# Patient Record
Sex: MALE | Race: BLACK OR AFRICAN AMERICAN | URBAN - METROPOLITAN AREA
[De-identification: names, ages, dates, MRNs, and addresses within clinical notes are randomized per-mention and may not be internally consistent; named-entity substitution may affect disease eponyms.]

---

## 2022-05-12 ENCOUNTER — INPATIENT (INPATIENT)
Facility: HOSPITAL | Age: 42
LOS: 6 days | Discharge: HOME | End: 2022-05-19
Attending: PSYCHIATRY & NEUROLOGY | Admitting: PSYCHIATRY & NEUROLOGY
Payer: MEDICAID

## 2022-05-12 VITALS
SYSTOLIC BLOOD PRESSURE: 136 MMHG | OXYGEN SATURATION: 98 % | WEIGHT: 169.98 LBS | RESPIRATION RATE: 20 BRPM | TEMPERATURE: 98 F | DIASTOLIC BLOOD PRESSURE: 83 MMHG | HEART RATE: 83 BPM

## 2022-05-12 DIAGNOSIS — Z90.49 ACQUIRED ABSENCE OF OTHER SPECIFIED PARTS OF DIGESTIVE TRACT: Chronic | ICD-10-CM

## 2022-05-12 DIAGNOSIS — F20.9 SCHIZOPHRENIA, UNSPECIFIED: ICD-10-CM

## 2022-05-12 LAB
A1C WITH ESTIMATED AVERAGE GLUCOSE RESULT: 5.3 % — SIGNIFICANT CHANGE UP (ref 4–5.6)
ALBUMIN SERPL ELPH-MCNC: 4.5 G/DL — SIGNIFICANT CHANGE UP (ref 3.5–5.2)
ALP SERPL-CCNC: 69 U/L — SIGNIFICANT CHANGE UP (ref 30–115)
ALT FLD-CCNC: 19 U/L — SIGNIFICANT CHANGE UP (ref 0–41)
ANION GAP SERPL CALC-SCNC: 13 MMOL/L — SIGNIFICANT CHANGE UP (ref 7–14)
APAP SERPL-MCNC: <5 UG/ML — LOW (ref 10–30)
AST SERPL-CCNC: 14 U/L — SIGNIFICANT CHANGE UP (ref 0–41)
BASOPHILS # BLD AUTO: 0.04 K/UL — SIGNIFICANT CHANGE UP (ref 0–0.2)
BASOPHILS NFR BLD AUTO: 0.7 % — SIGNIFICANT CHANGE UP (ref 0–1)
BILIRUB SERPL-MCNC: 0.9 MG/DL — SIGNIFICANT CHANGE UP (ref 0.2–1.2)
BUN SERPL-MCNC: 10 MG/DL — SIGNIFICANT CHANGE UP (ref 10–20)
CALCIUM SERPL-MCNC: 9.9 MG/DL — SIGNIFICANT CHANGE UP (ref 8.5–10.1)
CHLORIDE SERPL-SCNC: 103 MMOL/L — SIGNIFICANT CHANGE UP (ref 98–110)
CO2 SERPL-SCNC: 24 MMOL/L — SIGNIFICANT CHANGE UP (ref 17–32)
CREAT SERPL-MCNC: 1 MG/DL — SIGNIFICANT CHANGE UP (ref 0.7–1.5)
EGFR: 98 ML/MIN/1.73M2 — SIGNIFICANT CHANGE UP
EOSINOPHIL # BLD AUTO: 0.46 K/UL — SIGNIFICANT CHANGE UP (ref 0–0.7)
EOSINOPHIL NFR BLD AUTO: 7.5 % — SIGNIFICANT CHANGE UP (ref 0–8)
ESTIMATED AVERAGE GLUCOSE: 105 MG/DL — SIGNIFICANT CHANGE UP (ref 68–114)
ETHANOL SERPL-MCNC: <10 MG/DL — SIGNIFICANT CHANGE UP
GLUCOSE SERPL-MCNC: 103 MG/DL — HIGH (ref 70–99)
HCT VFR BLD CALC: 47.8 % — SIGNIFICANT CHANGE UP (ref 42–52)
HGB BLD-MCNC: 15.7 G/DL — SIGNIFICANT CHANGE UP (ref 14–18)
IMM GRANULOCYTES NFR BLD AUTO: 1.1 % — HIGH (ref 0.1–0.3)
LYMPHOCYTES # BLD AUTO: 0.7 K/UL — LOW (ref 1.2–3.4)
LYMPHOCYTES # BLD AUTO: 11.4 % — LOW (ref 20.5–51.1)
MCHC RBC-ENTMCNC: 26.5 PG — LOW (ref 27–31)
MCHC RBC-ENTMCNC: 32.8 G/DL — SIGNIFICANT CHANGE UP (ref 32–37)
MCV RBC AUTO: 80.6 FL — SIGNIFICANT CHANGE UP (ref 80–94)
MONOCYTES # BLD AUTO: 0.61 K/UL — HIGH (ref 0.1–0.6)
MONOCYTES NFR BLD AUTO: 10 % — HIGH (ref 1.7–9.3)
NEUTROPHILS # BLD AUTO: 4.25 K/UL — SIGNIFICANT CHANGE UP (ref 1.4–6.5)
NEUTROPHILS NFR BLD AUTO: 69.3 % — SIGNIFICANT CHANGE UP (ref 42.2–75.2)
NRBC # BLD: 0 /100 WBCS — SIGNIFICANT CHANGE UP (ref 0–0)
PLATELET # BLD AUTO: 169 K/UL — SIGNIFICANT CHANGE UP (ref 130–400)
POTASSIUM SERPL-MCNC: 3.9 MMOL/L — SIGNIFICANT CHANGE UP (ref 3.5–5)
POTASSIUM SERPL-SCNC: 3.9 MMOL/L — SIGNIFICANT CHANGE UP (ref 3.5–5)
PROT SERPL-MCNC: 6.3 G/DL — SIGNIFICANT CHANGE UP (ref 6–8)
RBC # BLD: 5.93 M/UL — SIGNIFICANT CHANGE UP (ref 4.7–6.1)
RBC # FLD: 13.4 % — SIGNIFICANT CHANGE UP (ref 11.5–14.5)
SALICYLATES SERPL-MCNC: <0.3 MG/DL — LOW (ref 4–30)
SARS-COV-2 RNA SPEC QL NAA+PROBE: SIGNIFICANT CHANGE UP
SODIUM SERPL-SCNC: 140 MMOL/L — SIGNIFICANT CHANGE UP (ref 135–146)
TSH SERPL-MCNC: 2.46 UIU/ML — SIGNIFICANT CHANGE UP (ref 0.27–4.2)
WBC # BLD: 6.13 K/UL — SIGNIFICANT CHANGE UP (ref 4.8–10.8)
WBC # FLD AUTO: 6.13 K/UL — SIGNIFICANT CHANGE UP (ref 4.8–10.8)

## 2022-05-12 PROCEDURE — 99285 EMERGENCY DEPT VISIT HI MDM: CPT

## 2022-05-12 PROCEDURE — 90792 PSYCH DIAG EVAL W/MED SRVCS: CPT | Mod: 95

## 2022-05-12 PROCEDURE — 93010 ELECTROCARDIOGRAM REPORT: CPT

## 2022-05-12 RX ORDER — NICOTINE POLACRILEX 2 MG
2 GUM BUCCAL
Refills: 0 | Status: DISCONTINUED | OUTPATIENT
Start: 2022-05-12 | End: 2022-05-19

## 2022-05-12 RX ORDER — PERMETHRIN CREAM 5% W/W 50 MG/G
1 CREAM TOPICAL ONCE
Refills: 0 | Status: COMPLETED | OUTPATIENT
Start: 2022-05-12 | End: 2022-05-12

## 2022-05-12 RX ORDER — LORATADINE 10 MG/1
10 TABLET ORAL DAILY
Refills: 0 | Status: DISCONTINUED | OUTPATIENT
Start: 2022-05-12 | End: 2022-05-19

## 2022-05-12 RX ORDER — HYDROXYZINE HCL 10 MG
50 TABLET ORAL EVERY 6 HOURS
Refills: 0 | Status: DISCONTINUED | OUTPATIENT
Start: 2022-05-12 | End: 2022-05-13

## 2022-05-12 RX ORDER — HALOPERIDOL DECANOATE 100 MG/ML
5 INJECTION INTRAMUSCULAR EVERY 6 HOURS
Refills: 0 | Status: DISCONTINUED | OUTPATIENT
Start: 2022-05-12 | End: 2022-05-19

## 2022-05-12 RX ORDER — BENZTROPINE MESYLATE 1 MG
1 TABLET ORAL EVERY 6 HOURS
Refills: 0 | Status: DISCONTINUED | OUTPATIENT
Start: 2022-05-12 | End: 2022-05-13

## 2022-05-12 RX ADMIN — PERMETHRIN CREAM 5% W/W 1 APPLICATION(S): 50 CREAM TOPICAL at 17:12

## 2022-05-12 NOTE — H&P ADULT - NSHPPHYSICALEXAM_GEN_ALL_CORE
Vital Signs Last 24 Hrs  T(C): 36.4 (12 May 2022 15:50), Max: 36.8 (12 May 2022 07:39)  T(F): 97.5 (12 May 2022 15:50), Max: 98.2 (12 May 2022 07:39)  HR: 64 (12 May 2022 15:50) (64 - 83)  BP: 122/72 (12 May 2022 15:50) (108/68 - 136/83)  BP(mean): --  RR: 18 (12 May 2022 15:50) (18 - 20)  SpO2: 97% (12 May 2022 07:39) (97% - 98%)    PHYSICAL EXAM:      Constitutional: A&Ox4  Respiratory: cta b/l  Cardiovascular: s1 s2 rrr  Gastrointestinal: soft nt  nd + bs no rebound or guarding  Genitourinary: no cva tenderness  Extremities: normal rom, no edema, calf tenderness  Neurological:no focal deficits  Skin: +excoriations

## 2022-05-12 NOTE — ED BEHAVIORAL HEALTH ASSESSMENT NOTE - DETAILS
to kill self d/w RN, will give handoff to MD pt unable to provide friend's phone number on [place], pt unable to recall name of hospital, not showing on PSYCKES yet pt reports hx of arrest for fighting s/p interrupted SA by hanging, reports hx of SA by hanging ~6 months ago deferred unknown

## 2022-05-12 NOTE — ED PROVIDER NOTE - PROGRESS NOTE DETAILS
spoke withtelepsych, pending callback spoke with psych, patient to be admitted to [facility] pending paperwork. AH - Received sign out from MD Núñez.  Pt reassessed, stable, no acute concerns.  Psych recommending [facility]

## 2022-05-12 NOTE — H&P ADULT - NSHPLABSRESULTS_GEN_ALL_CORE
15.7   6.13  )-----------( 169      ( 12 May 2022 01:55 )             47.8       05-12    140  |  103  |  10  ----------------------------<  103<H>  3.9   |  24  |  1.0    Ca    9.9      12 May 2022 01:55    TPro  6.3  /  Alb  4.5  /  TBili  0.9  /  DBili  x   /  AST  14  /  ALT  19  /  AlkPhos  69  05-12

## 2022-05-12 NOTE — H&P ADULT - ASSESSMENT
Patient is a 40yo m with schizophrenia admitted to [facility] for suicidal ideations.   -plan as per psych team  -f/u labs, tsh, uds, lipid panel    #suspected skin infestation due to findings of lice and c/o pruitis  -permethrin cream rinse  -isolation precautions  -claritin for pruritis can also add pruritis as indication for the vistaril that is already ordered    #tobacco use  -pt requests nicotine gum will order

## 2022-05-12 NOTE — ED BEHAVIORAL HEALTH ASSESSMENT NOTE - RISK ASSESSMENT
risk factors: male, single, limited social supports, s/p SA, hx SA, ongoing CAH and SI, hx substance use, psych dx, prior admissions    protective factors: help seeking, denies access to guns High Acute Suicide Risk

## 2022-05-12 NOTE — ED PROVIDER NOTE - ATTENDING CONTRIBUTION TO CARE
41-year-old male with schizophrenia on Haldol and Cogentin states he is compliant he is complaining that he is hearing voices telling to kill himself and that he wants to die he says that he tried to hang himself with his belt today but someone caught him.  Attempt was with a rope around his neck.      Patient in no acute distress blunted affect but cooperative  , PERRL EOMI no focal deficits   CTA RRR abdomen soft nontender

## 2022-05-12 NOTE — ED BEHAVIORAL HEALTH ASSESSMENT NOTE - SUMMARY
The patient is a 41-year-old male; domiciled alone; unemployed, on public assistance; PPHx of schizophrenia, prior admissions and CPEP visits, hx SA; denies PMHx; hx marijuana use; BIBS for CAH.  Pt seeking and appropriate for voluntary admission given his risk factors outlined below.

## 2022-05-12 NOTE — PATIENT PROFILE BEHAVIORAL HEALTH - NSDYSPHAGSECTONE_PSY_ALL_CORE
Subjective:       Patient ID: Tania Mcgrath is a 41 y.o. female.    Chief Complaint: Follow-up (after sono )    Ms Mcgrath presents today for a repeat US to determine viability  She denies any VB or cramping  HCG 6/11 2,318, HCG 6/30 82, 660  US on 30th with 6 week gest sac and no noted FHT's with fetal pole  US today with 6 week GS and no FHT's noted with fetal pole again  Desires D&C with Dr Montemayor tomorrow  Message sent to Irina Neumann    Review of Systems   Psychiatric/Behavioral: Positive for sleep disturbance.        Sad that this is another missed AB         Objective:      Physical Exam  Vitals signs reviewed.   Constitutional:       Appearance: Normal appearance.   Pulmonary:      Effort: Pulmonary effort is normal.   Genitourinary:     Comments: Pelvic deferred  Skin:     General: Skin is warm and dry.   Neurological:      Mental Status: She is alert.   Psychiatric:      Comments: Sad, emotional support given         Assessment:       1. Missed ab        Plan:       Will get scheduled for D&C tomorrow  Advised NPO after MN and to arrive at hospital on [address] @ 0700   N/A 06-Feb-2022 04:53

## 2022-05-12 NOTE — ED ADULT NURSE NOTE - ORIENTED TO PERSON
Anesthesia Evaluation     Patient summary reviewed and Nursing notes reviewed   NPO Solid Status: > 8 hours  NPO Liquid Status: > 2 hours           Airway   Mallampati: II  Dental      Pulmonary - normal exam   (+) COPD, asthma,  Cardiovascular - normal exam    (+) hypertension,       Neuro/Psych  (+) numbness,     GI/Hepatic/Renal/Endo    (+)  GERD,      Musculoskeletal     Abdominal    Substance History      OB/GYN          Other   arthritis,                      Anesthesia Plan    ASA 3     MAC     intravenous induction     Anesthetic plan, all risks, benefits, and alternatives have been provided, discussed and informed consent has been obtained with: patient.      
Yes

## 2022-05-12 NOTE — PROVIDER CONTACT NOTE (OTHER) - SITUATION
notified yasmin adan regarding new admission. x [phone]
notified yasmin adan regarding new admission. [phone]

## 2022-05-12 NOTE — ED PROVIDER NOTE - OBJECTIVE STATEMENT
41M hx of schizophrenia on haldol and congentin, compliant with medication, presents with suicidal thoughts and plans. patient notes he was planning on hanging himself with his belt but was caught by a friend, has prior attempts with rope, endorses auditory and visual hallucinations, denies chest pain/abd pain/back pain/shortness of breath/neck pain.

## 2022-05-12 NOTE — ED ADULT TRIAGE NOTE - CHIEF COMPLAINT QUOTE
Pt states he hearing voices to kill himself Pt states he hearing voices to kill himself/ 1:1 initiated in triage

## 2022-05-12 NOTE — H&P ADULT - HISTORY OF PRESENT ILLNESS
Patient is a 41M with a hx of schizophrenia on haldol and congentin, compliant with medication, whom presented to ED with c/o suicidal thoughts and plans. Patient reported he was planning on hanging himself with his belt but was caught by a friend. Patient reports prior suicide attempts with a rope. Patient c/o auditory and visual hallucinations. Patient denies any HI or etoh or drug use. Patient was foun to have bug in his clothing appearing as lice. patient is homeless. Patient c/o pruritis of skin.

## 2022-05-12 NOTE — PATIENT PROFILE BEHAVIORAL HEALTH - FALL HARM RISK - UNIVERSAL INTERVENTIONS
Bed in lowest position, wheels locked, appropriate side rails in place/Call bell, personal items and telephone in reach/Instruct patient to call for assistance before getting out of bed or chair/Non-slip footwear when patient is out of bed/[place] to call system/Physically safe environment - no spills, clutter or unnecessary equipment/Purposeful Proactive Rounding/Room/bathroom lighting operational, light cord in reach

## 2022-05-12 NOTE — ED ADULT NURSE NOTE - OBJECTIVE STATEMENT
patient complaints he is hearing voices and is suicidal.  Patient reports to have a plan to hang himself.  Patient placed on one to one observation.

## 2022-05-12 NOTE — ED BEHAVIORAL HEALTH ASSESSMENT NOTE - HPI (INCLUDE ILLNESS QUALITY, SEVERITY, DURATION, TIMING, CONTEXT, MODIFYING FACTORS, ASSOCIATED SIGNS AND SYMPTOMS)
The patient is a 41-year-old male; domiciled alone; unemployed, on public assistance; PPHx of schizophrenia, prior admissions and CPEP visits, hx SA; denies PMHx; hx marijuana use; BIBS for CAH.  Pt states he has CAH to kill himself and tried to hang himself from a pole in his basement using a belt today but was stopped by a friend.  Pt reports depressed mood, worry, poor sleep, decreased appetite, +SI, +paranoia ("the voices make me paranoid").  Pt states he goes to a day program usually but hasn't been able to attend for the past 2 days due to symptoms.  He is unable to provide any collateral contact numbers at this time.    Covid Screen - Patient  testing? negative in ED today, denies prior tests in past 3 months  vaccine? received 1 dose of J&J  exposures? denies

## 2022-05-12 NOTE — ED BEHAVIORAL HEALTH ASSESSMENT NOTE - OTHER PAST PSYCHIATRIC HISTORY (INCLUDE DETAILS REGARDING ONSET, COURSE OF ILLNESS, INPATIENT/OUTPATIENT TREATMENT)
per PSYCKES:  Schizophrenia | Schizoaffective Disorder | Unspecified/Other Depressive Disorder | Unspecified/Other Anxiety Disorder | Major Depressive Disorder | Alcohol related disorders | Adjustment Disorder | Unspecified/Other Psychotic Disorders | Brief Psychotic Disorder (ICD10 Only) | Cannabis related disorders | Other psychoactive substance related disorders | Paranoid Personality Disorder | Selective Mutism | Cocaine related disorders | Delusional Disorder | Generalized Anxiety Disorder | Other Mental Disorders | Substance-Induced Psychotic Disorder | Unspecified/Other Personality Disorder    past admissions at [facility], [facility], [facility], [facility], [facility]

## 2022-05-12 NOTE — H&P ADULT - NSHPREVIEWOFSYSTEMS_GEN_ALL_CORE
General:	no fever, weight loss,  chills  Skin: + rash,   Ophthalmologic: no visual changes  ENMT:	no sore throat  Respiratory and Thorax: no cough, wheeze,  sob  Cardiovascular:	no chest pain, palpitations, dizziness  Gastrointestinal:	no nausea, vomiting, diarrhea, abd pain  Genitourinary:	no dysuria, hematuria  Musculoskeletal:	no joint pains  Neurological:	 no speech disturbance, focal weakness, numbness  Psychiatric:	as above  Hematology/Lymphatics:	no anemia  Endocrine:	no polyuria, polydipsia

## 2022-05-12 NOTE — ED PROVIDER NOTE - PHYSICAL EXAMINATION
Vital Signs: I have reviewed the initial vital signs.  Constitutional: appears stated age, no acute distress.  HEENT: Airway patent, moist MM, no erythema/swelling/deformity of oral structures. EOMI, PERRLA. no Stridor.   CV: regular rate, regular rhythm, well-perfused extremities, 2+ b/l DP and radial pulses equal.  Lungs: BCTA, no increased WOB.  ABD: soft, NTND, no guarding or rebound, no pulsatile mass, no hernias, no flank pain.   MSK: Neck supple, nontender, nl ROM, no stepoff. Chest nontender. Back nontender in TLS spine or to b/l bony structures. Ext nontender, nl rom, no deformity.   INTEG: Skin warm, dry, no rash.  NEURO: A&Ox3, moving all extremities, normal speech  PSYCH: Calm, cooperative, normal affect and interaction.

## 2022-05-13 DIAGNOSIS — F12.10 CANNABIS ABUSE, UNCOMPLICATED: ICD-10-CM

## 2022-05-13 LAB
AMPHET UR-MCNC: NEGATIVE — SIGNIFICANT CHANGE UP
APPEARANCE UR: CLEAR — SIGNIFICANT CHANGE UP
BARBITURATES UR SCN-MCNC: NEGATIVE — SIGNIFICANT CHANGE UP
BENZODIAZ UR-MCNC: NEGATIVE — SIGNIFICANT CHANGE UP
BILIRUB UR-MCNC: NEGATIVE — SIGNIFICANT CHANGE UP
COCAINE METAB.OTHER UR-MCNC: NEGATIVE — SIGNIFICANT CHANGE UP
COLOR SPEC: YELLOW — SIGNIFICANT CHANGE UP
DIFF PNL FLD: NEGATIVE — SIGNIFICANT CHANGE UP
DRUG SCREEN 1, URINE RESULT: SIGNIFICANT CHANGE UP
FENTANYL UR QL: NEGATIVE — SIGNIFICANT CHANGE UP
GLUCOSE UR QL: NEGATIVE MG/DL — SIGNIFICANT CHANGE UP
KETONES UR-MCNC: NEGATIVE — SIGNIFICANT CHANGE UP
LEUKOCYTE ESTERASE UR-ACNC: NEGATIVE — SIGNIFICANT CHANGE UP
METHADONE UR-MCNC: NEGATIVE — SIGNIFICANT CHANGE UP
NITRITE UR-MCNC: NEGATIVE — SIGNIFICANT CHANGE UP
OPIATES UR-MCNC: NEGATIVE — SIGNIFICANT CHANGE UP
OXYCODONE UR-MCNC: NEGATIVE — SIGNIFICANT CHANGE UP
PCP UR-MCNC: NEGATIVE — SIGNIFICANT CHANGE UP
PH UR: 7 — SIGNIFICANT CHANGE UP (ref 5–8)
PROT UR-MCNC: NEGATIVE MG/DL — SIGNIFICANT CHANGE UP
SP GR SPEC: 1.02 — SIGNIFICANT CHANGE UP (ref 1.01–1.03)
THC UR QL: NEGATIVE — SIGNIFICANT CHANGE UP
UROBILINOGEN FLD QL: 0.2 MG/DL — SIGNIFICANT CHANGE UP

## 2022-05-13 PROCEDURE — 99232 SBSQ HOSP IP/OBS MODERATE 35: CPT

## 2022-05-13 RX ORDER — HYDROXYZINE HCL 10 MG
50 TABLET ORAL EVERY 6 HOURS
Refills: 0 | Status: DISCONTINUED | OUTPATIENT
Start: 2022-05-13 | End: 2022-05-19

## 2022-05-13 RX ORDER — BENZTROPINE MESYLATE 1 MG
0.5 TABLET ORAL
Refills: 0 | Status: DISCONTINUED | OUTPATIENT
Start: 2022-05-13 | End: 2022-05-19

## 2022-05-13 RX ORDER — HALOPERIDOL DECANOATE 100 MG/ML
5 INJECTION INTRAMUSCULAR
Refills: 0 | Status: DISCONTINUED | OUTPATIENT
Start: 2022-05-13 | End: 2022-05-16

## 2022-05-13 RX ADMIN — Medication 0.5 MILLIGRAM(S): at 20:14

## 2022-05-13 RX ADMIN — HALOPERIDOL DECANOATE 5 MILLIGRAM(S): 100 INJECTION INTRAMUSCULAR at 20:14

## 2022-05-13 RX ADMIN — LORATADINE 10 MILLIGRAM(S): 10 TABLET ORAL at 09:28

## 2022-05-13 NOTE — PROGRESS NOTE BEHAVIORAL HEALTH - NSBHFUPINTERVALHXFT_PSY_A_CORE
Pt seen and evaluated, chart reviewed.     Pt refuses to provide personal collateral information at this time.     Spoke with pt's OP pharmacy, [facility] - haldol 10 mg bid, cogentin 0.5 mg bid (last p/u 4/18/22, Dr. Anderson, [phone]); reports h/o abilify 15 daily, folic acid 1 mg day Pt seen and evaluated, chart reviewed. As per nursing report, no acute events overnight. On evaluation, pt presents oddly-related with bizarre affect, calm and cooperative with fair eye contact. He endorses CAH telling him to "kill myself" "to hang myself", reports CAH began last week. He states he was 29 y/o when he first had AH, reports multiple prior hospitalizations and medication trials. He reports he was last hospitalized 2 weeks ago, unable to recall where, and restarted on haldol and cogentin. He states medications were helpful and requests to restart. He denies intent to act on CAH. He appears future-oriented and goal-directed, requesting SW for information regarding housing assistance and SSD. He denies VH. He endorses paranoia towards AH, states he worries the voice is after him. He reports d/t AH, he has been with low mood a/w feelings of hopelessness and SI. He denies changes to his appetite, sleep, energy and motivation. He  denies sx suggestive of elijah. Denies HI/I/P.    Pt refuses to provide personal collateral information at this time.     Spoke with pt's OP pharmacy, [facility] - haldol 10 mg bid, cogentin 0.5 mg bid (last p/u 4/18/22, Dr. Anderson, [phone]); reports h/o abilify 15 daily, folic acid 1 mg day Pt seen and evaluated, chart reviewed. As per nursing report, no acute events overnight. On evaluation, pt presents oddly-related, calm and cooperative with fair eye contact. He endorses CAH telling him to "kill myself" "to hang myself", reports CAH began last week. He states he was 27 y/o when he first had AH, reports multiple prior hospitalizations and medication trials. He reports he was last hospitalized 2 weeks ago, unable to recall where, and restarted on haldol and cogentin. He states medications were helpful and requests to restart. He initially reports he was adherent to medications, then states he may have missed doses. He denies intent to act on CAH. He appears future-oriented and goal-directed, requesting SW for information regarding housing assistance and SSD. He denies VH. He endorses paranoia towards AH, states he worries the voice is after him. He reports d/t AH, he has been with low mood a/w feelings of hopelessness and SI. He denies changes to his appetite, sleep, energy and motivation. He  denies sx suggestive of elijah. Denies HI/I/P.    Pt refuses to provide personal collateral information at this time.     Spoke with pt's OP pharmacy, [facility] - haldol 10 mg bid, cogentin 0.5 mg bid (last p/u 4/18/22, Dr. Anderson, [phone]); reports h/o abilify 15 daily, folic acid 1 mg day Pt seen and evaluated, chart reviewed. As per nursing report, no acute events overnight. On evaluation, pt presents oddly-related, calm and cooperative with fair eye contact. He endorses CAH telling him to "kill myself" "to hang myself", reports CAH began last week. He states he was 29 y/o when he first had AH, reports multiple prior hospitalizations and medication trials. He reports he was last hospitalized 2 weeks ago for similar sx, he is unable to recall which hospital, and restarted on haldol and cogentin. He states medications were helpful and requests to restart. He initially reports he was adherent to medications, then states he may have missed doses. He denies intent to act on CAH. He appears future-oriented and goal-directed, requesting SW for information regarding housing assistance and SSD. He denies VH. He endorses paranoia towards AH, states he worries the voice is after him. He reports d/t AH, he has been with low mood a/w feelings of hopelessness and SI. He denies changes to his appetite, sleep, energy and motivation. He  denies sx suggestive of elijah. Denies HI/I/P.    Pt refuses to provide personal collateral information at this time.     Spoke with pt's OP pharmacy, [facility] - haldol 10 mg bid, cogentin 0.5 mg bid (last p/u 4/18/22, Dr. Anderson, [phone]); reports h/o abilify 15 daily, folic acid 1 mg day

## 2022-05-13 NOTE — PROGRESS NOTE BEHAVIORAL HEALTH - NSBHADDHXPSYCHFT_PSY_A_CORE
As per chart review, per PSYCKES: Schizophrenia | Schizoaffective Disorder | Unspecified/Other Depressive Disorder | Unspecified/Other Anxiety Disorder | Major Depressive Disorder | Alcohol related disorders | Adjustment Disorder | Unspecified/Other Psychotic Disorders | Brief Psychotic Disorder (ICD10 Only) | Cannabis related disorders | Other psychoactive substance related disorders | Paranoid Personality Disorder | Selective Mutism | Cocaine related disorders | Delusional Disorder | Generalized Anxiety Disorder | Other Mental Disorders | Substance-Induced Psychotic Disorder | Unspecified/Other Personality Disorder  Past admissions at [facility], [facility], [facility], [facility], [facility]

## 2022-05-13 NOTE — PROGRESS NOTE BEHAVIORAL HEALTH - NSBHFUPADDHPIFT_PSY_A_CORE
The patient is a 41-year-old male; domiciled alone; unemployed, on public assistance; PPHx of schizophrenia, prior admissions and CPEP visits, hx SA; denies PMHx; hx marijuana use; BIBS for CAH.  Pt states he has CAH to kill himself and tried to hang himself from a pole in his basement using a belt today but was stopped by a friend.  Pt reports depressed mood, worry, poor sleep, decreased appetite, +SI, +paranoia ("the voices make me paranoid").  Pt states he goes to a day program usually but hasn't been able to attend for the past 2 days due to symptoms.  He is unable to provide any collateral contact numbers at this time.

## 2022-05-13 NOTE — PROGRESS NOTE BEHAVIORAL HEALTH - NSBHADDHXSUBSTFT_PSY_A_CORE
Pt reports occasional cannabis use, denies daily use, unable to quantify. Reports cigarettes use, 3-4 cigarettes per day. Reports h/o alcohol use, denies recent use. Denies use of other illicit substances.

## 2022-05-13 NOTE — PROGRESS NOTE BEHAVIORAL HEALTH - NSBHADDHXPSYSOCFT_PSY_A_CORE
Pt reports one child, age 17 y/o living with her mother. Reports highest level of education 11th grade. Reports legal hx including arrest for selling drugs.   As per chart review, legal hx of fighting

## 2022-05-13 NOTE — PROGRESS NOTE BEHAVIORAL HEALTH - SUMMARY
42 y/o male; domiciled alone; unemployed, on public assistance; PPHx of schizophrenia, prior admissions and CPEP visits, hx SA; denies PMHx; hx marijuana use; BIBS for CAH. Pt states he has CAH to kill himself and tried to hang himself from a pole in his basement using a belt today but was stopped by a friend. On evaluation, pt presents bizarre and oddly-related, calm and cooperative, states he has been depressed and suicidal d/t CAH. He reports h/o good response to haldol and cogentin, requesting to restart. Pt also presents future-oriented and goal-directed, asking SW on housing and financial assistance programs. His presentation is unclear at this time, however decompensation of schizophrenia in setting of medication nonadherence is high on differential. R/o SIMD/psychosis, pending UDS.     #Schizophrenia  -start haldol 5 mg bid  -start cogentin 5 mg bid    #Cannabis abuse  -no treatment protocol indicated at this time     #Agitation  -for agitation not amenable to verbal redirection, 40 y/o male; domiciled alone; unemployed, on public assistance; PPHx of schizophrenia, prior admissions and CPEP visits, hx SA; denies PMHx; hx marijuana use; BIBS for CAH. Pt states he has CAH to kill himself and tried to hang himself from a pole in his basement using a belt today but was stopped by a friend. On evaluation, pt presents oddly-related, calm and cooperative, states he has been depressed and suicidal d/t CAH. He reports h/o good response to haldol and cogentin, requesting to restart. Pt also presents future-oriented and goal-directed, asking SW on housing and financial assistance programs. His presentation is unclear at this time, however decompensation of schizophrenia in setting of medication nonadherence is high on differential. R/o SIMD/psychosis, pending UDS.     #Schizophrenia  -start haldol 5 mg bid  -start cogentin 5 mg bid    #Cannabis abuse  -no treatment protocol indicated at this time     #Suspected skin infestation due to findings of lice and c/o pruitis  -medicine consult  -s/p permethrin cream rinse  -isolation precautions --> d/c    #Agitation  -for agitation not amenable to verbal redirection, may give haldol 5 mg q6h prn and/or ativan 2 mg q6h prn with escalation to IM if pt is a danger to self or/and others with repeat EKG to ensure QTc <500 ms

## 2022-05-14 LAB
CHOLEST SERPL-MCNC: 153 MG/DL — SIGNIFICANT CHANGE UP
FOLATE SERPL-MCNC: 16.7 NG/ML — SIGNIFICANT CHANGE UP
HDLC SERPL-MCNC: 46 MG/DL — SIGNIFICANT CHANGE UP
LIPID PNL WITH DIRECT LDL SERPL: 75 MG/DL — SIGNIFICANT CHANGE UP
NON HDL CHOLESTEROL: 107 MG/DL — SIGNIFICANT CHANGE UP
TRIGL SERPL-MCNC: 162 MG/DL — HIGH
VIT B12 SERPL-MCNC: 377 PG/ML — SIGNIFICANT CHANGE UP (ref 232–1245)

## 2022-05-14 PROCEDURE — 99233 SBSQ HOSP IP/OBS HIGH 50: CPT

## 2022-05-14 RX ADMIN — HALOPERIDOL DECANOATE 5 MILLIGRAM(S): 100 INJECTION INTRAMUSCULAR at 20:39

## 2022-05-14 RX ADMIN — HALOPERIDOL DECANOATE 5 MILLIGRAM(S): 100 INJECTION INTRAMUSCULAR at 08:22

## 2022-05-14 RX ADMIN — Medication 0.5 MILLIGRAM(S): at 08:22

## 2022-05-14 RX ADMIN — Medication 0.5 MILLIGRAM(S): at 20:36

## 2022-05-14 RX ADMIN — LORATADINE 10 MILLIGRAM(S): 10 TABLET ORAL at 08:22

## 2022-05-14 NOTE — PROGRESS NOTE BEHAVIORAL HEALTH - SUMMARY
Patient is a 42 yo male; domiciled alone; unemployed, on public assistance; PPHx of schizophrenia, prior admissions, hx SA; hx marijuana use; BIBS for CAH.     #Schizophrenia  -Haldol 5 mg twice daily  -Cogentin 5 mg twice daily    #Suspected skin infestation due to findings of lice and c/o pruitis  -medicine consult  -s/p permethrin cream rinse  -isolation precautions --> d/c    #Agitation  -for agitation not amenable to verbal redirection, may give haldol 5 mg q6h prn and/or ativan 2 mg q6h prn with escalation to IM if pt is a danger to self or/and others with repeat EKG to ensure QTc <500 ms

## 2022-05-14 NOTE — PROGRESS NOTE BEHAVIORAL HEALTH - NSBHFUPINTERVALHXFT_PSY_A_CORE
Patient seen and evaluated, chart reviewed.  He did not require prn medications overnight.  On evaluation, patient is smiling, states he is experiencing voices to hurt self but he adamantly denies intention of acting on these thoughts. He sleeps well, good appetite, he is adherent to medications, requesting SW for information regarding housing assistance and SSD.  Presently he denies thoughts of harm to self or others.

## 2022-05-15 LAB — T PALLIDUM AB TITR SER: NEGATIVE — SIGNIFICANT CHANGE UP

## 2022-05-15 RX ADMIN — Medication 0.5 MILLIGRAM(S): at 08:13

## 2022-05-15 RX ADMIN — Medication 0.5 MILLIGRAM(S): at 20:34

## 2022-05-15 RX ADMIN — HALOPERIDOL DECANOATE 5 MILLIGRAM(S): 100 INJECTION INTRAMUSCULAR at 08:13

## 2022-05-15 RX ADMIN — LORATADINE 10 MILLIGRAM(S): 10 TABLET ORAL at 08:12

## 2022-05-15 RX ADMIN — HALOPERIDOL DECANOATE 5 MILLIGRAM(S): 100 INJECTION INTRAMUSCULAR at 20:31

## 2022-05-15 NOTE — PROGRESS NOTE BEHAVIORAL HEALTH - NSBHFUPINTERVALHXFT_PSY_A_CORE
Patient seen and evaluated, chart reviewed.  He did not require prn medications overnight. He sleeps well, good appetite, he is adherent to medications, requesting  for information regarding housing assistance and SSD.  Presently he denies thoughts of harm to self or others.denies a/v hallucinations. denies any command  auditory hallucination

## 2022-05-16 PROCEDURE — 99231 SBSQ HOSP IP/OBS SF/LOW 25: CPT

## 2022-05-16 RX ORDER — HALOPERIDOL DECANOATE 100 MG/ML
10 INJECTION INTRAMUSCULAR AT BEDTIME
Refills: 0 | Status: DISCONTINUED | OUTPATIENT
Start: 2022-05-16 | End: 2022-05-19

## 2022-05-16 RX ORDER — HALOPERIDOL DECANOATE 100 MG/ML
5 INJECTION INTRAMUSCULAR DAILY
Refills: 0 | Status: DISCONTINUED | OUTPATIENT
Start: 2022-05-17 | End: 2022-05-19

## 2022-05-16 RX ADMIN — HALOPERIDOL DECANOATE 5 MILLIGRAM(S): 100 INJECTION INTRAMUSCULAR at 08:24

## 2022-05-16 RX ADMIN — Medication 0.5 MILLIGRAM(S): at 08:24

## 2022-05-16 RX ADMIN — LORATADINE 10 MILLIGRAM(S): 10 TABLET ORAL at 08:24

## 2022-05-16 RX ADMIN — HALOPERIDOL DECANOATE 10 MILLIGRAM(S): 100 INJECTION INTRAMUSCULAR at 20:17

## 2022-05-16 RX ADMIN — Medication 0.5 MILLIGRAM(S): at 20:17

## 2022-05-16 NOTE — PROGRESS NOTE BEHAVIORAL HEALTH - SUMMARY
40 y/o male; domiciled alone; unemployed, on public assistance; PPHx of schizophrenia, prior admissions and CPEP visits, hx SA; denies PMHx; hx marijuana use; BIBS for CAH. Pt states he has CAH to kill himself and tried to hang himself from a pole in his basement using a belt today but was stopped by a friend. On evaluation, pt presents oddly-related, calm and cooperative, states he has been depressed and suicidal d/t CAH. He reports h/o good response to haldol and cogentin, requesting to restart. Pt also presents future-oriented and goal-directed, asking SW on housing and financial assistance programs. His presentation is unclear at this time, however decompensation of schizophrenia in setting of medication nonadherence is high on differential. R/o SIMD/psychosis, pending UDS.     #Schizophrenia  -start haldol 5 mg bid --> titrate haldol 5 mg qAM/ 10 mg qHS (5/16)  -c/w cogentin 0.5 mg bid    #Cannabis abuse  -no treatment protocol indicated at this time     #Suspected skin infestation due to findings of lice and c/o pruitis  -medicine consult  -s/p permethrin cream rinse  -isolation precautions --> d/c    #Agitation  -for agitation not amenable to verbal redirection, may give haldol 5 mg q6h prn and/or ativan 2 mg q6h prn with escalation to IM if pt is a danger to self or/and others with repeat EKG to ensure QTc <500 ms

## 2022-05-16 NOTE — PROGRESS NOTE BEHAVIORAL HEALTH - NSBHFUPINTERVALHXFT_PSY_A_CORE
Pt seen and evaluated, chart reviewed. As per nursing report, no acute events, no PRNs. On evaluation, pt calm and cooperative with improved grooming. He reports he is doing better today, reports improvement of AH. He reports AH is softer and no longer telling him do harm himself. He reports AH "having random conversations". He reports improved mood with improved AH. Endorses good sleep and appetite, regular BMs. He denies VH, paranoia. He denies SI/HI, intent and plan. Pt adherent to medications, denies negative side effects. Pt isolative to room, in behavioral control.

## 2022-05-17 PROCEDURE — 99231 SBSQ HOSP IP/OBS SF/LOW 25: CPT

## 2022-05-17 RX ADMIN — Medication 0.5 MILLIGRAM(S): at 20:38

## 2022-05-17 RX ADMIN — Medication 0.5 MILLIGRAM(S): at 08:17

## 2022-05-17 RX ADMIN — HALOPERIDOL DECANOATE 10 MILLIGRAM(S): 100 INJECTION INTRAMUSCULAR at 20:38

## 2022-05-17 RX ADMIN — LORATADINE 10 MILLIGRAM(S): 10 TABLET ORAL at 08:16

## 2022-05-17 RX ADMIN — HALOPERIDOL DECANOATE 5 MILLIGRAM(S): 100 INJECTION INTRAMUSCULAR at 08:17

## 2022-05-17 NOTE — PROGRESS NOTE BEHAVIORAL HEALTH - NSBHFUPINTERVALHXFT_PSY_A_CORE
Pt seen and evaluated, chart reviewed. As per nursing report, no acute events, no PRNs. On evaluation, pt presents calm and cooperative, linear and well-groomed. He reports resolution of AH, states he last had AH yesterday. He reports mood "good". Endorses good sleep and appetite. He denies VH, paranoia. He denies SI/HI, intent and plan. Pt presents future-oriented and goal-directed, requests for information on housing assistance and states he will f/u after discharge. Pt adherent to medications, denies negative side effects. Pt more visible on unit, in behavioral control. Discharge planning started.

## 2022-05-17 NOTE — PROGRESS NOTE BEHAVIORAL HEALTH - SUMMARY
40 y/o male; domiciled alone; unemployed, on public assistance; PPHx of schizophrenia, prior admissions and CPEP visits, hx SA; denies PMHx; hx marijuana use; BIBS for CAH. Pt states he has CAH to kill himself and tried to hang himself from a pole in his basement using a belt today but was stopped by a friend. On evaluation, pt presents oddly-related, calm and cooperative, states he has been depressed and suicidal d/t CAH. He reports h/o good response to haldol and cogentin, requesting to restart. Pt also presents future-oriented and goal-directed, asking SW on housing and financial assistance programs. His presentation is unclear at this time, however decompensation of schizophrenia in setting of medication nonadherence is high on differential. R/o SIMD/psychosis, UDS negative. On evaluation, pt presents linear and with good ADLs, reports good mood and sleep with resolution of AH and SI/I/P. Discharge planning started.     #Schizophrenia  -start haldol 5 mg bid --> titrate haldol 5 mg qAM/ 10 mg qHS (5/16)  -c/w cogentin 0.5 mg bid    #Cannabis abuse  -no treatment protocol indicated at this time     #Suspected skin infestation due to findings of lice and c/o pruitis  -medicine consult  -s/p permethrin cream rinse  -isolation precautions --> d/c    #Agitation  -for agitation not amenable to verbal redirection, may give haldol 5 mg q6h prn and/or ativan 2 mg q6h prn with escalation to IM if pt is a danger to self or/and others with repeat EKG to ensure QTc <500 ms

## 2022-05-18 PROCEDURE — 99231 SBSQ HOSP IP/OBS SF/LOW 25: CPT

## 2022-05-18 RX ORDER — HALOPERIDOL DECANOATE 100 MG/ML
1 INJECTION INTRAMUSCULAR
Qty: 14 | Refills: 0
Start: 2022-05-18 | End: 2022-05-31

## 2022-05-18 RX ORDER — BENZTROPINE MESYLATE 1 MG
1 TABLET ORAL
Qty: 28 | Refills: 0
Start: 2022-05-18 | End: 2022-05-31

## 2022-05-18 RX ORDER — NICOTINE POLACRILEX 2 MG
1 GUM BUCCAL
Qty: 168 | Refills: 0
Start: 2022-05-18 | End: 2022-05-31

## 2022-05-18 RX ADMIN — Medication 0.5 MILLIGRAM(S): at 20:22

## 2022-05-18 RX ADMIN — LORATADINE 10 MILLIGRAM(S): 10 TABLET ORAL at 09:13

## 2022-05-18 RX ADMIN — HALOPERIDOL DECANOATE 5 MILLIGRAM(S): 100 INJECTION INTRAMUSCULAR at 09:13

## 2022-05-18 RX ADMIN — HALOPERIDOL DECANOATE 10 MILLIGRAM(S): 100 INJECTION INTRAMUSCULAR at 20:22

## 2022-05-18 RX ADMIN — Medication 0.5 MILLIGRAM(S): at 09:12

## 2022-05-18 NOTE — DISCHARGE NOTE BEHAVIORAL HEALTH - NSBHDCMEDSFT_PSY_A_CORE
Started on haldol, titrated haldol 5 mg qAM/ 10 mg qHS. Started on cogentin 0.5 mg bid. Pt tolerated medications well, denied negative side effects.  Psychoeducation provided on risks and benefits of haldol decanoate, and side effects profile. Pt refused start despite encouragement.

## 2022-05-18 NOTE — DISCHARGE NOTE BEHAVIORAL HEALTH - MEDICATION SUMMARY - MEDICATIONS TO TAKE
I will START or STAY ON the medications listed below when I get home from the hospital:    benztropine 0.5 mg oral tablet  -- 1 tab(s) by mouth 2 times a day x 14 days Continue to take as prescribed until told otherwise by your provider  -- Indication: For EPS    haloperidol 10 mg oral tablet  -- 1 tab(s) by mouth once a day (at bedtime) x 14 days  Continue to take as prescribed until told otherwise by your provider  -- Indication: For Schizophrenia    haloperidol 5 mg oral tablet  -- 1 tab(s) by mouth once a day x 14 days  Continue to take as prescribed until told otherwise by your provider  -- Indication: For Schizophrenia    nicotine 2 mg oral transmucosal gum  -- 1 gum chewed every 2 hours x 14 days, As Needed -smoking cessation  Continue to take as prescribed until told otherwise by your provider  -- Do not take this drug if you are pregnant.  It is very important that you take or use this exactly as directed.  Do not skip doses or discontinue unless directed by your doctor.    -- Indication: For Smoking cessation

## 2022-05-18 NOTE — DISCHARGE NOTE BEHAVIORAL HEALTH - NSBHDCADDFT_PSY_A_CORE
Of note, pt has been future-oriented and goal-directed since admission, requesting for SW and  regarding housing assistance

## 2022-05-18 NOTE — DISCHARGE NOTE BEHAVIORAL HEALTH - NSBHDCSUBSTHXFT_PSY_A_CORE
As noted above.  Pt reports occasional cannabis use, denies daily use, unable to quantify. Reports cigarettes use, 3-4 cigarettes per day. Reports h/o alcohol use, denies recent use. Denies use of other illicit substances.

## 2022-05-18 NOTE — DISCHARGE NOTE BEHAVIORAL HEALTH - HPI (INCLUDE ILLNESS QUALITY, SEVERITY, DURATION, TIMING, CONTEXT, MODIFYING FACTORS, ASSOCIATED SIGNS AND SYMPTOMS)
The patient is a 41-year-old male; domiciled alone; unemployed, on public assistance; PPHx of schizophrenia, prior admissions and CPEP visits, hx SA; denies PMHx; hx marijuana use; BIBS for CAH.  Pt states he has CAH to kill himself and tried to hang himself from a pole in his basement using a belt today but was stopped by a friend.  Pt reports depressed mood, worry, poor sleep, decreased appetite, +SI, +paranoia ("the voices make me paranoid").  Pt states he goes to a day program usually but hasn't been able to attend for the past 2 days due to symptoms.  He is unable to provide any collateral contact numbers at this time.    In IPP, pt presents oddly-related, calm and cooperative with fair eye contact. He endorses CAH telling him to "kill myself" "to hang myself", reports CAH began last week. He states he was 27 y/o when he first had AH, reports multiple prior hospitalizations and medication trials. He reports he was last hospitalized 2 weeks ago for similar sx, he is unable to recall which hospital, and restarted on haldol and cogentin. He states medications were helpful and requests to restart. He initially reports he was adherent to medications, then states he may have missed doses. He denies intent to act on CAH. He appears future-oriented and goal-directed, requesting SW for information regarding housing assistance and SSD. He denies VH. He endorses paranoia towards AH, states he worries the voice is after him. He reports d/t AH, he has been with low mood a/w feelings of hopelessness and SI. He denies changes to his appetite, sleep, energy and motivation. He  denies sx suggestive of elijah. Denies HI/I/P.    Pt refuses to provide personal collateral information at this time.     Spoke with pt's OP pharmacy, [facility] - haldol 10 mg bid, cogentin 0.5 mg bid (last p/u 4/18/22, Dr. Anderson, [phone]); reports h/o abilify 15 daily, folic acid 1 mg day

## 2022-05-18 NOTE — PROGRESS NOTE BEHAVIORAL HEALTH - NSBHCHARTREVIEWINVESTIGATE_PSY_A_CORE FT
negative
< from: 12 Lead ECG (05.12.22 @ 02:34) >      Ventricular Rate 63 BPM    Atrial Rate 63 BPM    P-R Interval 152 ms    QRS Duration 86 ms    Q-T Interval 388 ms    QTC Calculation(Bazett) 397 ms    P Axis 42 degrees    R Axis -39 degrees    T Axis 17 degrees    Diagnosis Line Normal sinus rhythm with sinus arrhythmia  Left axis deviation  Abnormal ECG    < end of copied text >

## 2022-05-18 NOTE — PROGRESS NOTE BEHAVIORAL HEALTH - NSBHFUPINTERVALCCFT_PSY_A_CORE
"The voices are gone"
"I hear voices"
"I'm better"
"I hear voices"
"I'm good"
"The voices are telling me to kill myself"

## 2022-05-18 NOTE — PROGRESS NOTE BEHAVIORAL HEALTH - PERCEPTIONS
Auditory hallucinations
No abnormalities
No abnormalities
Auditory hallucinations

## 2022-05-18 NOTE — PROGRESS NOTE BEHAVIORAL HEALTH - NSBHPTASSESSDT_PSY_A_CORE
13-May-2022 09:30
15-May-2022 20:35
18-May-2022 09:15
17-May-2022 09:30
14-May-2022 14:07
16-May-2022 09:00

## 2022-05-18 NOTE — PROGRESS NOTE BEHAVIORAL HEALTH - NSBHCHARTREVIEWLAB_PSY_A_CORE FT
Comprehensive Metabolic Panel (05.12.22 @ 01:55)   Sodium, Serum: 140 mmol/L   Potassium, Serum: 3.9 mmol/L   Chloride, Serum: 103 mmol/L   Carbon Dioxide, Serum: 24 mmol/L   Anion Gap, Serum: 13 mmol/L   Blood Urea Nitrogen, Serum: 10 mg/dL   Creatinine, Serum: 1.0 mg/dL   Glucose, Serum: 103 mg/dL   Calcium, Total Serum: 9.9 mg/dL   Protein Total, Serum: 6.3 g/dL   Albumin, Serum: 4.5 g/dL   Bilirubin Total, Serum: 0.9 mg/dL   Alkaline Phosphatase, Serum: 69 U/L   Aspartate Aminotransferase (AST/SGOT): 14 U/L   Alanine Aminotransferase (ALT/SGPT): 19 U/L   eGFR: 98  Complete Blood Count + Automated Diff (05.12.22 @ 01:55)   WBC Count: 6.13 K/uL   RBC Count: 5.93 M/uL   Hemoglobin: 15.7 g/dL   Hematocrit: 47.8 %   Mean Cell Volume: 80.6 fL   Mean Cell Hemoglobin: 26.5 pg   Mean Cell Hemoglobin Conc: 32.8 g/dL   Red Cell Distrib Width: 13.4 %   Platelet Count - Automated: 169 K/uL   Auto Neutrophil #: 4.25 K/uL   Auto Lymphocyte #: 0.70 K/uL   Auto Monocyte #: 0.61 K/uL   Auto Eosinophil #: 0.46 K/uL   Auto Basophil #: 0.04 K/uL   Auto Neutrophil %: 69.3  Auto Lymphocyte %: 11.4 %   Auto Monocyte %: 10.0 %   Auto Eosinophil %: 7.5 %   Auto Basophil %: 0.7 %   Auto Immature Granulocyte %: 1.1: (Includes meta, myelo and promyelocytes) %   Nucleated RBC: 0 /100 WBCs

## 2022-05-18 NOTE — PROGRESS NOTE BEHAVIORAL HEALTH - PRIMARY DX
Schizophrenia, unspecified type

## 2022-05-18 NOTE — DISCHARGE NOTE BEHAVIORAL HEALTH - NSBHDCSUICFCTRMIT_PSY_A_CORE
Patient has been medication and treatment adherent. Patient is future-oriented and goal-directed, reports goal to f/u with  for housing assistance. Patient verbalizing reasons for living. Patient with improved insight into events that led up to hospitalization. Patient to use positive coping skills learned during the course of the inpatient hospitalization, eg ST[employer] Skill. Patient verbalized willingness to seek care in moment of crisis. Patient is agreeable that should he have any thoughts of hurting himself or others, he will call 911, return to the ED.

## 2022-05-18 NOTE — DISCHARGE NOTE BEHAVIORAL HEALTH - FAMILY HISTORY OF PSYCHIATRIC ILLNESS
As noted above.  Pt reports one child, age 19 y/o living with her mother. Reports highest level of education 11th grade. Reports legal hx including arrest for selling drugs.   As per chart review, legal hx of fighting

## 2022-05-18 NOTE — DISCHARGE NOTE BEHAVIORAL HEALTH - NSBHDCSUICPROTECTFT_PSY_A_CORE
Medication and treatment adherence, future orientation, ability to state reasons for living, improved insight, motivation to maintain sobriety, willingness to seek care in moment of crisis

## 2022-05-18 NOTE — PROGRESS NOTE BEHAVIORAL HEALTH - NSBHADMITMEDEDUDETAILS_A_CORE FT
Educated on risks and benefits of haldol and cogentin, and side effects profile. Pt verbalized understanding.
Psychoeducation provided on risks and benefits of haldol decanoate, and side effects profile. Pt refuses start at this time.
Educated on risks and benefits of haldol and cogentin, and side effects profile.
Educated on risks and benefits of haldol and cogentin, and side effects profile.

## 2022-05-18 NOTE — PROGRESS NOTE BEHAVIORAL HEALTH - NSBHFUPINTERVALHXFT_PSY_A_CORE
Pt seen and evaluated, chart reviewed. As per nursing report, no acute events, no PRNs. On evaluation, pt presents pleasant and calm, linear and well-groomed. He reports he is doing well and ready for discharge. He reports continued resolution of AH, reports relief. He reports good mood, sleep and appetite. Denies VH, paranoia. He denies SI/HI, intent and plan. Pt presents future-oriented and goal-directed, agrees to OP f/u and goal to f/u with housing assistance. Pt adherent to medications, denies negative side effects. Psychoeducation provided on risks and benefits of haldol decanoate, pt refuses at this time. Pt visible on unit, in behavioral control. Discharge planning ensues.

## 2022-05-18 NOTE — DISCHARGE NOTE BEHAVIORAL HEALTH - NS MD DC FALL RISK RISK
For information on Fall & Injury Prevention, visit: [email or web address] OR  [email or web address] OR  https://www.cdc.gov/steadi/patient.html

## 2022-05-18 NOTE — PROGRESS NOTE BEHAVIORAL HEALTH - NSBHADMITIPBHPROVFT_PSY_A_CORE
Pt denies OP provider, he is unable to recall, states "in [place]". As per his pharmacy, last prescriptions from Dr. Anderson, [phone] - line not working
Pt denies OP provider, he is unable to recall, states "in [place]". As per his pharmacy, last prescriptions from Dr. Anderson, [phone] - line not working
Pt denies OP provider, he is unable to recall, states "in [place]". As per his pharmacy, last prescriptions from Dr. Anderson, [phone] - line not working
Pt denies OP provider, he is unable to recall, states "in [place]". As per his pharmacy, last prescriptions from Dr. Anderson, [phone] - line not working

## 2022-05-18 NOTE — DISCHARGE NOTE BEHAVIORAL HEALTH - NSBHDCRESOURCESOTHERFT_PSY_A_CORE
Call [facility] 24/7/365 at [phone] ([phone])    [facility] [phone]

## 2022-05-18 NOTE — PROGRESS NOTE BEHAVIORAL HEALTH - NSBHCHARTREVIEWVS_PSY_A_CORE FT
Vital Signs Last 24 Hrs  T(C): 36.4 (12 May 2022 16:33), Max: 36.4 (12 May 2022 15:50)  T(F): 97.6 (12 May 2022 16:33), Max: 97.6 (12 May 2022 16:33)  HR: 76 (12 May 2022 16:33) (64 - 76)  BP: 128/68 (12 May 2022 16:33) (122/72 - 128/68)  BP(mean): --  RR: 18 (12 May 2022 16:33) (18 - 18)  SpO2: --
Vital Signs Last 24 Hrs  T(C): 35.1 (16 May 2022 05:52), Max: 36.7 (15 May 2022 16:00)  T(F): 95.2 (16 May 2022 05:52), Max: 98 (15 May 2022 16:00)  HR: 75 (16 May 2022 05:52) (74 - 75)  BP: 121/71 (16 May 2022 05:52) (117/62 - 121/71)  BP(mean): --  RR: 16 (15 May 2022 16:00) (16 - 16)  SpO2: --
Vital Signs Last 24 Hrs  T(C): 36.1 (17 May 2022 08:47), Max: 36.1 (17 May 2022 06:29)  T(F): 96.9 (17 May 2022 08:47), Max: 97 (17 May 2022 06:29)  HR: 69 (17 May 2022 08:47) (60 - 76)  BP: 118/71 (17 May 2022 08:47) (114/70 - 118/81)  BP(mean): --  RR: 18 (17 May 2022 08:47) (16 - 18)  SpO2: --
T(C): 36.2 (14 May 2022 09:09), Max: 36.2 (14 May 2022 09:09)  T(F): 97.2 (14 May 2022 09:09), Max: 97.2 (14 May 2022 09:09)  HR: 84 (14 May 2022 09:09) (68 - 84)  BP: 128/63 (14 May 2022 09:09) (111/69 - 128/63)  BP(mean): --  ABP: --  ABP(mean): --  RR: 18 (14 May 2022 09:09) (16 - 18)  SpO2: --
Vital Signs Last 24 Hrs  T(C): 36.7 (15 May 2022 16:00), Max: 36.8 (15 May 2022 10:40)  T(F): 98 (15 May 2022 16:00), Max: 98.2 (15 May 2022 10:40)  HR: 74 (15 May 2022 16:00) (74 - 91)  BP: 117/62 (15 May 2022 16:00) (115/69 - 117/62)  BP(mean): --  RR: 16 (15 May 2022 16:00) (16 - 18)  SpO2: --
Vital Signs Last 24 Hrs  T(C): 36.2 (18 May 2022 08:49), Max: 36.4 (17 May 2022 16:14)  T(F): 97.2 (18 May 2022 08:49), Max: 97.6 (17 May 2022 16:14)  HR: 72 (18 May 2022 08:49) (72 - 72)  BP: 117/73 (18 May 2022 08:49) (117/73 - 130/58)  BP(mean): --  RR: 16 (18 May 2022 08:49) (16 - 18)  SpO2: --

## 2022-05-18 NOTE — DISCHARGE NOTE BEHAVIORAL HEALTH - NSTOBACCOREFERRAL_GEN_A_NCS
Yes Patient registered and referred to the [place] [facility] Program. Phone appointment scheduled for 5/21/22 at 0900./Yes

## 2022-05-18 NOTE — DISCHARGE NOTE BEHAVIORAL HEALTH - PAST PSYCHIATRIC HISTORY
As noted above.  As per chart review, As per chart review, per PSYCKES: Schizophrenia | Schizoaffective Disorder | Unspecified/Other Depressive Disorder | Unspecified/Other Anxiety Disorder | Major Depressive Disorder | Alcohol related disorders | Adjustment Disorder | Unspecified/Other Psychotic Disorders | Brief Psychotic Disorder (ICD10 Only) | Cannabis related disorders | Other psychoactive substance related disorders | Paranoid Personality Disorder | Selective Mutism | Cocaine related disorders | Delusional Disorder | Generalized Anxiety Disorder | Other Mental Disorders | Substance-Induced Psychotic Disorder | Unspecified/Other Personality Disorder  Past admissions at [facility], [facility], [facility], [facility], [facility]

## 2022-05-18 NOTE — DISCHARGE NOTE BEHAVIORAL HEALTH - NSBHDCSUICFCTRSFT_PSY_A_CORE
Affective dysregulation, medication/treatment nonadherence, polysubstance abuse, poor distress tolerance

## 2022-05-18 NOTE — PROGRESS NOTE BEHAVIORAL HEALTH - NSBHATTESTSEENBY_PSY_A_CORE
NP without Attending Psychiatrist
NP without Attending Psychiatrist
attending Psychiatrist without NP/Trainee
NP without Attending Psychiatrist
attending Psychiatrist without NP/Trainee
NP without Attending Psychiatrist

## 2022-05-18 NOTE — PROGRESS NOTE BEHAVIORAL HEALTH - SUMMARY
42 y/o male; domiciled alone; unemployed, on public assistance; PPHx of schizophrenia, prior admissions and CPEP visits, hx SA; denies PMHx; hx marijuana use; BIBS for CAH. Pt states he has CAH to kill himself and tried to hang himself from a pole in his basement using a belt today but was stopped by a friend. On evaluation, pt presents oddly-related, calm and cooperative, states he has been depressed and suicidal d/t CAH. He reports h/o good response to haldol and cogentin, requesting to restart. Pt also presents future-oriented and goal-directed, asking SW on housing and financial assistance programs. His presentation is unclear at this time, however decompensation of schizophrenia in setting of medication nonadherence is high on differential. R/o SIMD/psychosis, UDS negative. On evaluation, pt presents linear and with good ADLs, reports good mood and sleep with resolution of AH and SI/I/P. Psychoeducation provided on risks and benefits of haldol decanoate, pt refuses start at this time. Discharge planning ensues.     #Schizophrenia  -start haldol 5 mg bid --> titrate haldol 5 mg qAM/ 10 mg qHS (5/16)  -encourage MCKINNEY --> pt refuses at this time  -c/w cogentin 0.5 mg bid    #Cannabis abuse  -no treatment protocol indicated at this time     #Suspected skin infestation due to findings of lice and c/o pruitis  -medicine consult  -s/p permethrin cream rinse  -isolation precautions --> d/c    #Agitation  -for agitation not amenable to verbal redirection, may give haldol 5 mg q6h prn and/or ativan 2 mg q6h prn with escalation to IM if pt is a danger to self or/and others with repeat EKG to ensure QTc <500 ms

## 2022-05-18 NOTE — PROGRESS NOTE BEHAVIORAL HEALTH - RISK ASSESSMENT
Risk factors include limited social supports, likely residential instability, s/p SA, hx SA, h/o multiple IPP/CPEP, h/o medication and treatment nonandherence, ongoing CAH and SI, hx substance use. Protective factors include help seeking, future orientation, cooperative with treatment, h/o good treatment response, willingness to utilize crisis services/ED in times of crisis.
Risk factors:  limited social supports, likely residential instability, s/p SA, hx SA, h/o multiple IPP/CPEP, h/o medication and treatment nonandherence, ongoing CAH and SI, hx substance use.   Protective factors include help seeking, future orientation, cooperative with treatment, h/o good treatment response, willingness to utilize crisis services/ED in times of crisis.
Risk factors include limited social supports, likely residential instability, s/p SA, hx SA, h/o multiple IPP/CPEP, h/o medication and treatment nonandherence, ongoing CAH and SI, hx substance use. Protective factors include help seeking, future orientation, cooperative with treatment, h/o good treatment response, willingness to utilize crisis services/ED in times of crisis.
Risk factors:  limited social supports, likely residential instability, s/p SA, hx SA, h/o multiple IPP/CPEP, h/o medication and treatment nonandherence, ongoing CAH and SI, hx substance use.   Protective factors include help seeking, future orientation, cooperative with treatment, h/o good treatment response, willingness to utilize crisis services/ED in times of crisis.
Risk factors include limited social supports, likely residential instability, s/p SA, hx SA, h/o multiple IPP/CPEP, h/o medication and treatment nonandherence, ongoing CAH and SI, hx substance use. Protective factors include help seeking, future orientation, cooperative with treatment, h/o good treatment response, willingness to utilize crisis services/ED in times of crisis.
Risk factors include affective dysregulation, limited social supports, likely residential instability, s/p SA, hx SA, h/o multiple IPP/CPEP, h/o medication and treatment nonandherence, ongoing CAH and SI, hx substance use. Protective factors include help seeking, future orientation, cooperative with treatment, h/o good treatment response, willingness to utilize crisis services/ED in times of crisis.

## 2022-05-18 NOTE — DISCHARGE NOTE BEHAVIORAL HEALTH - NSBHDCSUICFCTROTHERFT_PSY_A_CORE
Patient and provider identified future stressors nonadherence with medication/outpatient follow up, relapse with illicit substances, financial instability.

## 2022-05-19 VITALS
DIASTOLIC BLOOD PRESSURE: 74 MMHG | SYSTOLIC BLOOD PRESSURE: 119 MMHG | HEART RATE: 62 BPM | TEMPERATURE: 98 F | RESPIRATION RATE: 17 BRPM

## 2022-05-19 PROBLEM — Z00.00 ENCOUNTER FOR PREVENTIVE HEALTH EXAMINATION: Status: ACTIVE | Noted: 2022-05-19

## 2022-05-19 PROBLEM — F20.9 SCHIZOPHRENIA, UNSPECIFIED: Chronic | Status: ACTIVE | Noted: 2022-05-12

## 2022-05-19 PROBLEM — Z72.0 TOBACCO USE: Chronic | Status: ACTIVE | Noted: 2022-05-12

## 2022-05-19 PROCEDURE — 99238 HOSP IP/OBS DSCHRG MGMT 30/<: CPT

## 2022-05-19 RX ADMIN — LORATADINE 10 MILLIGRAM(S): 10 TABLET ORAL at 08:17

## 2022-05-19 RX ADMIN — Medication 0.5 MILLIGRAM(S): at 08:17

## 2022-05-19 RX ADMIN — HALOPERIDOL DECANOATE 5 MILLIGRAM(S): 100 INJECTION INTRAMUSCULAR at 08:17

## 2022-05-19 NOTE — BH SAFETY PLAN - LOCAL URGENT CARE ADDRESS
[facility] is located at [address] [place] [address] (inside the main hospital)

## 2022-05-23 ENCOUNTER — APPOINTMENT (OUTPATIENT)
Dept: PSYCHIATRY | Facility: CLINIC | Age: 42
End: 2022-05-23

## 2022-05-24 ENCOUNTER — APPOINTMENT (OUTPATIENT)
Dept: PSYCHIATRY | Facility: CLINIC | Age: 42
End: 2022-05-24

## 2022-05-25 DIAGNOSIS — Z87.898 PERSONAL HISTORY OF OTHER SPECIFIED CONDITIONS: ICD-10-CM

## 2022-05-25 DIAGNOSIS — Z20.7 CONTACT WITH AND (SUSPECTED) EXPOSURE TO PEDICULOSIS, ACARIASIS AND OTHER INFESTATIONS: ICD-10-CM

## 2022-05-25 DIAGNOSIS — L29.9 PRURITUS, UNSPECIFIED: ICD-10-CM

## 2022-05-25 DIAGNOSIS — F20.9 SCHIZOPHRENIA, UNSPECIFIED: ICD-10-CM

## 2022-05-25 DIAGNOSIS — F12.10 CANNABIS ABUSE, UNCOMPLICATED: ICD-10-CM

## 2022-05-25 DIAGNOSIS — R45.851 SUICIDAL IDEATIONS: ICD-10-CM

## 2022-05-25 DIAGNOSIS — Z59.01 SHELTERED HOMELESSNESS: ICD-10-CM

## 2022-05-25 DIAGNOSIS — R45.1 RESTLESSNESS AND AGITATION: ICD-10-CM

## 2022-05-25 DIAGNOSIS — F17.210 NICOTINE DEPENDENCE, CIGARETTES, UNCOMPLICATED: ICD-10-CM

## 2022-05-25 DIAGNOSIS — B85.2 PEDICULOSIS, UNSPECIFIED: ICD-10-CM

## 2022-05-25 SDOH — ECONOMIC STABILITY - HOUSING INSECURITY: SHELTERED HOMELESSNESS: Z59.01

## 2022-05-27 RX ORDER — NICOTINE POLACRILEX 2 MG
1 GUM BUCCAL
Qty: 168 | Refills: 0
Start: 2022-05-27 | End: 2022-06-09

## 2022-05-27 RX ORDER — HALOPERIDOL DECANOATE 100 MG/ML
1 INJECTION INTRAMUSCULAR
Qty: 14 | Refills: 0
Start: 2022-05-27 | End: 2022-06-09

## 2022-05-27 RX ORDER — BENZTROPINE MESYLATE 1 MG
1 TABLET ORAL
Qty: 28 | Refills: 0
Start: 2022-05-27 | End: 2022-06-09

## 2022-05-27 NOTE — CHART NOTE - NSCHARTNOTEFT_GEN_A_CORE
Patient remains on unit for continued treatment safety and observation. Patient is compliant with treatment as well as unit protocol. Writer meets with patient on rounds patient approachable and appropriately engaged. Patient continues to present with secondary gain to admission states hed like help finding housing because he doesn't like here he is currently. Patient requests assistance completing SSDI application, support and education provided. Patient reports improved mood and improved sleep. Patient states he is experiencing less AVH and he is better able to ignore it at this point in time.  Patient in good behavioral control. Patient responds well to staff direction and redirection.  Patient to remain on unit until cleared by attending for discharge. Patient not cleared for discharge at this time.       Mental Status Exam:    Mood – Low  Sleep - Fair  Appetite - Good  ADLs - Fair  Thought Process – + AVH  Observation – [id number]
Pt was seen, evaluated, chart reviewed. As per nursing staff, no acute events, no PRNs. On evaluation, pt reports that he is doing well and ready for discharge. Has been in good behavioral control. He is compliant with medications, denies negative side effects. Endorsed improved sleep and appetite. Denied auditory/visual hallucinations. Denied paranoia. Denied suicidal/homicidal ideation, intent or plan. Pt is for discharge today. Agreeable with outpatient follow up. Pt verbalizes understanding and agrees to discharge instructions.
Social Work Discharge Note:    Patient is for discharge today. She is alert and oriented x3. Mood is improved. Anxiety has decreased. Insight and judgment have improved. Suicidal / homicidal ideation denied.    Patient will be discharged to his home on [address]. Patient has been referred for continued mental health treatment in the community. Patient aware and amenable.      Discharge huddle occurred prior to discharge. At that time no concerns were verbalized regarding patient’s return to the community.    Patient is aware and agreeable to discharge today. [facility] ordered ref # [id number].    Patient confirmed phone number .
Social Work Note:    Patient remains on the unit for continued treatment, safety, and observation.  Treatment team met with patient on morning rounds. Patient was cooperative and engaging.  Patient reports he feels good is looking forward to seeing his family and is eager to DC. Patient taking medications as directed and is visible on unit. Patient denies SI/HI and A/V/H.  Patient contracts for safety and is in good behavioral control at this time. Once stable for discharge, patient will return to his home on [address]. Patient will be referred for continued mental health services in the community. Patient is aware and amenable. Patient is slated for discharge providing no regressive behaviors and or medical complications.         Mental Status Exam:    Mood – Neutral  Sleep - Good  Appetite – Good  ADLs – WNL  Thought Process – Linear   Observation – Routine q10.
Called by pt stating he is unable to p/u discharge medications from [facility] d/t insurance issues, and requests for prescriptions to be sent to his established pharmacy, [facility] ([address] [place] [address]; [phone]). 14 days supply of discharge medications sent.
Social Work Admit Note:    Patient is 41 years of age male who was admitted for evaluation due to suicidal ideation and CAH. Patient reports he hears voices to harm self. Patient reports he recently attempted to hang self because the voices told him to. Upon approach patient asks treatment team if we provide housing here, also asks if we apply for SSDI here. Patient states he is housed currently but has a history of homelessness. Patient states he has a number of inpatient psych admissions most recently at [facility]. Patient states he does not follow up with any psychiatric providers in the community. Patient denies alcohol or drug use. Patient reports he is not employed but recently completed a training program through [facility]. Patient denies intent or plan. Patient states he is hearing voices to kill himself currently. Patient contracts for safety on unit. Patient denies HI. Patient in good behavioral control and is amenable to medication adjustment and review. Patient states he just wants to feel better. Patient admitted to unit voluntarily for treatment safety and observation.     Sexual History – patient identifies as heterosexual. 	  Family relationships and history – patient reports strained relations.    Leisure Activity Assessment – [facility].       Community Supports – None reported.   Employment – patient is not employed at this time.   Substance Use Assessment – use of alcohol or other denied.     History of suicidality or self- injurious behaviors – prior attempts.      Significant Loses –none identified.    Life Goals – Patient states he wants to feel better.         will continue to meet with patient 1:1 and with treatment team daily.      Discharge plan is for continued mental health treatment in outpatient setting.      Please refer to Social Work Psychosocial for additional information.

## 2022-06-19 NOTE — DISCHARGE NOTE BEHAVIORAL HEALTH - NSBHDCRESPONSEFT_PSY_A_CORE
no Pt has made significant progress over the course of hospitalization. With continuous psychotherapy from the treatment team and the medications, patient reports feeling better sleeping and eating well. Thought process and insight improved. Pt was calm and cooperative and was in good behavioral control. Patient denied any suicidal or homicidal ideations. Patient denied any auditory or visual hallucinations. Pt was evaluated by treatment team, pt is stable for discharge and patient shows no imminent danger to self, others or property at this time. Pt understands and agrees with treatment plan and following up with outpatient. Psychoeducation provided regarding diagnosis, medications, treatment and follow up. Risks, benefits, alternatives discussed, all questions and concerns addressed and answered.

## 2023-04-07 ENCOUNTER — EMERGENCY (EMERGENCY)
Facility: HOSPITAL | Age: 43
LOS: 0 days | Discharge: HOME | End: 2023-04-08
Attending: EMERGENCY MEDICINE
Payer: MEDICAID

## 2023-04-07 VITALS
TEMPERATURE: 98 F | OXYGEN SATURATION: 97 % | DIASTOLIC BLOOD PRESSURE: 76 MMHG | RESPIRATION RATE: 17 BRPM | HEART RATE: 74 BPM | SYSTOLIC BLOOD PRESSURE: 132 MMHG

## 2023-04-07 DIAGNOSIS — R10.9 UNSPECIFIED ABDOMINAL PAIN: ICD-10-CM

## 2023-04-07 DIAGNOSIS — F20.9 SCHIZOPHRENIA, UNSPECIFIED: ICD-10-CM

## 2023-04-07 DIAGNOSIS — R11.2 NAUSEA WITH VOMITING, UNSPECIFIED: ICD-10-CM

## 2023-04-07 DIAGNOSIS — Z20.822 CONTACT WITH AND (SUSPECTED) EXPOSURE TO COVID-19: ICD-10-CM

## 2023-04-07 DIAGNOSIS — R19.7 DIARRHEA, UNSPECIFIED: ICD-10-CM

## 2023-04-07 DIAGNOSIS — R94.31 ABNORMAL ELECTROCARDIOGRAM [ECG] [EKG]: ICD-10-CM

## 2023-04-07 DIAGNOSIS — Z90.49 ACQUIRED ABSENCE OF OTHER SPECIFIED PARTS OF DIGESTIVE TRACT: Chronic | ICD-10-CM

## 2023-04-07 DIAGNOSIS — Z87.891 PERSONAL HISTORY OF NICOTINE DEPENDENCE: ICD-10-CM

## 2023-04-07 DIAGNOSIS — R10.13 EPIGASTRIC PAIN: ICD-10-CM

## 2023-04-07 DIAGNOSIS — R11.0 NAUSEA: ICD-10-CM

## 2023-04-07 PROCEDURE — 93005 ELECTROCARDIOGRAM TRACING: CPT

## 2023-04-07 PROCEDURE — 99285 EMERGENCY DEPT VISIT HI MDM: CPT

## 2023-04-07 PROCEDURE — 85025 COMPLETE CBC W/AUTO DIFF WBC: CPT

## 2023-04-07 PROCEDURE — 36415 COLL VENOUS BLD VENIPUNCTURE: CPT

## 2023-04-07 PROCEDURE — 83690 ASSAY OF LIPASE: CPT

## 2023-04-07 PROCEDURE — 74177 CT ABD & PELVIS W/CONTRAST: CPT | Mod: MA

## 2023-04-07 PROCEDURE — 96374 THER/PROPH/DIAG INJ IV PUSH: CPT

## 2023-04-07 PROCEDURE — 84484 ASSAY OF TROPONIN QUANT: CPT

## 2023-04-07 PROCEDURE — 71045 X-RAY EXAM CHEST 1 VIEW: CPT

## 2023-04-07 PROCEDURE — G0378: CPT

## 2023-04-07 PROCEDURE — 83605 ASSAY OF LACTIC ACID: CPT

## 2023-04-07 PROCEDURE — 87635 SARS-COV-2 COVID-19 AMP PRB: CPT

## 2023-04-07 PROCEDURE — 80053 COMPREHEN METABOLIC PANEL: CPT

## 2023-04-07 PROCEDURE — 87086 URINE CULTURE/COLONY COUNT: CPT

## 2023-04-07 PROCEDURE — 99285 EMERGENCY DEPT VISIT HI MDM: CPT | Mod: 25

## 2023-04-07 PROCEDURE — 81003 URINALYSIS AUTO W/O SCOPE: CPT

## 2023-04-08 VITALS
HEART RATE: 90 BPM | DIASTOLIC BLOOD PRESSURE: 63 MMHG | RESPIRATION RATE: 18 BRPM | TEMPERATURE: 98 F | OXYGEN SATURATION: 98 % | SYSTOLIC BLOOD PRESSURE: 132 MMHG

## 2023-04-08 LAB
ALBUMIN SERPL ELPH-MCNC: 4.3 G/DL — SIGNIFICANT CHANGE UP (ref 3.5–5.2)
ALP SERPL-CCNC: 67 U/L — SIGNIFICANT CHANGE UP (ref 30–115)
ALT FLD-CCNC: 14 U/L — SIGNIFICANT CHANGE UP (ref 0–41)
ANION GAP SERPL CALC-SCNC: 10 MMOL/L — SIGNIFICANT CHANGE UP (ref 7–14)
APPEARANCE UR: CLEAR — SIGNIFICANT CHANGE UP
AST SERPL-CCNC: 14 U/L — SIGNIFICANT CHANGE UP (ref 0–41)
BASOPHILS # BLD AUTO: 0.04 K/UL — SIGNIFICANT CHANGE UP (ref 0–0.2)
BASOPHILS NFR BLD AUTO: 1 % — SIGNIFICANT CHANGE UP (ref 0–1)
BILIRUB SERPL-MCNC: 1.4 MG/DL — HIGH (ref 0.2–1.2)
BILIRUB UR-MCNC: NEGATIVE — SIGNIFICANT CHANGE UP
BUN SERPL-MCNC: 9 MG/DL — LOW (ref 10–20)
CALCIUM SERPL-MCNC: 9.4 MG/DL — SIGNIFICANT CHANGE UP (ref 8.4–10.5)
CHLORIDE SERPL-SCNC: 107 MMOL/L — SIGNIFICANT CHANGE UP (ref 98–110)
CO2 SERPL-SCNC: 27 MMOL/L — SIGNIFICANT CHANGE UP (ref 17–32)
COLOR SPEC: SIGNIFICANT CHANGE UP
CREAT SERPL-MCNC: 1 MG/DL — SIGNIFICANT CHANGE UP (ref 0.7–1.5)
DIFF PNL FLD: NEGATIVE — SIGNIFICANT CHANGE UP
EGFR: 98 ML/MIN/1.73M2 — SIGNIFICANT CHANGE UP
EOSINOPHIL # BLD AUTO: 0.25 K/UL — SIGNIFICANT CHANGE UP (ref 0–0.7)
EOSINOPHIL NFR BLD AUTO: 6.1 % — SIGNIFICANT CHANGE UP (ref 0–8)
GLUCOSE SERPL-MCNC: 88 MG/DL — SIGNIFICANT CHANGE UP (ref 70–99)
GLUCOSE UR QL: NEGATIVE — SIGNIFICANT CHANGE UP
HCT VFR BLD CALC: 45.1 % — SIGNIFICANT CHANGE UP (ref 42–52)
HGB BLD-MCNC: 15.2 G/DL — SIGNIFICANT CHANGE UP (ref 14–18)
IMM GRANULOCYTES NFR BLD AUTO: 0.7 % — HIGH (ref 0.1–0.3)
KETONES UR-MCNC: NEGATIVE — SIGNIFICANT CHANGE UP
LACTATE SERPL-SCNC: 2.2 MMOL/L — HIGH (ref 0.7–2)
LACTATE SERPL-SCNC: 2.3 MMOL/L — HIGH (ref 0.7–2)
LEUKOCYTE ESTERASE UR-ACNC: NEGATIVE — SIGNIFICANT CHANGE UP
LIDOCAIN IGE QN: 53 U/L — SIGNIFICANT CHANGE UP (ref 7–60)
LYMPHOCYTES # BLD AUTO: 0.52 K/UL — LOW (ref 1.2–3.4)
LYMPHOCYTES # BLD AUTO: 12.8 % — LOW (ref 20.5–51.1)
MCHC RBC-ENTMCNC: 26.7 PG — LOW (ref 27–31)
MCHC RBC-ENTMCNC: 33.7 G/DL — SIGNIFICANT CHANGE UP (ref 32–37)
MCV RBC AUTO: 79.3 FL — LOW (ref 80–94)
MONOCYTES # BLD AUTO: 0.73 K/UL — HIGH (ref 0.1–0.6)
MONOCYTES NFR BLD AUTO: 17.9 % — HIGH (ref 1.7–9.3)
NEUTROPHILS # BLD AUTO: 2.5 K/UL — SIGNIFICANT CHANGE UP (ref 1.4–6.5)
NEUTROPHILS NFR BLD AUTO: 61.5 % — SIGNIFICANT CHANGE UP (ref 42.2–75.2)
NITRITE UR-MCNC: NEGATIVE — SIGNIFICANT CHANGE UP
NRBC # BLD: 0 /100 WBCS — SIGNIFICANT CHANGE UP (ref 0–0)
PH UR: 6.5 — SIGNIFICANT CHANGE UP (ref 5–8)
PLATELET # BLD AUTO: 158 K/UL — SIGNIFICANT CHANGE UP (ref 130–400)
POTASSIUM SERPL-MCNC: 3.6 MMOL/L — SIGNIFICANT CHANGE UP (ref 3.5–5)
POTASSIUM SERPL-SCNC: 3.6 MMOL/L — SIGNIFICANT CHANGE UP (ref 3.5–5)
PROT SERPL-MCNC: 6.3 G/DL — SIGNIFICANT CHANGE UP (ref 6–8)
PROT UR-MCNC: SIGNIFICANT CHANGE UP
RBC # BLD: 5.69 M/UL — SIGNIFICANT CHANGE UP (ref 4.7–6.1)
RBC # FLD: 13.2 % — SIGNIFICANT CHANGE UP (ref 11.5–14.5)
SARS-COV-2 RNA SPEC QL NAA+PROBE: SIGNIFICANT CHANGE UP
SARS-COV-2 RNA SPEC QL NAA+PROBE: SIGNIFICANT CHANGE UP
SODIUM SERPL-SCNC: 144 MMOL/L — SIGNIFICANT CHANGE UP (ref 135–146)
SP GR SPEC: >1.05 (ref 1.01–1.03)
TROPONIN T SERPL-MCNC: <0.01 NG/ML — SIGNIFICANT CHANGE UP
TROPONIN T SERPL-MCNC: <0.01 NG/ML — SIGNIFICANT CHANGE UP
UROBILINOGEN FLD QL: ABNORMAL
WBC # BLD: 4.07 K/UL — LOW (ref 4.8–10.8)
WBC # FLD AUTO: 4.07 K/UL — LOW (ref 4.8–10.8)

## 2023-04-08 PROCEDURE — 99223 1ST HOSP IP/OBS HIGH 75: CPT

## 2023-04-08 PROCEDURE — 71045 X-RAY EXAM CHEST 1 VIEW: CPT | Mod: 26

## 2023-04-08 PROCEDURE — 74177 CT ABD & PELVIS W/CONTRAST: CPT | Mod: 26,MA

## 2023-04-08 PROCEDURE — 93010 ELECTROCARDIOGRAM REPORT: CPT | Mod: 76

## 2023-04-08 PROCEDURE — 93010 ELECTROCARDIOGRAM REPORT: CPT | Mod: 77

## 2023-04-08 RX ORDER — DIPHENHYDRAMINE HCL 50 MG
25 CAPSULE ORAL ONCE
Refills: 0 | Status: COMPLETED | OUTPATIENT
Start: 2023-04-08 | End: 2023-04-08

## 2023-04-08 RX ORDER — ACETAMINOPHEN 500 MG
650 TABLET ORAL ONCE
Refills: 0 | Status: COMPLETED | OUTPATIENT
Start: 2023-04-08 | End: 2023-04-08

## 2023-04-08 RX ORDER — SODIUM CHLORIDE 9 MG/ML
1000 INJECTION, SOLUTION INTRAVENOUS ONCE
Refills: 0 | Status: COMPLETED | OUTPATIENT
Start: 2023-04-08 | End: 2023-04-08

## 2023-04-08 RX ORDER — SODIUM CHLORIDE 9 MG/ML
1000 INJECTION INTRAMUSCULAR; INTRAVENOUS; SUBCUTANEOUS ONCE
Refills: 0 | Status: COMPLETED | OUTPATIENT
Start: 2023-04-08 | End: 2023-04-08

## 2023-04-08 RX ORDER — FAMOTIDINE 10 MG/ML
20 INJECTION INTRAVENOUS ONCE
Refills: 0 | Status: COMPLETED | OUTPATIENT
Start: 2023-04-08 | End: 2023-04-08

## 2023-04-08 RX ADMIN — FAMOTIDINE 20 MILLIGRAM(S): 10 INJECTION INTRAVENOUS at 01:20

## 2023-04-08 RX ADMIN — SODIUM CHLORIDE 1000 MILLILITER(S): 9 INJECTION, SOLUTION INTRAVENOUS at 03:32

## 2023-04-08 RX ADMIN — Medication 650 MILLIGRAM(S): at 01:19

## 2023-04-08 RX ADMIN — SODIUM CHLORIDE 2000 MILLILITER(S): 9 INJECTION INTRAMUSCULAR; INTRAVENOUS; SUBCUTANEOUS at 01:15

## 2023-04-08 NOTE — ED CDU PROVIDER INITIAL DAY NOTE - PROGRESS NOTE DETAILS
patient elpidio dout from dr. guzman, patient comfortbale, no complaint, patient states came to ed for nausea and states feeling better , abdomen ct within normal limits. patient denies any chest pain, informed of ed plan and management srates does not want anymore cardiac workup will folow up with cardiologist. patient refused ccta and further workup

## 2023-04-08 NOTE — ED CDU PROVIDER INITIAL DAY NOTE - OBJECTIVE STATEMENT
Patient is c/o gas like abd pain, intermittent episodes, also was having nausea and come times is constipated and some times has diarrhea. Patient denies cp/sob/syncope. Denies risk factors for PE/DVT. Patient is a poor historian, but denies any recent drug use. EKG was done due to patient non-specific abd pain, initial EKG shows T-wave inversions in III and biphasic T wave in AVF. No old EKG to compare, so was repeat EKG done with worsening T-wave inversions. Patient remained asymptomatic in ED and agreed to be admitted to EDOU for further evaluation of his symptoms and abnormal EKG.

## 2023-04-08 NOTE — ED PROVIDER NOTE - DIFFERENTIAL DIAGNOSIS
Differential Diagnosis Pancreatitis, hepatic failure, obstructive uropathy, diverticulitis, colitis, abscess, bowel obstruction, bowel perforation. Electrolyte abnormalities, FELTON, and GI bleeding.  cardiac ischemia, cardiac arrhythmia, acute coronary syndromes, symptomatic anemia, electrolyte abnormalities including hyponatremia and hypokalemia, and pneumothorax.

## 2023-04-08 NOTE — ED ADULT NURSE NOTE - OBJECTIVE STATEMENT
Pt c/o abdominal pain, vomiting 2x, and diarrhea. Pt calmly resting in bed at this time. Pt is alert and oriented x3. No s/s of respiratory distress. Pt is able to make all needs known. Pt has no further complaints at this time. Pt is pending further reevaluation at this time. Safety and Fall precautions in place as per hospital policy. Will continue to monitor.

## 2023-04-08 NOTE — ED ADULT NURSE NOTE - NS PRO PASSIVE SMOKE EXP
Unknown [20/___] : left eye 20/[unfilled] [No Acute Distress] : no acute distress [Well Nourished] : well nourished [Well Developed] : well developed [Well-Appearing] : well-appearing [Normal Sclera/Conjunctiva] : normal sclera/conjunctiva [PERRL] : pupils equal round and reactive to light [EOMI] : extraocular movements intact [Normal Outer Ear/Nose] : the outer ears and nose were normal in appearance [Normal Oropharynx] : the oropharynx was normal [No JVD] : no jugular venous distention [Supple] : supple [No Lymphadenopathy] : no lymphadenopathy [Thyroid Normal, No Nodules] : the thyroid was normal and there were no nodules present [No Respiratory Distress] : no respiratory distress  [Clear to Auscultation] : lungs were clear to auscultation bilaterally [No Accessory Muscle Use] : no accessory muscle use [Normal Rate] : normal rate  [Regular Rhythm] : with a regular rhythm [Normal S1, S2] : normal S1 and S2 [No Murmur] : no murmur heard [No Carotid Bruits] : no carotid bruits [No Abdominal Bruit] : a ~M bruit was not heard ~T in the abdomen [No Varicosities] : no varicosities [Pedal Pulses Present] : the pedal pulses are present [No Edema] : there was no peripheral edema [No Extremity Clubbing/Cyanosis] : no extremity clubbing/cyanosis [No Palpable Aorta] : no palpable aorta [Soft] : abdomen soft [Non Tender] : non-tender [Non-distended] : non-distended [No Masses] : no abdominal mass palpated [No HSM] : no HSM [Normal Bowel Sounds] : normal bowel sounds [Normal Posterior Cervical Nodes] : no posterior cervical lymphadenopathy [Normal Anterior Cervical Nodes] : no anterior cervical lymphadenopathy [No CVA Tenderness] : no CVA  tenderness [No Spinal Tenderness] : no spinal tenderness [No Joint Swelling] : no joint swelling [Grossly Normal Strength/Tone] : grossly normal strength/tone [No Rash] : no rash [Normal Gait] : normal gait [Coordination Grossly Intact] : coordination grossly intact [No Focal Deficits] : no focal deficits [Deep Tendon Reflexes (DTR)] : deep tendon reflexes were 2+ and symmetric [Normal Affect] : the affect was normal [Normal Insight/Judgement] : insight and judgment were intact [FreeTextEntry1] : 500 R 30 L 20   1000 R 15 L 15   2000 R 15 L 15   4000 R 15 L 15

## 2023-04-08 NOTE — ED PROVIDER NOTE - ATTENDING APP SHARED VISIT CONTRIBUTION OF CARE
Patient is c/o abd pain, epigastric/upper abd pain, associated with nausea and also had diarrhea. Patient is c/o generalized weakness. Denies cp/sob/syncope/presyncope. No trauma.   Vitals reviewed.   Patient is awake, alert, answering questions appropriately, appears comfortable and not in any distress.  Lungs: CTA, no wheezing, no crackles.  Abd: +BS, NT, ND, soft, no rebound, no guarding.   CNS: awake, alert, o x 3, no focal neurologic deficits.  Ext: no E/E/C: pulse+  no calf tenderness.   A/P: Gastroenteritis,   epigastric abd pain/nausea/generalized weakness,   labs, EKG, CT,   reevaluation.

## 2023-04-08 NOTE — ED ADULT NURSE NOTE - NSIMPLEMENTINTERV_GEN_ALL_ED
Implemented All Universal Safety Interventions:  [place] to call system. Call bell, personal items and telephone within reach. Instruct patient to call for assistance. Room bathroom lighting operational. Non-slip footwear when patient is off stretcher. Physically safe environment: no spills, clutter or unnecessary equipment. Stretcher in lowest position, wheels locked, appropriate side rails in place.

## 2023-04-08 NOTE — ED PROVIDER NOTE - CLINICAL SUMMARY MEDICAL DECISION MAKING FREE TEXT BOX
EKG reviewed by me and shows NSR, T-wave inversions in III and AVF, no ST elevations/depressions noted.   Chest X-rays reviewed by me, and shows no acute findings. No Pneumothorax, no free air, no effusions, and these findings discussed with patient.  Laboratory results reviewed and discussed with patient. CBC shows normal WBC count, Hb/Hct and platelet count are WNL. BMP shows electrolytes WNL and no FELTON. Troponin is negative.  Patient remained stable in ED, labs/CXR/EKG findings reviewed and discussed with patient. Discussed with EDOU/OBS provider, patient is admitted to EDOU for further evaluation of her symptoms.

## 2023-04-08 NOTE — ED CDU PROVIDER DISPOSITION NOTE - CLINICAL COURSE
pt with nausea and diarrhea - improved with ivf - abnormal ekg noted - neg trop x2 - offered pt risk stratification but pt refused

## 2023-04-08 NOTE — ED CDU PROVIDER DISPOSITION NOTE - CARE PROVIDER_API CALL
João Salvador (MD)  Cardiovascular Disease; Internal Medicine; Interventional Cardiology  [address] [place] [address]  Phone: [phone]  Fax: [phone]  Follow Up Time: 4-6 Days

## 2023-04-08 NOTE — ED PROVIDER NOTE - NS ED ATTENDING STATEMENT MOD
This was a shared visit with the SUYAPA. I reviewed and verified the documentation and independently performed the documented:

## 2023-04-08 NOTE — ED PROVIDER NOTE - OBJECTIVE STATEMENT
42-year-old male with a past medical history of schizophrenia who presented to ED with abdominal pain.  Reports that symptoms started earlier today; pain is sharp, intermittent, and there is no alleviating or worsening factor.  Also endorses nausea, vomiting, and diarrhea.  Denies recent traveling and antibiotics use.  Denies fever, shortness breath, chest pain, hematuria, dysuria, and change in bowel movement.

## 2023-04-08 NOTE — ED PROVIDER NOTE - PHYSICAL EXAMINATION
CONSTITUTIONAL: in no apparent distress.   HEAD: Normocephalic; atraumatic.   ENT: Hearing is intact with good acuity to spoken voice.  Patient is speaking clearly, not muffled and airway is intact.   RESPIRATORY: No signs of respiratory distress. Lung sounds are clear in all lobes bilaterally without rales, rhonchi, or wheezes.  CARDIOVASCULAR: Regular rate and rhythm.   GI: tender to palpation in the L side abd pain. Abdomen is soft, and without distention. Bowel sounds are present and normoactive in all four quadrants. No masses are noted.   BACK: No evidence of trauma or deformity. No CVA tenderness bilaterally. Normal ROM.   NEURO: A & O x 3. Normal speech. No focal deficit.  PSYCHOLOGICAL: Appropriate mood and affect. Good judgement and insight.

## 2023-04-09 LAB
CULTURE RESULTS: SIGNIFICANT CHANGE UP
SPECIMEN SOURCE: SIGNIFICANT CHANGE UP

## 2023-07-06 ENCOUNTER — EMERGENCY (EMERGENCY)
Facility: HOSPITAL | Age: 43
LOS: 0 days | Discharge: OTHER INSTITUTION | End: 2023-07-07
Attending: STUDENT IN AN ORGANIZED HEALTH CARE EDUCATION/TRAINING PROGRAM
Payer: MEDICAID

## 2023-07-06 VITALS
SYSTOLIC BLOOD PRESSURE: 126 MMHG | OXYGEN SATURATION: 98 % | DIASTOLIC BLOOD PRESSURE: 91 MMHG | RESPIRATION RATE: 18 BRPM | WEIGHT: 164.91 LBS | TEMPERATURE: 98 F | HEART RATE: 105 BPM

## 2023-07-06 DIAGNOSIS — Z90.49 ACQUIRED ABSENCE OF OTHER SPECIFIED PARTS OF DIGESTIVE TRACT: Chronic | ICD-10-CM

## 2023-07-06 DIAGNOSIS — Z20.822 CONTACT WITH AND (SUSPECTED) EXPOSURE TO COVID-19: ICD-10-CM

## 2023-07-06 DIAGNOSIS — R45.851 SUICIDAL IDEATIONS: ICD-10-CM

## 2023-07-06 DIAGNOSIS — F20.9 SCHIZOPHRENIA, UNSPECIFIED: ICD-10-CM

## 2023-07-06 DIAGNOSIS — Z59.00 HOMELESSNESS UNSPECIFIED: ICD-10-CM

## 2023-07-06 PROCEDURE — 87635 SARS-COV-2 COVID-19 AMP PRB: CPT

## 2023-07-06 PROCEDURE — 99285 EMERGENCY DEPT VISIT HI MDM: CPT | Mod: 25

## 2023-07-06 PROCEDURE — 85025 COMPLETE CBC W/AUTO DIFF WBC: CPT

## 2023-07-06 PROCEDURE — 99285 EMERGENCY DEPT VISIT HI MDM: CPT

## 2023-07-06 PROCEDURE — 36415 COLL VENOUS BLD VENIPUNCTURE: CPT

## 2023-07-06 PROCEDURE — 93005 ELECTROCARDIOGRAM TRACING: CPT

## 2023-07-06 PROCEDURE — 80307 DRUG TEST PRSMV CHEM ANLYZR: CPT

## 2023-07-06 PROCEDURE — 80053 COMPREHEN METABOLIC PANEL: CPT

## 2023-07-06 SDOH — ECONOMIC STABILITY - HOUSING INSECURITY: HOMELESSNESS UNSPECIFIED: Z59.00

## 2023-07-06 NOTE — ED ADULT TRIAGE NOTE - CHIEF COMPLAINT QUOTE
PT presents to the ED c/o of suicidal ideation with visual and auditory hallucinations. PT states he sees shadows. Pt also states voices are telling him to kill himself. PT denies wanting to hurt others. Pt with h/x of psych issues. PT presents to the ED c/o of suicidal ideation with visual and auditory hallucinations. PT states he sees shadows. Pt also states voices are telling him to kill himself. PT denies wanting to hurt others. Pt with h/x of psych issues. 1:1 sit initiated

## 2023-07-06 NOTE — ED ADULT TRIAGE NOTE - ACCOMPANIED BY
EMT/paramedic Non-Graft Cartilage Fenestration Text: The cartilage was fenestrated with a 2mm punch biopsy to help facilitate healing.

## 2023-07-07 ENCOUNTER — INPATIENT (INPATIENT)
Facility: HOSPITAL | Age: 43
LOS: 3 days | Discharge: HOME | DRG: 885 | End: 2023-07-11
Attending: PSYCHIATRY & NEUROLOGY | Admitting: PSYCHIATRY & NEUROLOGY
Payer: MEDICAID

## 2023-07-07 VITALS
DIASTOLIC BLOOD PRESSURE: 70 MMHG | TEMPERATURE: 97 F | SYSTOLIC BLOOD PRESSURE: 116 MMHG | HEART RATE: 68 BPM | RESPIRATION RATE: 18 BRPM | OXYGEN SATURATION: 96 %

## 2023-07-07 VITALS
SYSTOLIC BLOOD PRESSURE: 113 MMHG | HEIGHT: 66 IN | RESPIRATION RATE: 16 BRPM | HEART RATE: 61 BPM | TEMPERATURE: 98 F | DIASTOLIC BLOOD PRESSURE: 77 MMHG | WEIGHT: 177.91 LBS | OXYGEN SATURATION: 96 %

## 2023-07-07 DIAGNOSIS — Z90.49 ACQUIRED ABSENCE OF OTHER SPECIFIED PARTS OF DIGESTIVE TRACT: Chronic | ICD-10-CM

## 2023-07-07 LAB
ALBUMIN SERPL ELPH-MCNC: 4.4 G/DL — SIGNIFICANT CHANGE UP (ref 3.5–5.2)
ALP SERPL-CCNC: 71 U/L — SIGNIFICANT CHANGE UP (ref 30–115)
ALT FLD-CCNC: 23 U/L — SIGNIFICANT CHANGE UP (ref 0–41)
ANION GAP SERPL CALC-SCNC: 12 MMOL/L — SIGNIFICANT CHANGE UP (ref 7–14)
APAP SERPL-MCNC: <5 UG/ML — LOW (ref 10–30)
AST SERPL-CCNC: 17 U/L — SIGNIFICANT CHANGE UP (ref 0–41)
BASOPHILS # BLD AUTO: 0.04 K/UL — SIGNIFICANT CHANGE UP (ref 0–0.2)
BASOPHILS NFR BLD AUTO: 0.6 % — SIGNIFICANT CHANGE UP (ref 0–1)
BILIRUB SERPL-MCNC: 0.9 MG/DL — SIGNIFICANT CHANGE UP (ref 0.2–1.2)
BUN SERPL-MCNC: 16 MG/DL — SIGNIFICANT CHANGE UP (ref 10–20)
CALCIUM SERPL-MCNC: 9.7 MG/DL — SIGNIFICANT CHANGE UP (ref 8.4–10.5)
CHLORIDE SERPL-SCNC: 102 MMOL/L — SIGNIFICANT CHANGE UP (ref 98–110)
CO2 SERPL-SCNC: 24 MMOL/L — SIGNIFICANT CHANGE UP (ref 17–32)
CREAT SERPL-MCNC: 1.1 MG/DL — SIGNIFICANT CHANGE UP (ref 0.7–1.5)
EGFR: 87 ML/MIN/1.73M2 — SIGNIFICANT CHANGE UP
EOSINOPHIL # BLD AUTO: 0.2 K/UL — SIGNIFICANT CHANGE UP (ref 0–0.7)
EOSINOPHIL NFR BLD AUTO: 3 % — SIGNIFICANT CHANGE UP (ref 0–8)
ETHANOL SERPL-MCNC: <10 MG/DL — SIGNIFICANT CHANGE UP
GLUCOSE SERPL-MCNC: 147 MG/DL — HIGH (ref 70–99)
HCT VFR BLD CALC: 44.9 % — SIGNIFICANT CHANGE UP (ref 42–52)
HGB BLD-MCNC: 15.2 G/DL — SIGNIFICANT CHANGE UP (ref 14–18)
IMM GRANULOCYTES NFR BLD AUTO: 0.7 % — HIGH (ref 0.1–0.3)
LYMPHOCYTES # BLD AUTO: 0.69 K/UL — LOW (ref 1.2–3.4)
LYMPHOCYTES # BLD AUTO: 10.3 % — LOW (ref 20.5–51.1)
MCHC RBC-ENTMCNC: 26.8 PG — LOW (ref 27–31)
MCHC RBC-ENTMCNC: 33.9 G/DL — SIGNIFICANT CHANGE UP (ref 32–37)
MCV RBC AUTO: 79.2 FL — LOW (ref 80–94)
MONOCYTES # BLD AUTO: 0.87 K/UL — HIGH (ref 0.1–0.6)
MONOCYTES NFR BLD AUTO: 12.9 % — HIGH (ref 1.7–9.3)
NEUTROPHILS # BLD AUTO: 4.87 K/UL — SIGNIFICANT CHANGE UP (ref 1.4–6.5)
NEUTROPHILS NFR BLD AUTO: 72.5 % — SIGNIFICANT CHANGE UP (ref 42.2–75.2)
NRBC # BLD: 0 /100 WBCS — SIGNIFICANT CHANGE UP (ref 0–0)
PLATELET # BLD AUTO: 183 K/UL — SIGNIFICANT CHANGE UP (ref 130–400)
PMV BLD: 9.4 FL — SIGNIFICANT CHANGE UP (ref 7.4–10.4)
POTASSIUM SERPL-MCNC: 4.3 MMOL/L — SIGNIFICANT CHANGE UP (ref 3.5–5)
POTASSIUM SERPL-SCNC: 4.3 MMOL/L — SIGNIFICANT CHANGE UP (ref 3.5–5)
PROT SERPL-MCNC: 6.3 G/DL — SIGNIFICANT CHANGE UP (ref 6–8)
RBC # BLD: 5.67 M/UL — SIGNIFICANT CHANGE UP (ref 4.7–6.1)
RBC # FLD: 12.9 % — SIGNIFICANT CHANGE UP (ref 11.5–14.5)
SALICYLATES SERPL-MCNC: <0.3 MG/DL — LOW (ref 4–30)
SARS-COV-2 RNA SPEC QL NAA+PROBE: SIGNIFICANT CHANGE UP
SODIUM SERPL-SCNC: 138 MMOL/L — SIGNIFICANT CHANGE UP (ref 135–146)
WBC # BLD: 6.72 K/UL — SIGNIFICANT CHANGE UP (ref 4.8–10.8)
WBC # FLD AUTO: 6.72 K/UL — SIGNIFICANT CHANGE UP (ref 4.8–10.8)

## 2023-07-07 PROCEDURE — 93010 ELECTROCARDIOGRAM REPORT: CPT

## 2023-07-07 PROCEDURE — 90792 PSYCH DIAG EVAL W/MED SRVCS: CPT | Mod: 95

## 2023-07-07 RX ORDER — DIVALPROEX SODIUM 500 MG/1
500 TABLET, DELAYED RELEASE ORAL DAILY
Refills: 0 | Status: DISCONTINUED | OUTPATIENT
Start: 2023-07-08 | End: 2023-07-07

## 2023-07-07 RX ORDER — DIVALPROEX SODIUM 500 MG/1
500 TABLET, DELAYED RELEASE ORAL DAILY
Refills: 0 | Status: DISCONTINUED | OUTPATIENT
Start: 2023-07-08 | End: 2023-07-09

## 2023-07-07 RX ORDER — POLYETHYLENE GLYCOL 3350 17 G/17G
17 POWDER, FOR SOLUTION ORAL AT BEDTIME
Refills: 0 | Status: DISCONTINUED | OUTPATIENT
Start: 2023-07-07 | End: 2023-07-11

## 2023-07-07 RX ORDER — HALOPERIDOL DECANOATE 100 MG/ML
10 INJECTION INTRAMUSCULAR DAILY
Refills: 0 | Status: DISCONTINUED | OUTPATIENT
Start: 2023-07-08 | End: 2023-07-11

## 2023-07-07 RX ORDER — HALOPERIDOL DECANOATE 100 MG/ML
5 INJECTION INTRAMUSCULAR EVERY 6 HOURS
Refills: 0 | Status: DISCONTINUED | OUTPATIENT
Start: 2023-07-07 | End: 2023-07-11

## 2023-07-07 RX ORDER — DIPHENHYDRAMINE HCL 50 MG
50 CAPSULE ORAL EVERY 6 HOURS
Refills: 0 | Status: DISCONTINUED | OUTPATIENT
Start: 2023-07-07 | End: 2023-07-11

## 2023-07-07 RX ORDER — IBUPROFEN 200 MG
400 TABLET ORAL EVERY 6 HOURS
Refills: 0 | Status: DISCONTINUED | OUTPATIENT
Start: 2023-07-07 | End: 2023-07-11

## 2023-07-07 NOTE — ED BEHAVIORAL HEALTH ASSESSMENT NOTE - SUMMARY
42M, single, living in shelter, denies PMH, psych hx of schizoaffective, substance induced psychosis, PSA (MJ, etoh, ccn), PTSD, inconsistent report of prior suicide attempt (denies today and only endorses past suicide ideation, per chart had reported prior suicide attempts), prior arrests for drug related charges, now BIBS reporting suicide ideation and CAHK to kill self 42M, single, living in shelter, denies PMH, psych hx of schizoaffective, substance induced psychosis, PSA (MJ, etoh, ccn), PTSD, inconsistent report of prior suicide attempt (denies today and only endorses past suicide ideation, per chart had reported prior suicide attempts), prior arrests for drug related charges, now BIBS reporting suicide ideation and CAHK to kill self    On exam patient displays speech latency, possible internal preoccupation, reporting both suicide ideation to hang self and CAHKS. Cannot rule out substance use at this time, nor can we rule out malingering, but patient has not been in our system since 5/2022 and there is no collateral. Patient is requesting admission  and would likely benefit from safety, stabilization, and medication titration. He is currently appropriate for voluntary admission given risk factors decribed below

## 2023-07-07 NOTE — BH PATIENT PROFILE - FALL HARM RISK - ATTEMPT OOB
[facility]  Internal Medicine Teaching Residency Program  Inpatient Daily Progress Note  ______________________________________________________________________________    Patient: Adalgisa Parker  YOB: 1966   PDP:[id number]    Acct: [de-identified]     Room: [address] [place] [address]  Admit date: 1/28/2022  Today's date: 02/01/22  Number of days in the hospital: 3    SUBJECTIVE   Admitting Diagnosis: Primary hypertension  CC: Abdominal pain    Pt examined at bedside. Chart & results reviewed. Afebrile  Saturating on room air  Alert and oriented  Vitals stable. On Coreg, Lisinopril, Norvasc. BP well controlled. IV Venofer for replacement of iron deficiency. Vitamin D <5, oral vitamin D replacement ordered. Patient denies any black tarry stools, hematochezia. Follows up with Dr. Wilman Newman for colonoscopy every few years as per patient. Has family history of colon cancer. Patient counseled extensively regarding need for follow-up with PCP and GI for multiple nutritional deficiencies, possible need for colonoscopy for further exploration of iron deficiency anemia. ROS:  Constitutional:  negative for chills, fevers, sweats  Respiratory:  negative for cough, dyspnea on exertion, hemoptysis, shortness of breath, wheezing  Cardiovascular:  negative for chest pain, chest pressure/discomfort, lower extremity edema, palpitations  Gastrointestinal: Positive for abdominal pain, constipation, negative for diarrhea, nausea, vomiting  Neurological:  negative for dizziness, headache  BRIEF HISTORY     73-year-old female presented with acute abdomen, found internal hernia. Underwent exploratory laparotomy, postop day 2. Internal medicine is consulted for medical management. Has past history of hyperlipidemia, hypertension, polysubstance abuse, COPD/asthma, major depression/bipolar/fibromyalgia and seizure disorder. U tox positive for amphetamine and cocaine. WBC 17.8.   We  polyethylene glycol  17 g Oral Daily    ARIPiprazole  5 mg Oral Daily    buPROPion  150 mg Oral QAM    DULoxetine  60 mg Oral Daily    topiramate  100 mg Oral BID    amLODIPine  10 mg Oral Daily     Continuous Infusions:    sodium chloride       PRN MedicationsoxyCODONE, 5 mg, Q12H PRN  sodium chloride flush, 10 mL, PRN  sodium chloride, 25 mL, PRN  ondansetron, 4 mg, Q8H PRN   Or  ondansetron, 4 mg, Q6H PRN  potassium chloride, 10 mEq, PRN  magnesium sulfate, 2,000 mg, PRN  senna, 1 tablet, Daily PRN  traZODone, 50 mg, Nightly PRN  [Held by provider] hydrALAZINE, 10 mg, Q4H PRN  ipratropium-albuterol, 1 ampule, Q4H PRN        Diagnostic Labs:  CBC:   Recent Labs     01/30/22  0652   WBC 8.9   RBC 4.53   HGB 10.4*   HCT 35.0*   MCV 77.3*   RDW 18.1*        BMP:   Recent Labs     01/30/22  0652 01/31/22  0848    137   K 3.3* 4.2   * 109*   CO2 19* 17*   BUN 7 10   CREATININE 0.47* 0.52     BNP: No results for input(s): BNP in the last 72 hours. PT/INR: No results for input(s): PROTIME, INR in the last 72 hours. APTT: No results for input(s): APTT in the last 72 hours. CARDIAC ENZYMES: No results for input(s): CKMB, CKMBINDEX, TROPONINI in the last 72 hours. Invalid input(s): CKTOTAL;3  FASTING LIPID PANEL:  Lab Results   Component Value Date    CHOL 156 01/30/2022    HDL 39 (L) 01/30/2022    TRIG 78 01/30/2022     LIVER PROFILE:   No results for input(s): AST, ALT, ALB, BILIDIR, BILITOT, ALKPHOS in the last 72 hours. MICROBIOLOGY:   Lab Results   Component Value Date/Time    CULTURE NO SIGNIFICANT GROWTH 12/26/2018 07:03 PM       Imaging:    CT ABDOMEN PELVIS W IV CONTRAST Additional Contrast? None    Result Date: 1/28/2022  Mesenteric congestion with findings suspicious for displaced bowel loops within the right lower quadrant. Slight twisting of mesenteric vessels identified as well. .  Findings worrisome for internal hernia. Surgical consultation recommended.        ASSESSMENT & PLAN   Principal Problem:    Primary hypertension  Active Problems:    GERD (gastroesophageal reflux disease)    Depression with anxiety    Hyperlipidemia    Internal hernia  Resolved Problems:    * No resolved hospital problems. *    1. Internal hernia. S/p exploratory laparotomy. POD 4. General surgery following  2. Hypertension. Continue on amlodipine 10 daily, Coreg 25 mg twice daily, lisinopril 20 mg daily. Monitor blood pressure  3. Seizure disorder. Continue on Topamax 100 twice daily  4. Migraine. Continue on Topamax  5. Depression/ fibromyalgia/bipolar disorder. Continue on Abilify, bupropion, Cymbalta, trazodone. 6. COPD/asthma. Currently not in exacerbation. Continue on DuoNeb nebulization. Monitor SPO2  7. Iron deficiency anemia: IV Venofer 200 mg daily for 3 days  8. Vitamin D deficiency: Oral vitamin D replacement ordered. GI prophylaxis: none  DVT prophylaxis: [facility]     Medicine team to sign off. Please call or page with any questions or concerns. Alice Austin MD  Internal Medicine Resident, PGY-2  [address], [place]  2/1/2022, 2:41 PM  Attending Physician Statement  I have discussed the care of Giovanni Luek and I have examined the patient myselft and taken ros and hpi , including pertinent history and exam findings,  with the resident. I have reviewed the key elements of all parts of the encounter with the resident. I agree with the assessment, plan and orders as documented by the resident.       Electronically signed by Balaji Walsh MD No

## 2023-07-07 NOTE — ED PROVIDER NOTE - OBJECTIVE STATEMENT
41yo male PMHx schizophrenia coming in with complaints of SI, stating that he is having recurring thoughts of wanting to hang himself with a belt and is afraid that he will act on these thoughts. +AH encouraging him to harm himself. +VH described as seeing shadow. No HI. Pt denies etoh or illicit drug use. Pt previously admitted for similar complaints of [facility]. No somatic complaints.

## 2023-07-07 NOTE — ED ADULT NURSE NOTE - CHIEF COMPLAINT QUOTE
PT presents to the ED c/o of suicidal ideation with visual and auditory hallucinations. PT states he sees shadows. Pt also states voices are telling him to kill himself. PT denies wanting to hurt others. Pt with h/x of psych issues. 1:1 sit initiated

## 2023-07-07 NOTE — ED BEHAVIORAL HEALTH NOTE - BEHAVIORAL HEALTH NOTE
==================  PRE-HOSPITAL COURSE  ===================  SOURCE:  MD Gonzalez and secondhand ED documentation  DETAILS: BIB EMS for CAH, SI plan to hang self with belt, and VH  =========  ED COURSE  =========  SOURCE:  MD and secondhand ED documentation.  ARRIVAL:  Per chart and MD, patient arrived via EMS. Per MD, patient was calm upon arrival, and cooperative with triage process.  BELONGINGS: Per chart, patient arrived with belongings. All belongings were provided to hospital security, and patient currently in a gown with a 1:1 staff member.  BEHAVIOR: MD described patient to be calm and cooperative, presenting with linear thought process, AAOx3, presenting with normal mood and flat affect, remains in behavioral and impulse control, is not violent/aggressive. MD stated that the patient is endorsing SI/AH/VH. MD stated that there are no visible marks, bruises, or lacerations on the body. MD stated that the patient appears to be unkempt, maintains poor hygiene.  TREATMENT:  Per chart and MD, patient did not receive PRN medications.   VISITORS:  Per MD, no visitors at bedside.          COVID Exposure Screen- collateral (i.e. third-party, chart review, belongings, etc; include EMS and ED staff)   ---------------------------------------------------  1. Has the patient had a COVID-19 test in the last 90 days? Unknown.   2. Has the patient tested positive for COVID-19 antibodies? Unknown.   3. Has the patient received 2 doses of the COVID-19 vaccine? Unknown  4. In the past 10 days, has the patient been around anyone with a positive COVID-19 test?* Unknown.   5. Has the patient been out of [place] within the past 10 days? Unknown

## 2023-07-07 NOTE — ED PROVIDER NOTE - ATTENDING CONTRIBUTION TO CARE
I personally evaluated the patient. I reviewed the Resident’s or Physician Assistant’s note (as assigned above), and agree with the findings and plan except as documented in my note.  42-year-old male with past medical history of schizophrenia, undomiciled presents with complaints of SI for the past several days.  Patient states that he has been hearing voices telling him to kill himself, specifically telling him to hang himself.  Denies having attempted this recently but has done in the distant past.  Denies any HI.  Also reports auditory and visual hallucinations and states that she sees shadows.  Has no medical complaints currently.  VSS, non toxic appearing, NAD, Head NCAT, MMM, neck supple, normal ROM, normal s1s2, lungs ctab, abd s/nt/nd, no guarding or rebound, extremities wnl, AAO x 3, GCS 15, neuro grossly normal. No acute skin lesions. Plan is medical clearance and psychiatric evaluation.

## 2023-07-07 NOTE — ED ADULT NURSE NOTE - NSFALLUNIVINTERV_ED_ALL_ED
Bed/Stretcher in lowest position, wheels locked, appropriate side rails in place/Call bell, personal items and telephone in reach/Instruct patient to call for assistance before getting out of bed/chair/stretcher/Non-slip footwear applied when patient is off stretcher/[place] to call system/Physically safe environment - no spills, clutter or unnecessary equipment/Purposeful proactive rounding/Room/bathroom lighting operational, light cord in reach

## 2023-07-07 NOTE — ED BEHAVIORAL HEALTH ASSESSMENT NOTE - RISK ASSESSMENT
risk factors: male, single, limited social supports, s/p SA, hx SA, ongoing CAH and SI, hx substance use, psych dx, prior admissions    protective factors: help seeking, denies access to guns risk factors: male, single, limited social supports, suicide ideation, ongoing CAH, hx substance use, mood/psychotic disorders, prior admissions    protective factors: help seeking, denies access to guns

## 2023-07-07 NOTE — ED BEHAVIORAL HEALTH ASSESSMENT NOTE - OTHER PAST PSYCHIATRIC HISTORY (INCLUDE DETAILS REGARDING ONSET, COURSE OF ILLNESS, INPATIENT/OUTPATIENT TREATMENT)
per PSYCKES:  Schizophrenia | Schizoaffective Disorder | Unspecified/Other Depressive Disorder | Unspecified/Other Anxiety Disorder | Major Depressive Disorder | Alcohol related disorders | Adjustment Disorder | Unspecified/Other Psychotic Disorders | Brief Psychotic Disorder (ICD10 Only) | Cannabis related disorders | Other psychoactive substance related disorders | Paranoid Personality Disorder | Selective Mutism | Cocaine related disorders | Delusional Disorder | Generalized Anxiety Disorder | Other Mental Disorders | Substance-Induced Psychotic Disorder | Unspecified/Other Personality Disorder    past admissions at [facility], [facility], [facility], [facility], [facility]

## 2023-07-07 NOTE — BH CHART NOTE - NSEVENTNOTEFT_PSY_ALL_CORE
Lauren Reeder  MRN: [id number]  : 1980    ACCEPTANCE NOTE    HPI:  42 year old male, single, domiciled in shelter, unemployed with no significant PMH and PPH schizoaffective, substance induced psychosis, PSA (MJ, etoh, ccn), PTSD, inconsistent report of prior suicide attempt (denies today and only endorses past suicide ideation, per chart had reported prior suicide attempts), prior arrests for drug related charges, now BIBS reporting suicide ideation and CAH to kill himself.    On evaluation, the patient is calm, cooperative, oddly related with good eye contact, euthymic affect and demonstrating good behavioral control and linear thought processes.  On approach, the patient is making himself a sandwich.  The patient denies current SI/HI, intent, plan and AVH.  He contracts for safety on the unit.  He denies any allergies.  The patient agrees to continue home haloperidol and Depakote.    Vital Signs Last 24 Hrs  T(C): 36.8 (2023 22:44), Max: 36.8 (2023 22:44)  T(F): 98.2 (2023 22:44), Max: 98.2 (2023 22:44)  HR: 61 (2023 22:44) (61 - 61)  BP: 113/77 (2023 22:44) (113/77 - 113/77)  BP(mean): --  RR: 16 (2023 22:44) (16 - 16)  SpO2: 96% (2023 22:44) (96% - 96%)    Mental Status Exam:  Appearance: middle-aged male, appears stated age, adequate hygiene/grooming  Behavior: calm, cooperative, oddly-related, good eye contact, no PMA/PMR noted, no tremor, no abnormal movements, steady gait  Speech: normal volume, rate and tone   Mood: " Good. "  Affect: euthymic, full range, appropriate, stable, and reactive  Thought Process: linear and goal directed without loosening of associations, tangential thought, thought blocking or illogical thought  Thought Content: Denies SI/HI/thoughts of harming self or others, impulse control WNL, no paranoia or delusions reported or elicited, patient does not appear internally preoccupied over the course of the interview  Perception: Patient denies auditory and visual hallucinations, illusions  Cognition: Oriented to person, place and time, attn/conc/recent and remote memory/fund of knowledge WNL  Insight: Fair  Judgement: Fair    Assessment & Plan:  42 year old male, single, domiciled in shelter, unemployed with no significant PMH and PPH schizoaffective, substance induced psychosis, PSA (MJ, etoh, ccn), PTSD, inconsistent report of prior suicide attempt (denies today and only endorses past suicide ideation, per chart had reported prior suicide attempts), prior arrests for drug related charges, now BIBS reporting suicide ideation and CAH to kill himself.    On evaluation, the patient is calm, cooperative, oddly related with good eye contact, euthymic affect and demonstrating good behavioral control and linear thought processes.  Per chart review, on initial exam, the patient demonstrated speech latency indicative or potential internal preoccupation and he reported but SI and CAH instructing him to kill himself.  The patient's presentation is consistent with schizoaffective disorder decompensation vs SIP.  No collateral information was obtained on initial evaluation.  The patient requested a voluntary psychiatric admission for safety, psychiatric stabilization, medication optimization and establishment of outpatient follow-up.      Plan:  - Voluntary Admission to [facility]  - continue home haloperidol (Haldol) 10 mg PO daily and Depakote  mg PO daily in the morning  - follow-up VPA level in the AM  - PRNs:  haloperidol (Haldol) 5 mg PO/IM, Lorazepam (Ativan) 2 mg PO/IM and Diphenhydramine (Benadryl) 50 mg PO/IM q6h PRN for agitation; hold for atc >500 ms    Rashad Ray MD  Department of Psychiatry

## 2023-07-07 NOTE — ED BEHAVIORAL HEALTH ASSESSMENT NOTE - NSPRESENTSXS_PSY_ALL_CORE
Depressed mood/Anhedonia/Psychosis/Global insomnia/Command hallucinations to hurt self/Refusal or inability to complete safety plan

## 2023-07-07 NOTE — ED PROVIDER NOTE - CLINICAL SUMMARY MEDICAL DECISION MAKING FREE TEXT BOX
.    42-year-old female, PMH of hernia, presents with suicidal ideation, no acute ED events.  Patient evaluated by telepsychiatry, recommend admission. + Voluntary admission.  No acute events.  Patient admitted to psychiatry at [facility].    .

## 2023-07-07 NOTE — ED BEHAVIORAL HEALTH ASSESSMENT NOTE - DIFFERENTIAL
depression, schizophrenia, substance-induced mood/psychotic disorder, anxiety, personality disorder schizoaffective disorder, r/o depression, schizophrenia, substance-induced mood/psychotic disorder, anxiety, substance intoxication,
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
## 2023-07-07 NOTE — ED BEHAVIORAL HEALTH ASSESSMENT NOTE - PSYCHIATRIC ISSUES AND PLAN (INCLUDE STANDING AND PRN MEDICATION)
Continue haldol 10mg PO qAM and depakote 500mg PO qAM. For agitation, haldol 5mg with ativan 2mg and benadryl 50mg PO or IM if severe

## 2023-07-07 NOTE — ED PROVIDER NOTE - PROGRESS NOTE DETAILS
[place]: Patient was evaluated by the psych team.  They recommended voluntary admission. SH  Pt calm and cooperative   admits to   self admission for psychiatric evaluation to [facility]

## 2023-07-07 NOTE — ED BEHAVIORAL HEALTH ASSESSMENT NOTE - HPI (INCLUDE ILLNESS QUALITY, SEVERITY, DURATION, TIMING, CONTEXT, MODIFYING FACTORS, ASSOCIATED SIGNS AND SYMPTOMS)
42M, single, living in shelter, denies PMH, psych hx of schizoaffective, substance induced psychosis, PSA (MJ, etoh, ccn), PTSD, inconsistent report of prior suicide attempt (denies today and only endorses past suicide ideation, per chart had reported prior suicide attempts), prior arrests for drug related charges, now BIBS reporting suicide ideation and CAHK to kill self    Patient was noted to display speech latency during interview and reported that he was hearing voices and found it difficult to concentrate.     He states that he has had suicide ideation with thoughts of hanging himself for the last year, on and off, that have been getting worse recently. He denies taking preparatory action or knowing where he would hang himself, but he states that he both hears a male voice that says "kill yourself" and wants to kill himself because of the voice. He denies any precipitating factors, reports adherence to his haldol and depakote, states that he was at [facility] 1-2 months ago and they stopped his zyprexa. He denies alcohol and drug use, denies HI. When asked if he felt anyone was out to get him he said yes and when asked to clarify he said "the voices," later said that he was not sure. He states that he gets his treatment at [facility], when asked why he did not go there he said that he was coming from his mothers house, but that he did not know her number. He said that he did not feel safe going home and wanted admission as he was afraid he would hang himself.     No collateral contact information available

## 2023-07-07 NOTE — ED PROVIDER NOTE - PHYSICAL EXAMINATION
VITAL SIGNS: I have reviewed nursing notes and confirm.  CONSTITUTIONAL: Well-appearing, non-toxic, in NAD  SKIN: Warm dry, normal skin turgor  HEAD: NCAT  EYES: No conjunctival injection, scleral anicteric  ENT: MMM  NECK: Supple; FROM.   CARD: RRR  RESP: CTAB  ABD: Soft, NDNT  EXT: No pedal edema, no calf tenderness  NEURO: Grossly normal examination, CN grossly intact      Attitude: cooperative, easily engaged in conversation  Appearance: in NAD  Hygiene and grooming: fair  Affect: restricted range of emotional expression  Speech: normal rate, volume, and articulation  Thought process: logical and linear  Insight: understands current condition  Mood: "I hear voicing telling me to hurt myself"  Hallucinations: +VAH  Suicidal and violent ideation: +SI, with plan ("hang myself"). Denies HI  Cognitive exam: Patient is orientated, with normal memory and attention.

## 2023-07-08 DIAGNOSIS — F41.9 ANXIETY DISORDER, UNSPECIFIED: ICD-10-CM

## 2023-07-08 LAB — VALPROATE SERPL-MCNC: 6.6 UG/ML — LOW (ref 50–100)

## 2023-07-08 PROCEDURE — 90792 PSYCH DIAG EVAL W/MED SRVCS: CPT

## 2023-07-08 RX ORDER — BNT162B2 ORIGINAL AND OMICRON BA.4/BA.5 .1125; .1125 MG/2.25ML; MG/2.25ML
0.3 INJECTION, SUSPENSION INTRAMUSCULAR ONCE
Refills: 0 | Status: DISCONTINUED | OUTPATIENT
Start: 2023-07-08 | End: 2023-07-08

## 2023-07-08 RX ADMIN — HALOPERIDOL DECANOATE 10 MILLIGRAM(S): 100 INJECTION INTRAMUSCULAR at 09:47

## 2023-07-08 RX ADMIN — DIVALPROEX SODIUM 500 MILLIGRAM(S): 500 TABLET, DELAYED RELEASE ORAL at 09:47

## 2023-07-08 NOTE — BH INPATIENT PSYCHIATRY ASSESSMENT NOTE - DESCRIPTION
Born on [place] and raised by Maternal uncle.  He reports that he has no siblings.  He states that he was living at the [facility] for the past three months.  He reports completing an 11th grade education.  He supports himself with SSI.  He reports that he has one 18 year old son with whom he has no contact.  He identifies as Amish.

## 2023-07-08 NOTE — BH INPATIENT PSYCHIATRY ASSESSMENT NOTE - NSBHMETABOLIC_PSY_ALL_CORE_FT
BMI: BMI (kg/m2): 28.7 (07-07-23 @ 22:44)  HbA1c:   Glucose:   BP: 113/77 (07-07-23 @ 22:44) (113/77 - 113/77)  Lipid Panel:  BMI: BMI (kg/m2): 28.7 (07-07-23 @ 22:44)  HbA1c:   Glucose:   BP: 114/77 (07-08-23 @ 16:23) (113/77 - 117/74)  Lipid Panel:

## 2023-07-08 NOTE — BH INPATIENT PSYCHIATRY ASSESSMENT NOTE - HPI (INCLUDE ILLNESS QUALITY, SEVERITY, DURATION, TIMING, CONTEXT, MODIFYING FACTORS, ASSOCIATED SIGNS AND SYMPTOMS)
42 year old male, single, domiciled in shelter, unemployed with no significant PMH and PPH schizoaffective, substance induced psychosis, PSA (MJ, etoh, ccn), PTSD, inconsistent report of prior suicide attempt (denies today and only endorses past suicide ideation, per chart had reported prior suicide attempts), prior arrests for drug related charges, now BIBS reporting suicide ideation and CAH to kill himself.    On evaluation, the patient is calm, cooperative, oddly related with good eye contact, euthymic affect and demonstrating good behavioral control and linear thought processes.  On approach, the patient is making himself a sandwich.  The patient denies current SI/HI, intent, plan and AVH.  He contracts for safety on the unit.  He denies any allergies.  The patient agrees to continue home haloperidol and Depakote.   42 year old male, single, domiciled in shelter, unemployed with no significant PMH and PPH schizoaffective disorder, substance induced psychosis, PSA (MJ, etoh, ccn), PTSD, inconsistent report of prior suicide attempt (denies today and only endorses past suicide ideation, per chart had reported prior suicide attempts), prior arrests for drug related charges, now BIBS reporting suicide ideation and CAH to kill himself.    On evaluation, the patient is calm, cooperative, oddly related with good eye contact, euthymic affect and demonstrating good behavioral control and linear thought processes.  The patient is primarily concerned with obtaining a new ID, SSC and establishing supportive housing.  The patient states that he was informed that if he was admitted that social work could get him his own apartment.  Expectations are established and the patient expresses disappointment.  The patient denies current SI/HI, intent, plan but states that he continues to experience CAH telling him to hang himself.  The patient does not appear internally preoccupied during the interview.  The patient states that he has been experiencing daily low mood given that he is in a shelter in which he does not feel safe.  The patient states that he cannot go back to his shelter and then inquires about a State Hospitalization.  When asked about depressive symptoms, thep atient only states low mood but denies anhedonia, feelings of guilt, insomnia, decreased energy, poor attention/concentration and changes in appetite.  He denies any symptoms consistent with elijah including elevated/irritable mood, increase in goal directed activity, decreased need for sleep, pressured speech, flight of ideas and grandiosity.  The patient states that he has no preferences regarding medications and perseverates on acquiring housing the necessary pieces of personal identification to  his SSD.  He contracts for safety on the unit.  He denies any allergies.  The patient agrees to continue home haloperidol and Depakote, the latter of which is subtherapeutic.  All questions and concerns addressed.

## 2023-07-08 NOTE — BH INPATIENT PSYCHIATRY ASSESSMENT NOTE - NSBHCHARTREVIEWLAB_PSY_A_CORE FT
VPA Level (07/08/23): 6.6 (subtherapeutic) CBC:            15.2   6.72  )-----------( 183      ( 07-07-23 @ 01:05 )             44.9     Chem:         ( 07-07-23 @ 01:05 )  138  |  102  |  16  ----------------------------<  147<H>  4.3   |  24  |  1.1    Liver Functions: ( 07-07-23 @ 01:05 )  Alb: 4.4 g/dL / Pro: 6.3 g/dL / ALK PHOS: 71 U/L / ALT: 23 U/L / AST: 17 U/L / GGT: x          BMI:   HbA1c:   Glucose:   BP: 116/70 (07-07-23 @ 19:48) (113/68 - 126/91)  Lipid Panel:  Alcohol, Blood (07/07/23): <10  Salicylate Level, Serum (07/07/23): <0.3  Acetaminophen Level, Serum (07/07/23): <5.0    COVID-19 PCR: NotDetec (07 Jul 2023 01:05)    VPA Level (07/08/23): 6.6 (subtherapeutic)

## 2023-07-08 NOTE — BH INPATIENT PSYCHIATRY ASSESSMENT NOTE - NSBHASSESSSUMMFT_PSY_ALL_CORE
42 year old male, single, domiciled in shelter, unemployed with no significant PMH and PPH schizoaffective, substance induced psychosis, PSA (MJ, etoh, ccn), PTSD, inconsistent report of prior suicide attempt (denies today and only endorses past suicide ideation, per chart had reported prior suicide attempts), prior arrests for drug related charges, now BIBS reporting suicide ideation and CAH to kill himself.    On evaluation, the patient is calm, cooperative, oddly related with good eye contact, euthymic affect and demonstrating good behavioral control and linear thought processes.  Per chart review, on initial exam, the patient demonstrated speech latency indicative or potential internal preoccupation and he reported but SI and CAH instructing him to kill himself.  The patient's presentation is consistent with schizoaffective disorder decompensation vs SIP.  No collateral information was obtained on initial evaluation.  The patient requested a voluntary psychiatric admission for safety, psychiatric stabilization, medication optimization and establishment of outpatient follow-up.      Plan:  - Voluntary Admission to [facility]  - continue home haloperidol (Haldol) 10 mg PO daily and Depakote  mg PO daily in the morning  - follow-up VPA level in the AM  - PRNs:  haloperidol (Haldol) 5 mg PO/IM, Lorazepam (Ativan) 2 mg PO/IM and Diphenhydramine (Benadryl) 50 mg PO/IM q6h PRN for agitation; hold for atc >500 ms   42 year old male, single, domiciled in shelter, unemployed with no significant PMH and PPH schizoaffective disorder, substance induced psychosis, PSA (MJ, etoh, ccn), PTSD, inconsistent report of prior suicide attempt (denies today and only endorses past suicide ideation, per chart had reported prior suicide attempts), prior arrests for drug related charges, now BIBS reporting suicide ideation and CAH to kill himself.    On evaluation, the patient is calm, cooperative, oddly related with good eye contact, euthymic affect and demonstrating good behavioral control and linear thought processes.  Per chart review, on initial exam, the patient demonstrated speech latency indicative or potential internal preoccupation and he reported but SI and CAH instructing him to kill himself.  The patient's presentation is consistent with schizoaffective disorder decompensation vs SIP.  No collateral information was obtained on initial evaluation.  The patient requested a voluntary psychiatric admission for safety, psychiatric stabilization, medication optimization and establishment of outpatient follow-up.      Plan:  - Voluntary Admission to [facility]  - continue home haloperidol (Haldol) 10 mg PO daily and Depakote  mg PO daily in the morning  - follow-up VPA level in the AM  - PRNs:  haloperidol (Haldol) 5 mg PO/IM, Lorazepam (Ativan) 2 mg PO/IM and Diphenhydramine (Benadryl) 50 mg PO/IM q6h PRN for agitation; hold for atc >500 ms   42 year old male, single, domiciled in shelter, unemployed with no significant PMH and PPH schizoaffective disorder, substance induced psychosis, PSA (MJ, etoh, ccn), PTSD, inconsistent report of prior suicide attempt (denies today and only endorses past suicide ideation, per chart had reported prior suicide attempts), prior arrests for drug related charges, now BIBS reporting suicide ideation and CAH to kill himself.    On evaluation, the patient is calm, cooperative, oddly related with good eye contact, euthymic affect and demonstrating good behavioral control and linear thought processes.  Patient spends majority of interview focusing on getting needs met and inquiring about housing and financial support.  Per chart review, on initial exam, the patient demonstrated speech latency indicative or potential internal preoccupation and he reported but SI and CAH instructing him to kill himself.  The patient's presentation is consistent with schizoaffective disorder decompensation vs SIP.  No collateral information was obtained on initial evaluation.  The patient requested a voluntary psychiatric admission for safety, psychiatric stabilization, medication optimization and establishment of outpatient follow-up.      Plan:  - Voluntary Admission to [facility]  - continue home haloperidol (Haldol) 10 mg PO daily  - VPA level in the AM (07/07/23): 6.6 (subtherapeutic)  - discontinue home Depakote  mg PO daily in the morning given likely non-compliance  - start depakote  mg PO BID; plan to titrate to good effect.   - PRNs:  haloperidol (Haldol) 5 mg PO/IM, Lorazepam (Ativan) 2 mg PO/IM and Diphenhydramine (Benadryl) 50 mg PO/IM q6h PRN for agitation; hold for atc >500 ms

## 2023-07-08 NOTE — BH INPATIENT PSYCHIATRY ASSESSMENT NOTE - NSBHMETABOLICLABS_PSY_ALL_CORE
IUD Insertion  Date/Time: 3/9/2020 10:44 AM  Performed by: Bridger Stein MD  Authorized by: Bridger Stein MD     Consent:     Consent obtained:  Written    Consent given by:  Patient    Procedure risks and benefits discussed: yes      Patient questions answered: yes      Patient agrees, verbalizes understanding, and wants to proceed: yes      Educational handouts given: no      Instructions and paperwork completed: yes    Procedure:     Pelvic exam performed: yes      Negative GC/chlamydia test: yes      Negative urine pregnancy test: yes      Negative serum pregnancy test: no      Cervix cleaned and prepped: yes      Speculum placed in vagina: yes      Tenaculum applied to cervix: no      Uterus sounded: yes      Uterus sound depth (cm):  7    IUD inserted with no complications: yes      IUD type:  Mirena    Strings trimmed: yes    Post-procedure:     Patient tolerated procedure well: yes      Patient will follow up after next period: yes    Comments:      A/P 49yo G0 here for iud insert  1) iud insertion- mirena inserted without difficulty  Pt tolerated procedure well  Strings cut 3cm from os  F/u 4wks  All questions answered       Labs within last 12 months

## 2023-07-08 NOTE — BH INPATIENT PSYCHIATRY ASSESSMENT NOTE - NSSUICPROTFACT_PSY_ALL_CORE
Identifies reasons for living/Cultural, spiritual and/or moral attitudes against suicide/Yarsani beliefs

## 2023-07-08 NOTE — BH INPATIENT PSYCHIATRY ASSESSMENT NOTE - RISK ASSESSMENT
Chronically elevated risk given a history of schizoaffective disorder, substance induced psychosis, polysubstance use disorder, male gender, poor coping mechanisms, and trauma history which is acutely elevated given poor medication compliance, patient-reported CAH telling him to kill himself and associated SI.

## 2023-07-08 NOTE — BH INPATIENT PSYCHIATRY ASSESSMENT NOTE - NSBHCHARTREVIEWVS_PSY_A_CORE FT
Vital Signs Last 24 Hrs  T(C): 36.8 (07-07-23 @ 22:44), Max: 36.8 (07-07-23 @ 22:44)  T(F): 98.2 (07-07-23 @ 22:44), Max: 98.2 (07-07-23 @ 22:44)  HR: 61 (07-07-23 @ 22:44) (61 - 61)  BP: 113/77 (07-07-23 @ 22:44) (113/77 - 113/77)  BP(mean): --  RR: 16 (07-07-23 @ 22:44) (16 - 16)  SpO2: 96% (07-07-23 @ 22:44) (96% - 96%)     Vital Signs Last 24 Hrs  T(C): 36.7 (07-08-23 @ 16:23), Max: 36.8 (07-08-23 @ 09:18)  T(F): 98 (07-08-23 @ 16:23), Max: 98.2 (07-08-23 @ 09:18)  HR: 70 (07-08-23 @ 16:23) (70 - 73)  BP: 114/77 (07-08-23 @ 16:23) (114/77 - 117/74)  BP(mean): --  RR: 18 (07-08-23 @ 16:23) (16 - 18)  SpO2: 99% (07-08-23 @ 16:23) (96% - 99%)

## 2023-07-08 NOTE — BH INPATIENT PSYCHIATRY ASSESSMENT NOTE - LEGAL HISTORY
hx of arrest for fighting, drug charges hx of arrest for fighting, drug charges.  Reports spending 1.5 years in intermediate for drug charges.

## 2023-07-08 NOTE — BH INPATIENT PSYCHIATRY ASSESSMENT NOTE - NSBHATTESTBILLING_PSY_A_CORE
07972-Wnagtljgvbc diagnostic evaluation without medical services 50480-Cagpdielvmg diagnostic evaluation with medical services

## 2023-07-08 NOTE — BH INPATIENT PSYCHIATRY ASSESSMENT NOTE - CURRENT MEDICATION
MEDICATIONS  (STANDING):  divalproex  milliGRAM(s) Oral daily  haloperidol     Tablet 10 milliGRAM(s) Oral daily    MEDICATIONS  (PRN):  aluminum hydroxide/magnesium hydroxide/simethicone Suspension 30 milliLiter(s) Oral every 4 hours PRN Dyspepsia  diphenhydrAMINE 50 milliGRAM(s) Oral every 6 hours PRN Rash and/or Itching, EPS  haloperidol     Tablet 5 milliGRAM(s) Oral every 6 hours PRN Agitation  ibuprofen  Tablet. 400 milliGRAM(s) Oral every 6 hours PRN Mild Pain (1 - 3), Moderate Pain (4 - 6)  LORazepam     Tablet 2 milliGRAM(s) Oral every 6 hours PRN Agitation  polyethylene glycol 3350 17 Gram(s) Oral at bedtime PRN Constipation

## 2023-07-08 NOTE — BH INPATIENT PSYCHIATRY ASSESSMENT NOTE - NSCOMMENTSUICRISKFACT_PSY_ALL_CORE
Chronically elevated risk given a history of schizoaffective disorder, substance induced psychosis, polysubstance use disorder, male gender, poor coping mechanisms, and trauma history.

## 2023-07-08 NOTE — BH INPATIENT PSYCHIATRY ASSESSMENT NOTE - OTHER PAST PSYCHIATRIC HISTORY (INCLUDE DETAILS REGARDING ONSET, COURSE OF ILLNESS, INPATIENT/OUTPATIENT TREATMENT)
per PSYCKES:  Schizophrenia | Schizoaffective Disorder | Unspecified/Other Depressive Disorder | Unspecified/Other Anxiety Disorder | Major Depressive Disorder | Alcohol related disorders | Adjustment Disorder | Unspecified/Other Psychotic Disorders | Brief Psychotic Disorder (ICD10 Only) | Cannabis related disorders | Other psychoactive substance related disorders | Paranoid Personality Disorder | Selective Mutism | Cocaine related disorders | Delusional Disorder | Generalized Anxiety Disorder | Other Mental Disorders | Substance-Induced Psychotic Disorder | Unspecified/Other Personality Disorder    past admissions at [facility], [facility], [facility], [facility], [facility]

## 2023-07-08 NOTE — BH INPATIENT PSYCHIATRY ASSESSMENT NOTE - NSBHCHARTREVIEWINVESTIGATE_PSY_A_CORE FT
ECG (7/7/2023 6:30:40 AM):  Ventricular Rate 84 BPM    Atrial Rate 84 BPM    P-R Interval 158 ms    QRS Duration 84 ms    Q-T Interval 366 ms    QTC Calculation(Bazett) 432 ms    P Axis 52 degrees    R Axis -20 degrees    T Axis 9 degrees    Diagnosis Line Normal sinus rhythm  Normal ECG    Confirmed by gabriela manrique ([id number]) on 7/7/2023 6:30:40 AM

## 2023-07-08 NOTE — BH INPATIENT PSYCHIATRY ASSESSMENT NOTE - NSBHPHYSICALEXAM_PSY_ALL_CORE
Appearance & Skin: middle-aged male, in NAD, skin warm, dry, no rashes  Head & Neck; ENT: head normocephalic/atraumatic, EOMI, sclera anicteric, no conjunctival injection bilaterally, mucous membranes moist, nares patent  Chest: CTAB, no wheezes, rales, rhonchi, no increased work of breathing  Cardiac: regular rate and rhythm, normal S1, S2, no murmurs, rubs or gallops  Abdomen: soft, non-tender, non-distended  Neurological: AOx3, moving all four extremities against gravity  Extremities: no peripheral cyanosis or edema bilaterally

## 2023-07-09 PROCEDURE — 99231 SBSQ HOSP IP/OBS SF/LOW 25: CPT

## 2023-07-09 RX ORDER — DIVALPROEX SODIUM 500 MG/1
500 TABLET, DELAYED RELEASE ORAL
Refills: 0 | Status: DISCONTINUED | OUTPATIENT
Start: 2023-07-09 | End: 2023-07-11

## 2023-07-09 RX ADMIN — DIVALPROEX SODIUM 500 MILLIGRAM(S): 500 TABLET, DELAYED RELEASE ORAL at 21:21

## 2023-07-09 RX ADMIN — DIVALPROEX SODIUM 500 MILLIGRAM(S): 500 TABLET, DELAYED RELEASE ORAL at 10:04

## 2023-07-09 RX ADMIN — HALOPERIDOL DECANOATE 10 MILLIGRAM(S): 100 INJECTION INTRAMUSCULAR at 10:05

## 2023-07-09 NOTE — BH INPATIENT PSYCHIATRY PROGRESS NOTE - NSBHASSESSSUMMFT_PSY_ALL_CORE
As per chart review:   "42 year old male, single, domiciled in shelter, unemployed with no significant PMH and PPH schizoaffective disorder, substance induced psychosis, PSA (MJ, etoh, ccn), PTSD, inconsistent report of prior suicide attempt (denies today and only endorses past suicide ideation, per chart had reported prior suicide attempts), prior arrests for drug related charges, now BIBS reporting suicide ideation and CAH to kill himself."    Plan:  - Voluntary Admission; frequent checks  - haloperidol 10 mg PO daily  - continue depakote  mg PO BID; plan to titrate to good effect  - PRNs:  haloperidol (Haldol) 5 mg PO/IM, Lorazepam (Ativan) 2 mg PO/IM and Diphenhydramine (Benadryl) 50 mg PO/IM q6h PRN for agitation; hold for atc >500 ms  - f/u repeat labs   As per chart review:   "42 year old male, single, domiciled in shelter, unemployed with no significant PMH and PPH schizoaffective disorder, substance induced psychosis, PSA (MJ, etoh, ccn), PTSD, inconsistent report of prior suicide attempt (denies today and only endorses past suicide ideation, per chart had reported prior suicide attempts), prior arrests for drug related charges, now BIBS reporting suicide ideation and CAH to kill himself."    Plan:  - Voluntary Admission; frequent checks  - haloperidol 10 mg PO daily  - continue depakote  mg PO BID; uptitrate as indicated  - PRNs:  haloperidol (Haldol) 5 mg PO/IM, Lorazepam (Ativan) 2 mg PO/IM and Diphenhydramine (Benadryl) 50 mg PO/IM q6h PRN for agitation; hold for atc >500 ms  - f/u repeat labs and EKG

## 2023-07-10 LAB
A1C WITH ESTIMATED AVERAGE GLUCOSE RESULT: 5.7 % — HIGH (ref 4–5.6)
ALBUMIN SERPL ELPH-MCNC: 3.9 G/DL — SIGNIFICANT CHANGE UP (ref 3.3–5)
ALP SERPL-CCNC: 81 U/L — SIGNIFICANT CHANGE UP (ref 40–120)
ALT FLD-CCNC: 20 U/L — SIGNIFICANT CHANGE UP (ref 10–45)
ANION GAP SERPL CALC-SCNC: 9 MMOL/L — SIGNIFICANT CHANGE UP (ref 5–17)
ANISOCYTOSIS BLD QL: SLIGHT — SIGNIFICANT CHANGE UP
AST SERPL-CCNC: 14 U/L — SIGNIFICANT CHANGE UP (ref 10–40)
BASOPHILS # BLD AUTO: 0.05 K/UL — SIGNIFICANT CHANGE UP (ref 0–0.2)
BASOPHILS NFR BLD AUTO: 0.9 % — SIGNIFICANT CHANGE UP (ref 0–2)
BILIRUB SERPL-MCNC: 0.7 MG/DL — SIGNIFICANT CHANGE UP (ref 0.2–1.2)
BUN SERPL-MCNC: 16 MG/DL — SIGNIFICANT CHANGE UP (ref 7–23)
BURR CELLS BLD QL SMEAR: PRESENT — SIGNIFICANT CHANGE UP
CALCIUM SERPL-MCNC: 9.8 MG/DL — SIGNIFICANT CHANGE UP (ref 8.4–10.5)
CHLORIDE SERPL-SCNC: 106 MMOL/L — SIGNIFICANT CHANGE UP (ref 96–108)
CHOLEST SERPL-MCNC: 101 MG/DL — SIGNIFICANT CHANGE UP
CHOLEST SERPL-MCNC: 105 MG/DL — SIGNIFICANT CHANGE UP
CO2 SERPL-SCNC: 26 MMOL/L — SIGNIFICANT CHANGE UP (ref 22–31)
COVID-19 SPIKE DOMAIN AB INTERP: POSITIVE
COVID-19 SPIKE DOMAIN ANTIBODY RESULT: >250 U/ML — HIGH
CREAT SERPL-MCNC: 1.23 MG/DL — SIGNIFICANT CHANGE UP (ref 0.5–1.3)
EGFR: 76 ML/MIN/1.73M2 — SIGNIFICANT CHANGE UP
EOSINOPHIL # BLD AUTO: 0.37 K/UL — SIGNIFICANT CHANGE UP (ref 0–0.5)
EOSINOPHIL NFR BLD AUTO: 7 % — HIGH (ref 0–6)
ESTIMATED AVERAGE GLUCOSE: 117 MG/DL — HIGH (ref 68–114)
GIANT PLATELETS BLD QL SMEAR: PRESENT — SIGNIFICANT CHANGE UP
GLUCOSE SERPL-MCNC: 157 MG/DL — HIGH (ref 70–99)
HCT VFR BLD CALC: 49 % — SIGNIFICANT CHANGE UP (ref 39–50)
HDLC SERPL-MCNC: 29 MG/DL — LOW
HDLC SERPL-MCNC: 30 MG/DL — LOW
HGB BLD-MCNC: 15.9 G/DL — SIGNIFICANT CHANGE UP (ref 13–17)
LIPID PNL WITH DIRECT LDL SERPL: 47 MG/DL — SIGNIFICANT CHANGE UP
LIPID PNL WITH DIRECT LDL SERPL: 51 MG/DL — SIGNIFICANT CHANGE UP
LYMPHOCYTES # BLD AUTO: 0.55 K/UL — LOW (ref 1–3.3)
LYMPHOCYTES # BLD AUTO: 10.5 % — LOW (ref 13–44)
MACROCYTES BLD QL: SLIGHT — SIGNIFICANT CHANGE UP
MANUAL SMEAR VERIFICATION: SIGNIFICANT CHANGE UP
MCHC RBC-ENTMCNC: 26.7 PG — LOW (ref 27–34)
MCHC RBC-ENTMCNC: 32.4 GM/DL — SIGNIFICANT CHANGE UP (ref 32–36)
MCV RBC AUTO: 82.2 FL — SIGNIFICANT CHANGE UP (ref 80–100)
METAMYELOCYTES # FLD: 1.8 % — HIGH (ref 0–0)
MICROCYTES BLD QL: SLIGHT — SIGNIFICANT CHANGE UP
MONOCYTES # BLD AUTO: 0.41 K/UL — SIGNIFICANT CHANGE UP (ref 0–0.9)
MONOCYTES NFR BLD AUTO: 7.9 % — SIGNIFICANT CHANGE UP (ref 2–14)
NEUTROPHILS # BLD AUTO: 3.75 K/UL — SIGNIFICANT CHANGE UP (ref 1.8–7.4)
NEUTROPHILS NFR BLD AUTO: 71 % — SIGNIFICANT CHANGE UP (ref 43–77)
NEUTS BAND # BLD: 0.9 % — SIGNIFICANT CHANGE UP (ref 0–8)
NON HDL CHOLESTEROL: 72 MG/DL — SIGNIFICANT CHANGE UP
NON HDL CHOLESTEROL: 75 MG/DL — SIGNIFICANT CHANGE UP
OVALOCYTES BLD QL SMEAR: SLIGHT — SIGNIFICANT CHANGE UP
PLAT MORPH BLD: ABNORMAL
PLATELET # BLD AUTO: 170 K/UL — SIGNIFICANT CHANGE UP (ref 150–400)
POIKILOCYTOSIS BLD QL AUTO: SIGNIFICANT CHANGE UP
POLYCHROMASIA BLD QL SMEAR: SLIGHT — SIGNIFICANT CHANGE UP
POTASSIUM SERPL-MCNC: 4.4 MMOL/L — SIGNIFICANT CHANGE UP (ref 3.5–5.3)
POTASSIUM SERPL-SCNC: 4.4 MMOL/L — SIGNIFICANT CHANGE UP (ref 3.5–5.3)
PROT SERPL-MCNC: 6.4 G/DL — SIGNIFICANT CHANGE UP (ref 6–8.3)
RBC # BLD: 5.96 M/UL — HIGH (ref 4.2–5.8)
RBC # FLD: 12.7 % — SIGNIFICANT CHANGE UP (ref 10.3–14.5)
RBC BLD AUTO: ABNORMAL
SARS-COV-2 IGG+IGM SERPL QL IA: >250 U/ML — HIGH
SARS-COV-2 IGG+IGM SERPL QL IA: POSITIVE
SODIUM SERPL-SCNC: 141 MMOL/L — SIGNIFICANT CHANGE UP (ref 135–145)
TRIGL SERPL-MCNC: 105 MG/DL — SIGNIFICANT CHANGE UP
TRIGL SERPL-MCNC: 141 MG/DL — SIGNIFICANT CHANGE UP
WBC # BLD: 5.22 K/UL — SIGNIFICANT CHANGE UP (ref 3.8–10.5)
WBC # FLD AUTO: 5.22 K/UL — SIGNIFICANT CHANGE UP (ref 3.8–10.5)

## 2023-07-10 PROCEDURE — 99232 SBSQ HOSP IP/OBS MODERATE 35: CPT

## 2023-07-10 RX ADMIN — DIVALPROEX SODIUM 500 MILLIGRAM(S): 500 TABLET, DELAYED RELEASE ORAL at 11:37

## 2023-07-10 RX ADMIN — DIVALPROEX SODIUM 500 MILLIGRAM(S): 500 TABLET, DELAYED RELEASE ORAL at 21:37

## 2023-07-10 RX ADMIN — HALOPERIDOL DECANOATE 10 MILLIGRAM(S): 100 INJECTION INTRAMUSCULAR at 11:36

## 2023-07-10 NOTE — BH INPATIENT PSYCHIATRY PROGRESS NOTE - NSBHCONSBHPROVCNTCTNOFT_PSY_A_CORE
Patient declines to provide collateral information.
Patient declines to provide collateral information.

## 2023-07-10 NOTE — BH INPATIENT PSYCHIATRY PROGRESS NOTE - NSBHCHARTREVIEWVS_PSY_A_CORE FT
Vital Signs Last 24 Hrs  T(C): 36.4 (07-10-23 @ 08:50), Max: 36.7 (07-09-23 @ 17:40)  T(F): 97.6 (07-10-23 @ 08:50), Max: 98.1 (07-09-23 @ 17:40)  HR: 67 (07-10-23 @ 08:50) (67 - 78)  BP: 120/82 (07-10-23 @ 08:50) (120/82 - 129/80)  BP(mean): --  RR: 18 (07-10-23 @ 08:50) (16 - 18)  SpO2: 99% (07-10-23 @ 08:50) (96% - 99%)    
Vital Signs Last 24 Hrs  T(C): 36.6 (07-09-23 @ 10:45), Max: 36.7 (07-08-23 @ 16:23)  T(F): 97.8 (07-09-23 @ 10:45), Max: 98 (07-08-23 @ 16:23)  HR: 67 (07-09-23 @ 10:45) (67 - 70)  BP: 124/81 (07-09-23 @ 10:45) (114/77 - 124/81)  BP(mean): --  RR: 16 (07-09-23 @ 10:45) (16 - 18)  SpO2: 99% (07-09-23 @ 10:45) (99% - 99%)

## 2023-07-10 NOTE — BH INPATIENT PSYCHIATRY PROGRESS NOTE - NSDCCRITERIA_PSY_ALL_CORE
Improvement in psychotic symptoms, no longer a danger to self
Improvement in psychotic symptoms, no longer a danger to self

## 2023-07-10 NOTE — BH INPATIENT PSYCHIATRY PROGRESS NOTE - NSBHMETABOLIC_PSY_ALL_CORE_FT
BMI: BMI (kg/m2): 28.7 (07-07-23 @ 22:44)  HbA1c:   Glucose:   BP: 124/81 (07-09-23 @ 10:45) (113/77 - 124/81)  Lipid Panel: 
BMI: BMI (kg/m2): 28.7 (07-07-23 @ 22:44)  HbA1c: A1C with Estimated Average Glucose Result: 5.7 % (07-10-23 @ 07:10)    Glucose:   BP: 120/82 (07-10-23 @ 08:50) (113/77 - 129/80)  Lipid Panel: Date/Time: 07-10-23 @ 10:25  Cholesterol, Serum: 105  Direct LDL: --  HDL Cholesterol, Serum: 30  Total Cholesterol/HDL Ration Measurement: --  Triglycerides, Serum: 141

## 2023-07-10 NOTE — BH INPATIENT PSYCHIATRY PROGRESS NOTE - NSBHATTESTAPPBILLTIME_PSY_A_CORE
I attest my time as SUYAPA is greater than 50% of the total combined time spent on qualifying patient care activities. I have reviewed and verified the documentation.

## 2023-07-10 NOTE — BH SOCIAL WORK INITIAL PSYCHOSOCIAL EVALUATION - OTHER PAST PSYCHIATRIC HISTORY (INCLUDE DETAILS REGARDING ONSET, COURSE OF ILLNESS, INPATIENT/OUTPATIENT TREATMENT)
note in progress Pt is currently in 8 uris due to suicidal ideation. PT is currently undomiciled. He is seeking to get assistance with housing related needs.

## 2023-07-10 NOTE — BH INPATIENT PSYCHIATRY PROGRESS NOTE - NSBHATTESTBILLING_PSY_A_CORE
13802-Nbtdhwinpy OBS or IP - low complexity OR 25-34 mins
99731-Simqlqpaqu OBS or IP - moderate complexity OR 35-49 mins

## 2023-07-10 NOTE — BH INPATIENT PSYCHIATRY PROGRESS NOTE - NSBHASSESSSUMMFT_PSY_ALL_CORE
As per chart review:   "42 year old male, single, domiciled in shelter, unemployed with no significant PMH and PPH schizoaffective disorder, substance induced psychosis, PSA (MJ, etoh, ccn), PTSD, inconsistent report of prior suicide attempt (denies today and only endorses past suicide ideation, per chart had reported prior suicide attempts), prior arrests for drug related charges, now BIBS reporting suicide ideation and CAH to kill himself."    Plan:  - Voluntary Admission; frequent checks  - haloperidol 10 mg PO daily  - continue depakote  mg PO BID; uptitrate as indicated  - PRNs:  haloperidol (Haldol) 5 mg PO/IM, Lorazepam (Ativan) 2 mg PO/IM and Diphenhydramine (Benadryl) 50 mg PO/IM q6h PRN for agitation; hold for atc >500 ms  - f/u repeat labs and EKG

## 2023-07-10 NOTE — BH INPATIENT PSYCHIATRY PROGRESS NOTE - CURRENT MEDICATION
MEDICATIONS  (STANDING):  divalproex  milliGRAM(s) Oral two times a day  haloperidol     Tablet 10 milliGRAM(s) Oral daily    MEDICATIONS  (PRN):  aluminum hydroxide/magnesium hydroxide/simethicone Suspension 30 milliLiter(s) Oral every 4 hours PRN Dyspepsia  diphenhydrAMINE 50 milliGRAM(s) Oral every 6 hours PRN Rash and/or Itching, EPS  haloperidol     Tablet 5 milliGRAM(s) Oral every 6 hours PRN Agitation  ibuprofen  Tablet. 400 milliGRAM(s) Oral every 6 hours PRN Mild Pain (1 - 3), Moderate Pain (4 - 6)  LORazepam     Tablet 2 milliGRAM(s) Oral every 6 hours PRN Agitation  polyethylene glycol 3350 17 Gram(s) Oral at bedtime PRN Constipation  
MEDICATIONS  (STANDING):  divalproex  milliGRAM(s) Oral two times a day  haloperidol     Tablet 10 milliGRAM(s) Oral daily    MEDICATIONS  (PRN):  aluminum hydroxide/magnesium hydroxide/simethicone Suspension 30 milliLiter(s) Oral every 4 hours PRN Dyspepsia  diphenhydrAMINE 50 milliGRAM(s) Oral every 6 hours PRN Rash and/or Itching, EPS  haloperidol     Tablet 5 milliGRAM(s) Oral every 6 hours PRN Agitation  ibuprofen  Tablet. 400 milliGRAM(s) Oral every 6 hours PRN Mild Pain (1 - 3), Moderate Pain (4 - 6)  LORazepam     Tablet 2 milliGRAM(s) Oral every 6 hours PRN Agitation  polyethylene glycol 3350 17 Gram(s) Oral at bedtime PRN Constipation

## 2023-07-10 NOTE — BH INPATIENT PSYCHIATRY PROGRESS NOTE - NSBHCONSDANGERSELF_PSY_A_CORE
suicidal ideation with plan and means/unable to care for self
suicidal ideation with plan and means/unable to care for self

## 2023-07-10 NOTE — BH INPATIENT PSYCHIATRY PROGRESS NOTE - PRN MEDS
MEDICATIONS  (PRN):  aluminum hydroxide/magnesium hydroxide/simethicone Suspension 30 milliLiter(s) Oral every 4 hours PRN Dyspepsia  diphenhydrAMINE 50 milliGRAM(s) Oral every 6 hours PRN Rash and/or Itching, EPS  haloperidol     Tablet 5 milliGRAM(s) Oral every 6 hours PRN Agitation  ibuprofen  Tablet. 400 milliGRAM(s) Oral every 6 hours PRN Mild Pain (1 - 3), Moderate Pain (4 - 6)  LORazepam     Tablet 2 milliGRAM(s) Oral every 6 hours PRN Agitation  polyethylene glycol 3350 17 Gram(s) Oral at bedtime PRN Constipation  
MEDICATIONS  (PRN):  aluminum hydroxide/magnesium hydroxide/simethicone Suspension 30 milliLiter(s) Oral every 4 hours PRN Dyspepsia  diphenhydrAMINE 50 milliGRAM(s) Oral every 6 hours PRN Rash and/or Itching, EPS  haloperidol     Tablet 5 milliGRAM(s) Oral every 6 hours PRN Agitation  ibuprofen  Tablet. 400 milliGRAM(s) Oral every 6 hours PRN Mild Pain (1 - 3), Moderate Pain (4 - 6)  LORazepam     Tablet 2 milliGRAM(s) Oral every 6 hours PRN Agitation  polyethylene glycol 3350 17 Gram(s) Oral at bedtime PRN Constipation

## 2023-07-10 NOTE — BH INPATIENT PSYCHIATRY PROGRESS NOTE - NSBHFUPINTERVALHXFT_PSY_A_CORE
Patient discharge focused, reporting that sxs have resolved, denies si/hi/avh or PI, taking meds as required. Asked about assistance with housing and getting ID etc, discussed with pt his options. No acute medical concerns. 
Patient accepting PO medications. Reports mood is "okay" and is asking for discharge. Discussed timeline for effectiveness of medications and provided further psychoeducation. Denies BRETT.

## 2023-07-11 VITALS
RESPIRATION RATE: 18 BRPM | HEART RATE: 73 BPM | OXYGEN SATURATION: 96 % | SYSTOLIC BLOOD PRESSURE: 121 MMHG | TEMPERATURE: 99 F | WEIGHT: 175.05 LBS | DIASTOLIC BLOOD PRESSURE: 78 MMHG

## 2023-07-11 LAB
A1C WITH ESTIMATED AVERAGE GLUCOSE RESULT: 5.9 % — HIGH (ref 4–5.6)
ESTIMATED AVERAGE GLUCOSE: 123 MG/DL — HIGH (ref 68–114)

## 2023-07-11 PROCEDURE — 83036 HEMOGLOBIN GLYCOSYLATED A1C: CPT

## 2023-07-11 PROCEDURE — 99238 HOSP IP/OBS DSCHRG MGMT 30/<: CPT

## 2023-07-11 PROCEDURE — 85025 COMPLETE CBC W/AUTO DIFF WBC: CPT

## 2023-07-11 PROCEDURE — 86769 SARS-COV-2 COVID-19 ANTIBODY: CPT

## 2023-07-11 PROCEDURE — 36415 COLL VENOUS BLD VENIPUNCTURE: CPT

## 2023-07-11 PROCEDURE — 80164 ASSAY DIPROPYLACETIC ACD TOT: CPT

## 2023-07-11 PROCEDURE — 80053 COMPREHEN METABOLIC PANEL: CPT

## 2023-07-11 PROCEDURE — 80061 LIPID PANEL: CPT

## 2023-07-11 RX ORDER — DIVALPROEX SODIUM 500 MG/1
1 TABLET, DELAYED RELEASE ORAL
Qty: 28 | Refills: 0
Start: 2023-07-11 | End: 2023-07-24

## 2023-07-11 RX ORDER — HALOPERIDOL DECANOATE 100 MG/ML
1 INJECTION INTRAMUSCULAR
Qty: 14 | Refills: 0
Start: 2023-07-11 | End: 2023-07-24

## 2023-07-11 RX ORDER — DIVALPROEX SODIUM 500 MG/1
1 TABLET, DELAYED RELEASE ORAL
Qty: 0 | Refills: 0 | DISCHARGE
Start: 2023-07-11

## 2023-07-11 RX ORDER — HALOPERIDOL DECANOATE 100 MG/ML
1 INJECTION INTRAMUSCULAR
Qty: 0 | Refills: 0 | DISCHARGE
Start: 2023-07-11

## 2023-07-11 RX ADMIN — DIVALPROEX SODIUM 500 MILLIGRAM(S): 500 TABLET, DELAYED RELEASE ORAL at 10:13

## 2023-07-11 RX ADMIN — HALOPERIDOL DECANOATE 10 MILLIGRAM(S): 100 INJECTION INTRAMUSCULAR at 10:13

## 2023-07-11 NOTE — BH DISCHARGE NOTE NURSING/SOCIAL WORK/PSYCH REHAB - NSDCBELONGINGSDISPO_PSY_ALL_CORE
Re submitted for prior auth with Dx code  
Rosuvastatin Pending    Insurance response  Prescription Drug Insurance: [employer]  Notes: Prior authorization submitted - will update provider when decision has been made by insurance.           
Returned to patient

## 2023-07-11 NOTE — BH INPATIENT PSYCHIATRY DISCHARGE NOTE - NSDCCPCAREPLAN_GEN_ALL_CORE_FT
PRINCIPAL DISCHARGE DIAGNOSIS  Diagnosis: Schizoaffective disorder  Assessment and Plan of Treatment: Continue medication regimen and follow up with outpatient appointments

## 2023-07-11 NOTE — BH DISCHARGE NOTE NURSING/SOCIAL WORK/PSYCH REHAB - NSCDUDCCRISIS_PSY_A_CORE
Critical access hospital Well  1 [phone]) [facility] ([phone])  Text "WELL" to 63925  Website: www.[employer].Livevol/.National Suicide Prevention Lifeline 3 (805) 330-0015/.  [facility]  1 (675) LIFENET (289-5998)/988 Suicide and Crisis Lifeline [facility] Well  1 (143) [facility]-WELL ([phone])  Text "WELL" to 56312  Website: www.Trist/.National Suicide Prevention Lifeline 0 (238) 003-9762/.  [employer]  1 (644) LIFENET (165-8802)/.  [facility] Helpline / [facility] Mobile Crisis  (182) 217-TALK (4721)/988 Suicide and Crisis Lifeline

## 2023-07-11 NOTE — BH DISCHARGE NOTE NURSING/SOCIAL WORK/PSYCH REHAB - PATIENT PORTAL LINK FT
You can access the FollowMyHealth Patient Portal offered by [facility] by registering at the following website: [email or web address]. By joining [facility]’s FollowMyHealth portal, you will also be able to view your health information using other applications (apps) compatible with our system.

## 2023-07-11 NOTE — BH INPATIENT PSYCHIATRY DISCHARGE NOTE - NSBHFUPINTERVALHXFT_PSY_A_CORE
Patient visible on the unit, seen walking around the unit, bright, pleasant and appropriately interacting with peers and staff. No si/hi/avh or PI endorsed. Future oriented and requesting discharge. Appropriate for discharge.

## 2023-07-11 NOTE — BH INPATIENT PSYCHIATRY DISCHARGE NOTE - HOSPITAL COURSE
Upon presentation to unit, patient was restarted on previous home medications of Depakote 500mg bid and haldol 10mg qd, tolerated well without issue. He denied thoughts of death, suicide or thoughts of harming himself. No hi/avh or PI. Patient began to request discharge once it was discussed with him that housing would not be provided to him. He verbalized that he would be return to his assigned shelter, but would require outpatient mental health treatment. No acute medical concerns during admission. Fair sleep and appetite during admission. He was visible on the unit, attending select groups and observed appropriately interacting with peers and staff. [facility]. Haldol decanoate discussed with pt, but he declined. Safety plan completed prior to discharge.

## 2023-07-11 NOTE — BH INPATIENT PSYCHIATRY DISCHARGE NOTE - NSBHASSESSSUMMFT_PSY_ALL_CORE
42 year old male, single, domiciled in shelter, unemployed with no significant PMH and PPH schizoaffective disorder, substance induced psychosis, PSA (MJ, etoh, ccn), PTSD, inconsistent report of prior suicide attempt (denies today and only endorses past suicide ideation, per chart had reported prior suicide attempts), prior arrests for drug related charges, now BIBS reporting suicide ideation and CAH to kill himself."    Plan:  - haloperidol 10 mg PO daily  - continue depakote  mg PO BID

## 2023-07-11 NOTE — BH INPATIENT PSYCHIATRY DISCHARGE NOTE - DESCRIPTION
Born on [place] and raised by Maternal uncle.  He reports that he has no siblings.  He states that he was living at the [facility] for the past three months.  He reports completing an 11th grade education.  He supports himself with SSI.  He reports that he has one 18 year old son with whom he has no contact.  He identifies as Mu-ism.

## 2023-07-11 NOTE — BH TREATMENT PLAN - NSTXSUICIDINTERRN_PSY_ALL_CORE
Assess and monitor potential for self-harm including ideation, intention, plan and lethality.   Initiate suicide-risk precautions; provide a safe environment; remove all potentially dangerous items; monitor all activities carefully.

## 2023-07-11 NOTE — BH INPATIENT PSYCHIATRY DISCHARGE NOTE - NSDCMRMEDTOKEN_GEN_ALL_CORE_FT
divalproex sodium 500 mg oral delayed release tablet: 1 tab(s) orally 2 times a day  haloperidol 10 mg oral tablet: 1 tab(s) orally once a day

## 2023-07-11 NOTE — BH INPATIENT PSYCHIATRY DISCHARGE NOTE - HPI (INCLUDE ILLNESS QUALITY, SEVERITY, DURATION, TIMING, CONTEXT, MODIFYING FACTORS, ASSOCIATED SIGNS AND SYMPTOMS)
42 year old male, single, domiciled in shelter, unemployed with no significant PMH and PPH schizoaffective disorder, substance induced psychosis, PSA (MJ, etoh, ccn), PTSD, inconsistent report of prior suicide attempt (denies today and only endorses past suicide ideation, per chart had reported prior suicide attempts), prior arrests for drug related charges, now BIBS reporting suicide ideation and CAH to kill himself.    On evaluation, the patient is calm, cooperative, oddly related with good eye contact, euthymic affect and demonstrating good behavioral control and linear thought processes.  The patient is primarily concerned with obtaining a new ID, SSC and establishing supportive housing.  The patient states that he was informed that if he was admitted that social work could get him his own apartment.  Expectations are established and the patient expresses disappointment.  The patient denies current SI/HI, intent, plan but states that he continues to experience CAH telling him to hang himself.  The patient does not appear internally preoccupied during the interview.  The patient states that he has been experiencing daily low mood given that he is in a shelter in which he does not feel safe.  The patient states that he cannot go back to his shelter and then inquires about a State Hospitalization.  When asked about depressive symptoms, thep atient only states low mood but denies anhedonia, feelings of guilt, insomnia, decreased energy, poor attention/concentration and changes in appetite.  He denies any symptoms consistent with elijah including elevated/irritable mood, increase in goal directed activity, decreased need for sleep, pressured speech, flight of ideas and grandiosity.  The patient states that he has no preferences regarding medications and perseverates on acquiring housing the necessary pieces of personal identification to  his SSD.  He contracts for safety on the unit.  He denies any allergies.  The patient agrees to continue home haloperidol and Depakote, the latter of which is subtherapeutic.  All questions and concerns addressed.

## 2023-07-11 NOTE — BH DISCHARGE NOTE NURSING/SOCIAL WORK/PSYCH REHAB - NSTOBACCOOTHER_PSY_ALL_CORE_FT
Call [facility] Smokers' Quitline at [phone] ([phone]) or visit [email or web address]

## 2023-07-11 NOTE — BH INPATIENT PSYCHIATRY DISCHARGE NOTE - NSBHMETABOLIC_PSY_ALL_CORE_FT
BMI: BMI (kg/m2): 28.7 (07-07-23 @ 22:44)  HbA1c: A1C with Estimated Average Glucose Result: 5.9 % (07-11-23 @ 07:58)    Glucose:   BP: 121/78 (07-11-23 @ 08:43) (114/77 - 129/80)  Lipid Panel: Date/Time: 07-10-23 @ 10:25  Cholesterol, Serum: 105  Direct LDL: --  HDL Cholesterol, Serum: 30  Total Cholesterol/HDL Ration Measurement: --  Triglycerides, Serum: 141

## 2023-07-12 NOTE — BH SOCIAL WORK CONFIRMATION FOLLOW UP NOTE - NSCOMMENTS_PSY_ALL_CORE
Caring Call: Pt not reached at number on file. Writer unable to leave .  Linkage call: Agency not reached, phone rang but noone answered.

## 2023-07-14 DIAGNOSIS — F17.210 NICOTINE DEPENDENCE, CIGARETTES, UNCOMPLICATED: ICD-10-CM

## 2023-07-14 DIAGNOSIS — F25.9 SCHIZOAFFECTIVE DISORDER, UNSPECIFIED: ICD-10-CM

## 2023-07-14 DIAGNOSIS — Z20.822 CONTACT WITH AND (SUSPECTED) EXPOSURE TO COVID-19: ICD-10-CM

## 2023-07-14 DIAGNOSIS — F43.10 POST-TRAUMATIC STRESS DISORDER, UNSPECIFIED: ICD-10-CM

## 2023-07-14 DIAGNOSIS — F41.9 ANXIETY DISORDER, UNSPECIFIED: ICD-10-CM

## 2023-07-14 DIAGNOSIS — R45.851 SUICIDAL IDEATIONS: ICD-10-CM

## 2023-11-18 NOTE — ED BEHAVIORAL HEALTH ASSESSMENT NOTE - NS ED BHA HOMICIDALITY PRESENT AGGRESSION PROPERTY PAST MONTH
(N39.0) Acute UTI  (primary encounter diagnosis)  Comment:   Plan: sulfamethoxazole-trimethoprim (BACTRIM DS)         800-160 MG tablet, ondansetron (ZOFRAN ODT) 4         MG ODT tab          Zofran for the nausea associated with Bactrim.      Urine culture pending, we will contact you if the sensitivities reveal a need to change the antibiotic.      (R30.0) Dysuria  Comment:   Plan: UA Macroscopic with reflex to Microscopic and         Culture - Clinic Collect, Wet prep - Clinic         Collect, Urine Microscopic Exam, Urine Culture,        fluconazole (DIFLUCAN) 150 MG tablet, CANCELED:        Chlamydia trachomatis PCR, CANCELED: Neisseria         gonorrhoeae PCR            (N76.0,  B96.89) Bacterial vaginosis  Comment:   Plan: metroNIDAZOLE (FLAGYL) 500 MG tablet            TO ED should you develop fever and worsening kidney discomfort      
None known

## 2023-12-23 NOTE — ED BEHAVIORAL HEALTH ASSESSMENT NOTE - NSBHSUBSTUSED_PSY_A_CORE
Central Prior Authorization Team   Phone: [phone]      EPA APPROVAL:      
Alcohol/Cannabis/Cocaine/Crack

## 2024-01-12 ENCOUNTER — INPATIENT (INPATIENT)
Facility: HOSPITAL | Age: 44
LOS: 25 days | Discharge: HOME | DRG: 750 | End: 2024-02-07
Attending: PSYCHIATRY & NEUROLOGY | Admitting: PSYCHIATRY & NEUROLOGY
Payer: MEDICAID

## 2024-01-12 VITALS
TEMPERATURE: 98 F | WEIGHT: 175.93 LBS | SYSTOLIC BLOOD PRESSURE: 138 MMHG | OXYGEN SATURATION: 98 % | HEART RATE: 107 BPM | DIASTOLIC BLOOD PRESSURE: 82 MMHG | RESPIRATION RATE: 20 BRPM

## 2024-01-12 DIAGNOSIS — Z90.49 ACQUIRED ABSENCE OF OTHER SPECIFIED PARTS OF DIGESTIVE TRACT: Chronic | ICD-10-CM

## 2024-01-12 PROCEDURE — 71046 X-RAY EXAM CHEST 2 VIEWS: CPT | Mod: 26

## 2024-01-12 PROCEDURE — 99285 EMERGENCY DEPT VISIT HI MDM: CPT
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
## 2024-01-12 NOTE — ED ADULT NURSE NOTE - NSFALLUNIVINTERV_ED_ALL_ED
Bed/Stretcher in lowest position, wheels locked, appropriate side rails in place/Call bell, personal items and telephone in reach/Instruct patient to call for assistance before getting out of bed/chair/stretcher/Non-slip footwear applied when patient is off stretcher/[place] to call system/Physically safe environment - no spills, clutter or unnecessary equipment/Purposeful proactive rounding/Room/bathroom lighting operational, light cord in reach Bed/Stretcher in lowest position, wheels locked, appropriate side rails in place/Call bell, personal items and telephone in reach/Instruct patient to call for assistance before getting out of bed/chair/stretcher/Non-slip footwear applied when patient is off stretcher/[place] to call system/Physically safe environment - no spills, clutter or unnecessary equipment/Purposeful proactive rounding/Room/bathroom lighting operational, light cord in reach

## 2024-01-12 NOTE — ED PROVIDER NOTE - DIFFERENTIAL DIAGNOSIS
Electrolyte abnormalities, dehydration, FELTON, hyperglycemia, hypoglycemia, symptomatic anemia.  r/o cardiac arrhythmia/ischemia,   r/o PTx. Differential Diagnosis

## 2024-01-12 NOTE — ED PROVIDER NOTE - CLINICAL SUMMARY MEDICAL DECISION MAKING FREE TEXT BOX
Chest X-rays reviewed and interpreted by me Dr. Manriquez and shows no actue findings. No Pneumothorax, no free air, no effusions, and these findings discussed with patient.  EKG reviewed by me Dr. Manriquez and shows, sinus rhythm, MS/QRS/QTC intervals are in acceptable range, no significant ST/T wave changes noted.  Laboratory results reviewed and discussed with patient. CBC shows no drop in H/H, BMP shows no FELTON, Troponin levels noted, 3rd trop is WNL.   Patient remained stable in ED, labs/CXR/EKG findings reviewed and discussed with patient. Discussed with EDOU/OBS provider, patient is admitted to EDOU for further evaluation of her symptoms. Chest X-rays reviewed and interpreted by me Dr. Manriquez and shows no actue findings. No Pneumothorax, no free air, no effusions, and these findings discussed with patient.  EKG reviewed by me Dr. Manriquez and shows, sinus rhythm, NM/QRS/QTC intervals are in acceptable range, no significant ST/T wave changes noted.  Laboratory results reviewed and discussed with patient. CBC shows no drop in H/H, BMP shows no FELTON, Troponin levels noted, 3rd trop is WNL.   Patient remained stable in ED, labs/CXR/EKG findings reviewed and discussed with patient. Discussed with EDOU/OBS provider, patient is admitted to EDOU for further evaluation of her symptoms.

## 2024-01-12 NOTE — ED PROVIDER NOTE - CARE PLAN
[FreeTextEntry1] : right sided head pain radiating to the right side of the neck [de-identified] : This is a 68 yrs old female with PmHx of MIgraines (dx over 20 yrs ago), seizures, DVT on the right leg in 2012 (s/p right knee surgery), DM, presents today for evaluation of right sided head pain radiating to the right side of the neck for the past year. The worse pain is behind the right ear. Reports of equilibrium being off. Denies radicular arm pain, paresthesias, weakness. Recently, it has been affecting the left side of her neck. \par She has seen Dr. Mathew who have her a cortisone shot which did not help, Dr. Hernando Crabtree gave her cervical block injection??, which did not help, Dr. Edward Gale gave her EDUAR x1 and acupuncture which did not help, and recently she received right occipital nerve block from Dr. Diaz last month which did not help. \par Symptom is aggravated with ROM, ronal to the right, and alleviated when she takes Advil. \par Her symptoms has worsen since her last MRI in 2021. \par \par Her migraines can occur sporadically, in the frontal or to the top of her head. It is alleviated by NSAID. Tried and failed Topamax and lamotrigine, Her prior neurologist was Dr. Louis/Dr. Yi, she is seeking to establish care with another neurologist \par MRI of the cervical spine w/o contrast done on 11/2021 at [facility] showed cervical spondylosis at  C3-4 right foraminal stenosis, C5-6, C6-7 with loss of disc height - no cord compression   1 Principal Discharge DX:	Lightheadedness

## 2024-01-12 NOTE — ED ADULT NURSE NOTE - OBJECTIVE STATEMENT
Pt states he is hearing voices that is telling him to hurt himself. Pt states they tell him to hang himself.

## 2024-01-12 NOTE — ED PROVIDER NOTE - PROGRESS NOTE DETAILS
Discussed with Tele Psychiatry on call, pending their evaluation and care. Troponin results reviewed and Discussed with cardiology fellow on call, recommended to repeat another trop and place patient in ED OBSERVATION unit.
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
## 2024-01-13 DIAGNOSIS — F25.1 SCHIZOAFFECTIVE DISORDER, DEPRESSIVE TYPE: ICD-10-CM

## 2024-01-13 DIAGNOSIS — F19.10 OTHER PSYCHOACTIVE SUBSTANCE ABUSE, UNCOMPLICATED: ICD-10-CM

## 2024-01-13 DIAGNOSIS — F41.9 ANXIETY DISORDER, UNSPECIFIED: ICD-10-CM

## 2024-01-13 PROBLEM — Z78.9 OTHER SPECIFIED HEALTH STATUS: Chronic | Status: ACTIVE | Noted: 2023-07-09

## 2024-01-13 LAB
ALBUMIN SERPL ELPH-MCNC: 4.6 G/DL — SIGNIFICANT CHANGE UP (ref 3.5–5.2)
ALBUMIN SERPL ELPH-MCNC: 4.6 G/DL — SIGNIFICANT CHANGE UP (ref 3.5–5.2)
ALP SERPL-CCNC: 76 U/L — SIGNIFICANT CHANGE UP (ref 30–115)
ALP SERPL-CCNC: 76 U/L — SIGNIFICANT CHANGE UP (ref 30–115)
ALT FLD-CCNC: 18 U/L — SIGNIFICANT CHANGE UP (ref 0–41)
ALT FLD-CCNC: 18 U/L — SIGNIFICANT CHANGE UP (ref 0–41)
ANION GAP SERPL CALC-SCNC: 12 MMOL/L — SIGNIFICANT CHANGE UP (ref 7–14)
ANION GAP SERPL CALC-SCNC: 12 MMOL/L — SIGNIFICANT CHANGE UP (ref 7–14)
APAP SERPL-MCNC: <5 UG/ML — LOW (ref 10–30)
APAP SERPL-MCNC: <5 UG/ML — LOW (ref 10–30)
AST SERPL-CCNC: 21 U/L — SIGNIFICANT CHANGE UP (ref 0–41)
AST SERPL-CCNC: 21 U/L — SIGNIFICANT CHANGE UP (ref 0–41)
BASOPHILS # BLD AUTO: 0.03 K/UL — SIGNIFICANT CHANGE UP (ref 0–0.2)
BASOPHILS # BLD AUTO: 0.03 K/UL — SIGNIFICANT CHANGE UP (ref 0–0.2)
BASOPHILS NFR BLD AUTO: 0.4 % — SIGNIFICANT CHANGE UP (ref 0–1)
BASOPHILS NFR BLD AUTO: 0.4 % — SIGNIFICANT CHANGE UP (ref 0–1)
BILIRUB DIRECT SERPL-MCNC: 0.3 MG/DL — SIGNIFICANT CHANGE UP (ref 0–0.3)
BILIRUB DIRECT SERPL-MCNC: 0.3 MG/DL — SIGNIFICANT CHANGE UP (ref 0–0.3)
BILIRUB INDIRECT FLD-MCNC: 1.4 MG/DL — HIGH (ref 0.2–1.2)
BILIRUB INDIRECT FLD-MCNC: 1.4 MG/DL — HIGH (ref 0.2–1.2)
BILIRUB SERPL-MCNC: 1.7 MG/DL — HIGH (ref 0.2–1.2)
BILIRUB SERPL-MCNC: 1.7 MG/DL — HIGH (ref 0.2–1.2)
BUN SERPL-MCNC: 20 MG/DL — SIGNIFICANT CHANGE UP (ref 10–20)
BUN SERPL-MCNC: 20 MG/DL — SIGNIFICANT CHANGE UP (ref 10–20)
CALCIUM SERPL-MCNC: 9.8 MG/DL — SIGNIFICANT CHANGE UP (ref 8.4–10.5)
CALCIUM SERPL-MCNC: 9.8 MG/DL — SIGNIFICANT CHANGE UP (ref 8.4–10.5)
CHLORIDE SERPL-SCNC: 104 MMOL/L — SIGNIFICANT CHANGE UP (ref 98–110)
CHLORIDE SERPL-SCNC: 104 MMOL/L — SIGNIFICANT CHANGE UP (ref 98–110)
CK SERPL-CCNC: 241 U/L — HIGH (ref 0–225)
CK SERPL-CCNC: 241 U/L — HIGH (ref 0–225)
CO2 SERPL-SCNC: 27 MMOL/L — SIGNIFICANT CHANGE UP (ref 17–32)
CO2 SERPL-SCNC: 27 MMOL/L — SIGNIFICANT CHANGE UP (ref 17–32)
CREAT SERPL-MCNC: 1.1 MG/DL — SIGNIFICANT CHANGE UP (ref 0.7–1.5)
CREAT SERPL-MCNC: 1.1 MG/DL — SIGNIFICANT CHANGE UP (ref 0.7–1.5)
EGFR: 86 ML/MIN/1.73M2 — SIGNIFICANT CHANGE UP
EGFR: 86 ML/MIN/1.73M2 — SIGNIFICANT CHANGE UP
EOSINOPHIL # BLD AUTO: 0.41 K/UL — SIGNIFICANT CHANGE UP (ref 0–0.7)
EOSINOPHIL # BLD AUTO: 0.41 K/UL — SIGNIFICANT CHANGE UP (ref 0–0.7)
EOSINOPHIL NFR BLD AUTO: 6.1 % — SIGNIFICANT CHANGE UP (ref 0–8)
EOSINOPHIL NFR BLD AUTO: 6.1 % — SIGNIFICANT CHANGE UP (ref 0–8)
ETHANOL SERPL-MCNC: <10 MG/DL — SIGNIFICANT CHANGE UP
ETHANOL SERPL-MCNC: <10 MG/DL — SIGNIFICANT CHANGE UP
GLUCOSE SERPL-MCNC: 65 MG/DL — LOW (ref 70–99)
GLUCOSE SERPL-MCNC: 65 MG/DL — LOW (ref 70–99)
HCT VFR BLD CALC: 44.1 % — SIGNIFICANT CHANGE UP (ref 42–52)
HCT VFR BLD CALC: 44.1 % — SIGNIFICANT CHANGE UP (ref 42–52)
HGB BLD-MCNC: 14.3 G/DL — SIGNIFICANT CHANGE UP (ref 14–18)
HGB BLD-MCNC: 14.3 G/DL — SIGNIFICANT CHANGE UP (ref 14–18)
IMM GRANULOCYTES NFR BLD AUTO: 0.1 % — SIGNIFICANT CHANGE UP (ref 0.1–0.3)
IMM GRANULOCYTES NFR BLD AUTO: 0.1 % — SIGNIFICANT CHANGE UP (ref 0.1–0.3)
LYMPHOCYTES # BLD AUTO: 0.54 K/UL — LOW (ref 1.2–3.4)
LYMPHOCYTES # BLD AUTO: 0.54 K/UL — LOW (ref 1.2–3.4)
LYMPHOCYTES # BLD AUTO: 8 % — LOW (ref 20.5–51.1)
LYMPHOCYTES # BLD AUTO: 8 % — LOW (ref 20.5–51.1)
MCHC RBC-ENTMCNC: 26.6 PG — LOW (ref 27–31)
MCHC RBC-ENTMCNC: 26.6 PG — LOW (ref 27–31)
MCHC RBC-ENTMCNC: 32.4 G/DL — SIGNIFICANT CHANGE UP (ref 32–37)
MCHC RBC-ENTMCNC: 32.4 G/DL — SIGNIFICANT CHANGE UP (ref 32–37)
MCV RBC AUTO: 82 FL — SIGNIFICANT CHANGE UP (ref 80–94)
MCV RBC AUTO: 82 FL — SIGNIFICANT CHANGE UP (ref 80–94)
MONOCYTES # BLD AUTO: 0.91 K/UL — HIGH (ref 0.1–0.6)
MONOCYTES # BLD AUTO: 0.91 K/UL — HIGH (ref 0.1–0.6)
MONOCYTES NFR BLD AUTO: 13.6 % — HIGH (ref 1.7–9.3)
MONOCYTES NFR BLD AUTO: 13.6 % — HIGH (ref 1.7–9.3)
NEUTROPHILS # BLD AUTO: 4.81 K/UL — SIGNIFICANT CHANGE UP (ref 1.4–6.5)
NEUTROPHILS # BLD AUTO: 4.81 K/UL — SIGNIFICANT CHANGE UP (ref 1.4–6.5)
NEUTROPHILS NFR BLD AUTO: 71.8 % — SIGNIFICANT CHANGE UP (ref 42.2–75.2)
NEUTROPHILS NFR BLD AUTO: 71.8 % — SIGNIFICANT CHANGE UP (ref 42.2–75.2)
NRBC # BLD: 0 /100 WBCS — SIGNIFICANT CHANGE UP (ref 0–0)
NRBC # BLD: 0 /100 WBCS — SIGNIFICANT CHANGE UP (ref 0–0)
PLATELET # BLD AUTO: 174 K/UL — SIGNIFICANT CHANGE UP (ref 130–400)
PLATELET # BLD AUTO: 174 K/UL — SIGNIFICANT CHANGE UP (ref 130–400)
PMV BLD: 9.5 FL — SIGNIFICANT CHANGE UP (ref 7.4–10.4)
PMV BLD: 9.5 FL — SIGNIFICANT CHANGE UP (ref 7.4–10.4)
POTASSIUM SERPL-MCNC: 3.6 MMOL/L — SIGNIFICANT CHANGE UP (ref 3.5–5)
POTASSIUM SERPL-MCNC: 3.6 MMOL/L — SIGNIFICANT CHANGE UP (ref 3.5–5)
POTASSIUM SERPL-SCNC: 3.6 MMOL/L — SIGNIFICANT CHANGE UP (ref 3.5–5)
POTASSIUM SERPL-SCNC: 3.6 MMOL/L — SIGNIFICANT CHANGE UP (ref 3.5–5)
PROT SERPL-MCNC: 6.4 G/DL — SIGNIFICANT CHANGE UP (ref 6–8)
PROT SERPL-MCNC: 6.4 G/DL — SIGNIFICANT CHANGE UP (ref 6–8)
RBC # BLD: 5.38 M/UL — SIGNIFICANT CHANGE UP (ref 4.7–6.1)
RBC # BLD: 5.38 M/UL — SIGNIFICANT CHANGE UP (ref 4.7–6.1)
RBC # FLD: 13 % — SIGNIFICANT CHANGE UP (ref 11.5–14.5)
RBC # FLD: 13 % — SIGNIFICANT CHANGE UP (ref 11.5–14.5)
SALICYLATES SERPL-MCNC: <0.3 MG/DL — LOW (ref 4–30)
SALICYLATES SERPL-MCNC: <0.3 MG/DL — LOW (ref 4–30)
SARS-COV-2 RNA SPEC QL NAA+PROBE: DETECTED
SARS-COV-2 RNA SPEC QL NAA+PROBE: DETECTED
SODIUM SERPL-SCNC: 143 MMOL/L — SIGNIFICANT CHANGE UP (ref 135–146)
SODIUM SERPL-SCNC: 143 MMOL/L — SIGNIFICANT CHANGE UP (ref 135–146)
TROPONIN T, HIGH SENSITIVITY RESULT: 27 NG/L — HIGH (ref 6–21)
TROPONIN T, HIGH SENSITIVITY RESULT: 27 NG/L — HIGH (ref 6–21)
TROPONIN T, HIGH SENSITIVITY RESULT: 8 NG/L — SIGNIFICANT CHANGE UP (ref 6–21)
TROPONIN T, HIGH SENSITIVITY RESULT: 8 NG/L — SIGNIFICANT CHANGE UP (ref 6–21)
TROPONIN T, HIGH SENSITIVITY RESULT: <6 NG/L — SIGNIFICANT CHANGE UP (ref 6–21)
TROPONIN T, HIGH SENSITIVITY RESULT: <6 NG/L — SIGNIFICANT CHANGE UP (ref 6–21)
WBC # BLD: 6.71 K/UL — SIGNIFICANT CHANGE UP (ref 4.8–10.8)
WBC # BLD: 6.71 K/UL — SIGNIFICANT CHANGE UP (ref 4.8–10.8)
WBC # FLD AUTO: 6.71 K/UL — SIGNIFICANT CHANGE UP (ref 4.8–10.8)
WBC # FLD AUTO: 6.71 K/UL — SIGNIFICANT CHANGE UP (ref 4.8–10.8)

## 2024-01-13 PROCEDURE — 99223 1ST HOSP IP/OBS HIGH 75: CPT

## 2024-01-13 PROCEDURE — 93306 TTE W/DOPPLER COMPLETE: CPT | Mod: 26

## 2024-01-13 RX ORDER — HALOPERIDOL DECANOATE 100 MG/ML
5 INJECTION INTRAMUSCULAR EVERY 6 HOURS
Refills: 0 | Status: DISCONTINUED | OUTPATIENT
Start: 2024-01-14 | End: 2024-01-25

## 2024-01-13 RX ORDER — HYDROXYZINE HCL 10 MG
50 TABLET ORAL EVERY 12 HOURS
Refills: 0 | Status: DISCONTINUED | OUTPATIENT
Start: 2024-01-14 | End: 2024-01-16

## 2024-01-13 RX ORDER — DIPHENHYDRAMINE HCL 50 MG
50 CAPSULE ORAL EVERY 6 HOURS
Refills: 0 | Status: DISCONTINUED | OUTPATIENT
Start: 2024-01-14 | End: 2024-01-29

## 2024-01-13 RX ORDER — LANOLIN ALCOHOL/MO/W.PET/CERES
5 CREAM (GRAM) TOPICAL AT BEDTIME
Refills: 0 | Status: DISCONTINUED | OUTPATIENT
Start: 2024-01-14 | End: 2024-02-07

## 2024-01-13 RX ORDER — ACETAMINOPHEN 500 MG
650 TABLET ORAL EVERY 6 HOURS
Refills: 0 | Status: DISCONTINUED | OUTPATIENT
Start: 2024-01-14 | End: 2024-02-07

## 2024-01-13 NOTE — ED BEHAVIORAL HEALTH ASSESSMENT NOTE - LEGAL HISTORY
hx of arrest for fighting, drug charges.  Reports spending 1.5 years in alf for drug charges. hx of arrest for fighting, drug charges.  Reports spending 1.5 years in long term for drug charges.
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
## 2024-01-13 NOTE — ED BEHAVIORAL HEALTH PROGRESS NOTE - BILLING CODES
[id number] Inpatient care - moderate complexity - 25 minutes [id number] Inpatient care - moderate complexity - 25 minutes

## 2024-01-13 NOTE — ED BEHAVIORAL HEALTH PROGRESS NOTE - CASE SUMMARY/FORMULATION (CLEARLY DOCUMENT RATIONALE FOR DISPOSITION CHANGE)
The patient is a 43-year-old man,, single with no significant PMH and with PPH of schizoaffective disorder, substance use disorder (alcohol, cannabis, cocaine), substance induced psychosis, PTSD, multiple prior hospitalizations (most recently to [facility] in 11/2023), reported history of suicide attempts, prior legal history of drug-related charges, who was BIB self after trying to hang himself with a belt from command auditory hallucinations.      The patient presents with depressed mood, anhedonia, and aborted suicide attempt via hanging self with a belt, in the context of financial stressors and feelings of hopelessness. The patient has history of multiple prior hospitalizations and ED visits, is a poor utilizer of outpatient services, and has history of substance abuse. He does present with acute safety concerns of suicide attempt and inability to safety plan. As a result, he meets criteria for voluntary psychiatric admission to stabilize symptoms and to optimize medication regimen. The patient is NOT psychiatrically cleared for discharge.     Plan:   -- 9.13 admission, pending medical clearance (echo). If medically admitted, patient will need CL psych to follow.  -- Continue Haldol 10mg daily The patient is a 43-year-old man,, single with no significant PMH and with PPH of schizoaffective disorder, substance use disorder (alcohol, cannabis, cocaine), substance induced psychosis, PTSD, multiple prior hospitalizations (most recently to [facility] in 11/2023), reported history of suicide attempts, prior legal history of drug-related charges, who was BIB self after trying to hang himself with a belt from command auditory hallucinations.      The patient presents with depressed mood, anhedonia, and aborted suicide attempt via hanging self with a belt, in the context of financial stressors and feelings of hopelessness. The patient has history of multiple prior hospitalizations and ED visits, is a poor utilizer of outpatient services, and has history of substance abuse. He does present with acute safety concerns of suicide attempt and inability to safety plan. As a result, he meets criteria for voluntary psychiatric admission to stabilize symptoms and to optimize medication regimen. The patient is NOT psychiatrically cleared for discharge.     Plan:   -- 9.13 admission, pending medical clearance (echo). If medically admitted, patient will need CL psych to follow.  -- Continue Haldol 10mg daily The patient is a 43-year-old man,, single with no significant PMH and with PPH of schizoaffective disorder, substance use disorder (alcohol, cannabis, cocaine), substance induced psychosis, PTSD, multiple prior hospitalizations (most recently to [facility] in 11/2023), reported history of suicide attempts, prior legal history of drug-related charges, who was BIB self after trying to hang himself with a belt from command auditory hallucinations.      The patient presents with depressed mood, anhedonia, and aborted suicide attempt via hanging self with a belt, in the context of financial stressors and feelings of hopelessness. The patient has history of multiple prior hospitalizations and ED visits, is a poor utilizer of outpatient services, and has history of substance abuse. He does present with acute safety concerns of suicide attempt and inability to safety plan. As a result, he meets criteria for voluntary psychiatric admission to stabilize symptoms and to optimize medication regimen. The patient is NOT psychiatrically cleared for discharge.     Plan:   -- 9.13 admission since medical clearance (echo) now obtained.   -- Continue Haldol 10mg daily  -- Admit to IPP.   -- Standard PRNs  -- Remainder of plan as per primary treatment team.   -- No CO needed on IPP though pt will need a single room since he's COVID +.  The patient is a 43-year-old man,, single with no significant PMH and with PPH of schizoaffective disorder, substance use disorder (alcohol, cannabis, cocaine), substance induced psychosis, PTSD, multiple prior hospitalizations (most recently to [facility] in 11/2023), reported history of suicide attempts, prior legal history of drug-related charges, who was BIB self after trying to hang himself with a belt from command auditory hallucinations.      The patient presents with depressed mood, anhedonia, and aborted suicide attempt via hanging self with a belt, in the context of financial stressors and feelings of hopelessness. The patient has history of multiple prior hospitalizations and ED visits, is a poor utilizer of outpatient services, and has history of substance abuse. He does present with acute safety concerns of suicide attempt and inability to safety plan. As a result, he meets criteria for voluntary psychiatric admission to stabilize symptoms and to optimize medication regimen. The patient is NOT psychiatrically cleared for discharge.     Plan:   -- 9.13 admission since medical clearance (echo) now obtained.   -- Continue Haldol 10mg daily  -- Admit to IPP.   -- Standard PRNs  -- Remainder of plan as per primary treatment team.   -- No CO needed on IPP though pt will need a single room since he's COVID +.

## 2024-01-13 NOTE — ED CDU PROVIDER INITIAL DAY NOTE - PHYSICAL EXAMINATION
CONST: Well appearing in NAD  EYES: PERRL, EOMI, Sclera and conjunctiva clear. .  NECK: Non-tender, no meningeal signs  CARD: Normal S1 S2; Normal rate and rhythm  RESP: Equal BS B/L, No wheezes, rhonchi or rales. No distress  GI: Soft, non-tender, non-distended.  MS: Normal ROM in all extremities. No midline spinal tenderness.  SKIN: Warm, dry, no acute rashes. Good turgor  NEURO: A&Ox3, No focal deficits. Strength 5/5 with no sensory deficits. Steady gait

## 2024-01-13 NOTE — ED BEHAVIORAL HEALTH ASSESSMENT NOTE - HPI (INCLUDE ILLNESS QUALITY, SEVERITY, DURATION, TIMING, CONTEXT, MODIFYING FACTORS, ASSOCIATED SIGNS AND SYMPTOMS)
The patient is a 43-year-old man,, single with no significant PMH and with PPH of schizoaffective disorder, substance use disorder (alcohol, cannabis, cocaine), substance induced psychosis, PTSD, multiple prior hospitalizations (most recently to [facility] in 11/2023), reported history of suicide attempts, prior legal history of drug-related charges, who was BIB self after trying to hang himself with a belt from command auditory hallucinations.      The patient presents calm, cooperative, dysphoric-appearing, linear, answering questions appropriately.     The patient states that for the past several days, he has been feeling increasingly depressed. He has not been eating as much, isolating himself from others. He cites financial issues and not having a job and not having support from others as big stressors in his life. On the day of presentation, he was having command auditory hallucinations telling him to kill himself and therefore he proceeded to grab a belt and wrapped it around his neck to try and hang himself. His mother was there and stopped him from going through with it. She told him that he should tell her when he’s not feeling well instead of going through with it. He decided to then bring himself into the ED for help. He currently denies SI, plan, or intent but is worried about what would happen if he were to leave the hospital. He denies any HI.     The patient takes Haldol 10mg daily as his only medication. He last saw his outpatient psychiatrist at [facility] 2 months ago, has not had subsequent visits.     The patient was most recently admitted psychiatrically in 11/2023 to [facility] [place] for similar presentation.    The patient did not give collateral contact for mother or any family or friends, stating his mother’s phone is currently out of service. The patient is a 43-year-old man,, single with no significant PMH and with PPH of schizoaffective disorder, substance use disorder (alcohol, cannabis, cocaine), substance induced psychosis, PTSD, multiple prior hospitalizations (most recently to [facility] in 11/2023), reported history of suicide attempts, prior legal history of drug-related charges, who was BIB self after trying to hang himself with a belt from command auditory hallucinations.      The patient presents calm, cooperative, dysphoric-appearing, linear, answering questions appropriately.     The patient states that for the past several days, he has been feeling increasingly depressed. He has not been eating as much, isolating himself from others. He cites financial issues and not having a job and not having support from others as big stressors in his life. On the day of presentation, he was having command auditory hallucinations telling him to kill himself and therefore he proceeded to grab a belt and wrapped it around his neck to try and hang himself. His mother was there and stopped him from going through with it. She told him that he should tell her when he’s not feeling well instead of going through with it. He decided to then bring himself into the ED for help. He currently denies SI, plan, or intent but is worried about what would happen if he were to leave the hospital. He denies any HI.     The patient takes Haldol 10mg daily as his only medication. He last saw his outpatient psychiatrist at [facility] 2 months ago, has not had subsequent visits.     The patient was most recently admitted psychiatrically in 11/2023 to [facility] for similar presentation.    The patient did not give collateral contact for mother or any family or friends, stating his mother’s phone is currently out of service.

## 2024-01-13 NOTE — ED CDU PROVIDER INITIAL DAY NOTE - CLINICAL SUMMARY MEDICAL DECISION MAKING FREE TEXT BOX
42yo man h/o schizoaffective d/o/schizophrenia was placed in CDU for syncope workup as well as psych evaluation after he presented with syncope followed by attempt to hang  himself with a belt. ED workup was significant for mildly elevated troponin which decreased on second trop; EKG nonischemic. Pt monitored without incident and is awaiting echo. Pt was evaluated by psych and plan is for voluntary admission once echo is completed and pt is medically cleared. VS, exam as noted, pt calm and cooperative on my eval tho he does complain that he is hearing voices. Will continue to observe.

## 2024-01-13 NOTE — ED BEHAVIORAL HEALTH ASSESSMENT NOTE - DIFFERENTIAL
schizoaffective disorder, r/o depression, r/o schizophrenia, substance-induced mood/psychotic disorder, anxiety

## 2024-01-13 NOTE — ED BEHAVIORAL HEALTH ASSESSMENT NOTE - OTHER PAST PSYCHIATRIC HISTORY (INCLUDE DETAILS REGARDING ONSET, COURSE OF ILLNESS, INPATIENT/OUTPATIENT TREATMENT)
Diagnoses: schizoaffective disorder, r/o depression, r/o schizophrenia, substance-induced mood/psychotic disorder, anxiety     Inpatient: 20+ hospitalizations, first in 2008     Outpatient: [facility], last saw psychiatrist 2 months ago     SA: today tried hang self with belt, 6 months ago tried to hang self     NSSI: none Diagnoses: schizoaffective disorder, r/o depression, r/o schizophrenia, substance-induced mood/psychotic disorder, anxiety     Inpatient: 20+ hospitalizations, first in 2008     Outpatient: [facility], last saw psychiatrist 2 months ago     SA: today tried hang self with belt, 6 months ago tried to hang self     NSSI: none

## 2024-01-13 NOTE — ED BEHAVIORAL HEALTH ASSESSMENT NOTE - DESCRIPTION
none Patient calm and cooperative lives with mother in apartment, Born on [place] and raised by Maternal uncle. He reports that he has no siblings.  He reports completing an 11th grade education. He supports himself with SSI.  He reports that he has one 18-year-old son with whom he has no contact.  He identifies as Anabaptism. lives with mother in apartment, Born on [place] and raised by Maternal uncle. He reports that he has no siblings.  He reports completing an 11th grade education. He supports himself with SSI.  He reports that he has one 18-year-old son with whom he has no contact.  He identifies as Gnosticist.

## 2024-01-13 NOTE — ED BEHAVIORAL HEALTH ASSESSMENT NOTE - SUMMARY
The patient is a 43-year-old man,, single with no significant PMH and with PPH of schizoaffective disorder, substance use disorder (alcohol, cannabis, cocaine), substance induced psychosis, PTSD, multiple prior hospitalizations (most recently to [facility] in 11/2023), reported history of suicide attempts, prior legal history of drug-related charges, who was BIB self after trying to hang himself with a belt from command auditory hallucinations.      The patient presents with depressed mood, anhedonia, and aborted suicide attempt via hanging self with a belt, in the context of financial stressors and feelings of hopelessness. The patient has history of multiple prior hospitalizations and ED visits, is a poor utilizer of outpatient services, and has history of substance abuse. He does present with acute safety concerns of suicide attempt and inability to safety plan. As a result, he meets criteria for voluntary psychiatric admission to stabilize symptoms and to optimize medication regimen. The patient is NOT psychiatrically cleared for discharge.     Plan:   -- 9.13 admission, pending medical clearance. If medically admitted, patient will need CL psych to follow.  -- continue Haldol 10mg daily The patient is a 43-year-old man,, single with no significant PMH and with PPH of schizoaffective disorder, substance use disorder (alcohol, cannabis, cocaine), substance induced psychosis, PTSD, multiple prior hospitalizations (most recently to [facility] in 11/2023), reported history of suicide attempts, prior legal history of drug-related charges, who was BIB self after trying to hang himself with a belt from command auditory hallucinations.      The patient presents with depressed mood, anhedonia, and aborted suicide attempt via hanging self with a belt, in the context of financial stressors and feelings of hopelessness. The patient has history of multiple prior hospitalizations and ED visits, is a poor utilizer of outpatient services, and has history of substance abuse. He does present with acute safety concerns of suicide attempt and inability to safety plan. As a result, he meets criteria for voluntary psychiatric admission to stabilize symptoms and to optimize medication regimen. The patient is NOT psychiatrically cleared for discharge.     Plan:   -- 9.13 admission, pending medical clearance. If medically admitted, patient will need CL psych to follow.  -- continue Haldol 10mg daily

## 2024-01-13 NOTE — ED BEHAVIORAL HEALTH ASSESSMENT NOTE - RISK ASSESSMENT
Risk: prior hospitalizations, prior SA, poor treatment adherence, single, poor social support, history of substance abuse, unemployed  Protective: help-seeking behavior, some family support and stable housing

## 2024-01-13 NOTE — ED CDU PROVIDER INITIAL DAY NOTE - PROGRESS NOTE DETAILS
Pt endorsed to Dr Castro to continue to observe, f/u echo, likely admit psych. Patient in NAD - per psych, patient COVID positive and therefore will have to [facility] that can accommodate COVID positive patient. If not, then bed will be found elsewhere. Case endorsed to Dr. Alcocer pending bed placement.

## 2024-01-13 NOTE — ED BEHAVIORAL HEALTH PROGRESS NOTE - PATIENT PLACED ON OBSERVATION STATUS DETAIL FREE TEXT
4/20/2022     RE: Jennyfer Elizabeth  [address]  [facility] [address]     Dear Colleague,    Thank you for referring your patient, Jennyfer Elizabeth, to the [facility] [place] at [facility]. Please see a copy of my visit note below.      Vascular Surgery Consultation Note     Patient:  Jennyfer Elizabeth   Date of birth 1947, Medical record number [id number]  Date of Visit:  04/20/2022  Consult Requester:No att. providers found            Assessment and Recommendations:   ASSESSMENT / RECOMMENDATION:    Doing well status post right femoral endarterectomy with right common iliac artery angioplasty and stenting.  We will plan for 6 months of Plavix.  She has been noting significant bruising and thus I have asked her to discontinue the aspirin 81 mg, and continue forth on the Plavix and statin.  At our 6-month follow-up, if all is well, we will plan to discontinue the Plavix and resume aspirin 81 mg daily as the sole antiplatelet therapy.  I advised a dry gauze or nonstick gauze between the incision and her undergarments so as not to irritate while healing.    Many thanks for involving me in the care of this very pleasant patient. Should any questions or concerns arise, please don't hesitate to contact me.    Warm Regards,    Rama Sewell MD, DFSVS, RPVI  Director, [facility] Services  Professor and Chief, Vascular and Endovascular Surgery  [facility]  Pager: 1. Send message or 10 digit call back number Securely via LoLo with the LoLo Web Console (learn more here)              2. Outside of [facility]? Call [phone]        40 minutes spent on the date of the encounter doing chart review, review of outside records, review of test results, interpretation of tests, patient visit and documentation           HPI: Jennyfer returns today for a postoperative visit following a right femoral endarterectomy with  ipsilateral right common iliac artery angioplasty and stenting.  She has been able to walk without any right hip or leg issue.  She is facing issues with her C-spine and right shoulder.  She has noted more bruising with the combination of Plavix and aspirin.  While her incision is healed, she notes some irritation from where her underwear rubs.    Review of Systems   Constitutional, HEENT, cardiovascular, pulmonary, gi and gu systems are negative, except as otherwise noted.    Physical Exam   GENERAL: Healthy, alert and no distress  EYES: Eyes grossly normal to inspection.  No discharge or erythema, or obvious scleral/conjunctival abnormalities.  RESP: No audible wheeze, cough, or visible cyanosis.  No visible retractions or increased work of breathing.    SKIN: Visible skin clear. No significant rash, abnormal pigmentation or lesions.  NEURO: Cranial nerves grossly intact.  Mentation and speech appropriate for age.  PSYCH: Mentation appears normal, affect normal/bright, judgement and insight intact, normal speech and appearance well-groomed.    TOMMY: 0.89 / 1.10 (Previous 0.72 / 1.10)    Again, thank you for allowing me to participate in the care of your patient.      Sincerely,    Rama Sewell MD     For echo

## 2024-01-13 NOTE — ED BEHAVIORAL HEALTH PROGRESS NOTE - NSBHATTESTCOMMENTATTENDFT_PSY_A_CORE
I was present for and agree with Dr. Helton's evaluation, treatment and plan. Patient to be admitted to inpatient psych for suicidality. No CO needed at this time, but pt will need to be in a single room due to COVID + status.

## 2024-01-13 NOTE — ED BEHAVIORAL HEALTH PROGRESS NOTE - NSBHATTESTTYPEVISIT_PSY_A_CORE
Pneumonia Acute respiratory failure with hypoxia ESRD (end stage renal disease) on dialysis Attending with Resident/Fellow/Student

## 2024-01-13 NOTE — ED BEHAVIORAL HEALTH ASSESSMENT NOTE - NSBHATTESTBILLING_PSY_A_CORE
[id number] diagnostic evaluation with medical services [id number] diagnostic evaluation with medical services

## 2024-01-14 DIAGNOSIS — F20.9 SCHIZOPHRENIA, UNSPECIFIED: ICD-10-CM

## 2024-01-14 PROCEDURE — 80053 COMPREHEN METABOLIC PANEL: CPT

## 2024-01-14 PROCEDURE — 87798 DETECT AGENT NOS DNA AMP: CPT

## 2024-01-14 PROCEDURE — 0225U NFCT DS DNA&RNA 21 SARSCOV2: CPT

## 2024-01-14 PROCEDURE — 87040 BLOOD CULTURE FOR BACTERIA: CPT

## 2024-01-14 PROCEDURE — 86645 CMV ANTIBODY IGM: CPT

## 2024-01-14 PROCEDURE — 86618 LYME DISEASE ANTIBODY: CPT

## 2024-01-14 PROCEDURE — 80048 BASIC METABOLIC PNL TOTAL CA: CPT

## 2024-01-14 PROCEDURE — 36415 COLL VENOUS BLD VENIPUNCTURE: CPT

## 2024-01-14 PROCEDURE — 86663 EPSTEIN-BARR ANTIBODY: CPT

## 2024-01-14 PROCEDURE — 99233 SBSQ HOSP IP/OBS HIGH 50: CPT

## 2024-01-14 PROCEDURE — 84443 ASSAY THYROID STIM HORMONE: CPT

## 2024-01-14 PROCEDURE — 85610 PROTHROMBIN TIME: CPT

## 2024-01-14 PROCEDURE — 83735 ASSAY OF MAGNESIUM: CPT

## 2024-01-14 PROCEDURE — 80061 LIPID PANEL: CPT

## 2024-01-14 PROCEDURE — 85027 COMPLETE CBC AUTOMATED: CPT

## 2024-01-14 PROCEDURE — 86665 EPSTEIN-BARR CAPSID VCA: CPT

## 2024-01-14 PROCEDURE — 86644 CMV ANTIBODY: CPT

## 2024-01-14 PROCEDURE — 83036 HEMOGLOBIN GLYCOSYLATED A1C: CPT

## 2024-01-14 PROCEDURE — 86706 HEP B SURFACE ANTIBODY: CPT

## 2024-01-14 PROCEDURE — 71046 X-RAY EXAM CHEST 2 VIEWS: CPT

## 2024-01-14 PROCEDURE — 86704 HEP B CORE ANTIBODY TOTAL: CPT

## 2024-01-14 PROCEDURE — 87207 SMEAR SPECIAL STAIN: CPT

## 2024-01-14 PROCEDURE — 85025 COMPLETE CBC W/AUTO DIFF WBC: CPT

## 2024-01-14 PROCEDURE — 80076 HEPATIC FUNCTION PANEL: CPT

## 2024-01-14 PROCEDURE — 80074 ACUTE HEPATITIS PANEL: CPT

## 2024-01-14 PROCEDURE — 76705 ECHO EXAM OF ABDOMEN: CPT

## 2024-01-14 PROCEDURE — 87389 HIV-1 AG W/HIV-1&-2 AB AG IA: CPT

## 2024-01-14 PROCEDURE — 85730 THROMBOPLASTIN TIME PARTIAL: CPT

## 2024-01-14 PROCEDURE — 86664 EPSTEIN-BARR NUCLEAR ANTIGEN: CPT

## 2024-01-14 PROCEDURE — 82550 ASSAY OF CK (CPK): CPT

## 2024-01-14 RX ORDER — HALOPERIDOL DECANOATE 100 MG/ML
10 INJECTION INTRAMUSCULAR DAILY
Refills: 0 | Status: DISCONTINUED | OUTPATIENT
Start: 2024-01-14 | End: 2024-01-16

## 2024-01-14 RX ORDER — HALOPERIDOL DECANOATE 100 MG/ML
5 INJECTION INTRAMUSCULAR EVERY 6 HOURS
Refills: 0 | Status: DISCONTINUED | OUTPATIENT
Start: 2024-01-14 | End: 2024-01-14

## 2024-01-14 RX ORDER — HALOPERIDOL DECANOATE 100 MG/ML
5 INJECTION INTRAMUSCULAR ONCE
Refills: 0 | Status: COMPLETED | OUTPATIENT
Start: 2024-01-14 | End: 2024-01-14

## 2024-01-14 RX ADMIN — HALOPERIDOL DECANOATE 5 MILLIGRAM(S): 100 INJECTION INTRAMUSCULAR at 09:01

## 2024-01-14 RX ADMIN — HALOPERIDOL DECANOATE 10 MILLIGRAM(S): 100 INJECTION INTRAMUSCULAR at 19:37

## 2024-01-14 RX ADMIN — Medication 50 MILLIGRAM(S): at 19:37

## 2024-01-14 NOTE — ED BEHAVIORAL HEALTH PROGRESS NOTE - NSBHMSETHTASSOC_PSY_A_CORE
[address]  PRIMARY CARE  [address] [facility] [address]  Dept: [phone]  Dept Fax: [phone]: [phone]     32 Trey Fuentes (: 1965) is a 49 y.o. female, Established patient, here for evaluation of the following chief complaint(s):  New Patient (est care, not feeling well)      PCP:  COOPER Baxter CNP      Pt here today to est care. She doesn't feel well. Is passing blood when pooping. Lately has been having several episodes a day, prior to this normally just has a BM 1-2 ties a week. Her stomach just is very painful. Having pain a lot with using bathroom. Consistency of stool doesn't seem to matter. Feels like could pass out when goes to bathroom d/t pain in her abdomen, not rectum. Takes a long time to use the bathroom, feels like hard to just go. Feeling very bloated almost like when had h.pylori in past and was treated. No heartburn. No weight loss. Is very short of breath, doesn't feel like asthma. Like going up stairs really winds her. No energy, falling asleep at work- been quite awhile. Almost passed out the other day before work. Has been having a lot of dizziness for couple of months. Has been caring for her  who was battling cancer, now in remission. 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 Is also working as  at [employer], plans to put her 2 weeks notice in and is excited about it. Her 's disability was just approved so able to quit now. Has never been a good sleeper, so doesn't get a full nights sleep. Her legs hurt so bad, she just can't sleep. Wakes with cramps in her calves couple of times a night. A long time ago was checked for blood clots for this, but was ok. Now that older she feels pain is more real and unbearable. Not one to come to the dr. Jero Nevarez age 31 is dying from colon cancer.  Mom has
had cancer 3 times, breast cancer twice and 3rd time is in her lymph nodes. Chronic headaches. Waking up every morning with headache. Used to drink a lot of water, but admits not drinking much. Has been relying on energy drinks. Rash: gets dermatitis. Breaks out very quickly and easily with minimal triggers. Saw derm. LMP was 4/21 so has been over a year without any bleeding. Review of Systems   Constitutional: Positive for fatigue. Negative for unexpected weight change. Eyes: Positive for visual disturbance. Respiratory: Positive for shortness of breath. Negative for cough and wheezing. Cardiovascular: Positive for chest pain and palpitations. Gastrointestinal: Positive for abdominal distention, abdominal pain, blood in stool, nausea and rectal pain. Negative for vomiting. Genitourinary: Negative for difficulty urinating. Musculoskeletal: Positive for arthralgias and myalgias. Skin: Positive for rash. Neurological: Positive for dizziness, syncope, weakness, light-headedness and headaches. Negative for seizures and numbness. Psychiatric/Behavioral: Positive for sleep disturbance. Negative for dysphoric mood. The patient is nervous/anxious. The patient is not hyperactive. Past Medical History:   Diagnosis Date    H. pylori infection     Hyperplastic colonic polyp      History reviewed. No pertinent surgical history.   Social History     Socioeconomic History    Marital status:      Spouse name: Not on file    Number of children: Not on file    Years of education: Not on file    Highest education level: Not on file   Occupational History    Not on file   Tobacco Use    Smoking status: Former Smoker    Smokeless tobacco: Never Used   Substance and Sexual Activity    Alcohol use: No     Alcohol/week: 0.0 standard drinks    Drug use: No    Sexual activity: Not on file   Other Topics Concern    Not on file   Social History Narrative    Not on file     Social
Determinants of Health     Financial Resource Strain: Low Risk     Difficulty of Paying Living Expenses: Not hard at all   Food Insecurity: No Food Insecurity    Worried About Running Out of Food in the Last Year: Never true    Sandeep of Food in the Last Year: Never true   Transportation Needs:     Lack of Transportation (Medical): Not on file    Lack of Transportation (Non-Medical): Not on file   Physical Activity:     Days of Exercise per Week: Not on file    Minutes of Exercise per Session: Not on file   Stress:     Feeling of Stress : Not on file   Social Connections:     Frequency of Communication with Friends and Family: Not on file    Frequency of Social Gatherings with Friends and Family: Not on file    Attends Confucianism Services: Not on file    Active Member of [address] [place] [address] [employer] or Organizations: Not on file    Attends Club or Organization Meetings: Not on file    Marital Status: Not on file   Intimate Partner Violence:     Fear of Current or Ex-Partner: Not on file    Emotionally Abused: Not on file    Physically Abused: Not on file    Sexually Abused: Not on file   Housing Stability:     Unable to Pay for Housing in the Last Year: Not on file    Number of [place] in the Last Year: Not on file    Unstable Housing in the Last Year: Not on file     Family History   Problem Relation Age of Onset    Cancer Mother         breast cancer    Asthma Father     Migraines Sister     Colon Cancer Nephew 34        terminal      No Known Allergies  Prior to Admission medications    Medication Sig Start Date End Date Taking? Authorizing Provider   betamethasone valerate (VALISONE) 0.1 % cream Apply topically 2 times daily.  5/16/22  Yes COOPER Baxter CNP   albuterol sulfate  (90 Base) MCG/ACT inhaler Inhale 2 puffs into the lungs every 6 hours as needed for Wheezing 5/16/22  Yes COOPER Baxter CNP   buPROPion [facility] SR) 150 MG extended release tablet Take 1 tablet by mouth 2
times daily 5/16/22  Yes COOPER Garza - CNP   fluticasone [facility]) 50 MCG/ACT nasal spray 1 spray by Nasal route daily 4/20/22  Yes COOPER Traylor   cetirizine (ZYRTEC) 10 MG tablet Take 1 tablet by mouth daily 4/20/22 5/20/22 Yes COOPER Noonan   albuterol sulfate HFA (PROAIR HFA) 108 (90 Base) MCG/ACT inhaler Inhale 1-2 puffs into the lungs every 4 hours as needed for Wheezing or Shortness of Breath 3/6/19  Yes MARCE Bose                I have reviewed the patient's medical history in detail and updated the computerized patient record. OBJECTIVE    Vitals:    05/16/22 1028   BP: 120/74   Site: Right Upper Arm   Position: Sitting   Cuff Size: Medium Adult   Pulse: 106   Temp: 97.2 °F (36.2 °C)   TempSrc: Infrared   SpO2: 99%   Weight: 188 lb (85.3 kg)   Height: 5' 2\" (1.575 m)       Physical Exam  Vitals and nursing note reviewed. Constitutional:       General: She is not in acute distress. Appearance: Normal appearance. She is obese. Comments: When pt gets up to change positions appears light headed, eyes look swimmy. Resolves quickly with sitting back down   HENT:      Mouth/Throat:      Mouth: Mucous membranes are moist.   Eyes:      Conjunctiva/sclera: Conjunctivae normal.      Pupils: Pupils are equal, round, and reactive to light. Cardiovascular:      Rate and Rhythm: Regular rhythm. Tachycardia present. Heart sounds: Normal heart sounds. Pulmonary:      Effort: Pulmonary effort is normal. No respiratory distress. Breath sounds: Normal breath sounds. Abdominal:      General: Bowel sounds are normal.      Palpations: Abdomen is soft. There is mass (middle of lower quadrants with fullness felt, palpable lump. tender to touch. Pain in epigastric area and RUQ). Tenderness: There is abdominal tenderness. Musculoskeletal:         General: Normal range of motion. Cervical back: Normal range of motion and neck supple. No tenderness.
Lymphadenopathy:      Cervical: No cervical adenopathy. Skin:     General: Skin is warm and dry. Neurological:      Mental Status: She is alert and oriented to person, place, and time. Motor: No weakness. Gait: Gait normal.   Psychiatric:         Mood and Affect: Mood normal.         Thought Content: Thought content normal.           ASSESSMENT/ PLAN    1. Generalized abdominal pain  -acute uncontrolled  -Needs CT stat, refer for diagnostic colonoscopy, labs  - CT ABDOMEN PELVIS W IV CONTRAST Additional Contrast? Radiologist Recommendation; Future    2. Rectal bleeding  See #1  - [address]el MD, Gastroenter[facility], [place]  - CT ABDOMEN PELVIS W IV CONTRAST Additional Contrast? Radiologist Recommendation; Future    3. Bloating symptom  -check labs, send for colonoscopy. Has hx hpylori, may need EGD as well  - CBC with Auto Differential; Future  - Comprehensive Metabolic Panel; Future  - [address] João Nagel MD, [facility], [place]    4. Shortness of breath  -acute  -check labs, suspect anemia d/t blood loss  - Comprehensive Metabolic Panel; Future    5. Palpitations  -acute  -again check labs, suspect anema  - Comprehensive Metabolic Panel; Future  - TSH with Reflex; Future    6. Dermatitis  -chronic, very sensitive skin  -refilled prn steroid cram  - betamethasone valerate (VALISONE) 0.1 % cream; Apply topically 2 times daily. Dispense: 50 g; Refill: 2    7. Chronic tension-type headache, intractable  -chronic, uncontrolled  -hx of bone on bone brain tumor? Age 41 found when had headaches in past  -using energy drinks not helping, not drinking much water. Enc to increase water.   -full lab workup ordered    8. Fatigue, unspecified type  - CBC with Auto Differential; Future  - TSH with Reflex; Future    9. Screening mammogram for breast cancer  -has hx of lump on right that they monitor yearly she states  - AJNICE DIGITAL SCREEN W OR WO CAD BILATERAL; Future    10.  Family history of
colon cancer  -nephew age 29, terminal  - [address] Jo[address] Nagel MD, Gastroenterology, [place]    11. History of colonic polyps  -last cscope done at [facility] 3 yrs ago  - [address] João Nagel MD, Gastroenter[facility], [place]    12. Mild intermittent asthma without complication  -chronic, stable  - albuterol sulfate  (90 Base) MCG/ACT inhaler; Inhale 2 puffs into the lungs every 6 hours as needed for Wheezing  Dispense: 1 each; Refill: 1    13. Smoker  - Highly encourage patient to stay away from tobacco products. Discussed options, has quit in past with wellbutrin. Patient is ready to quit. Patient is aware of the risks associated with this habit  - buPROPion (WELLBUTRIN SR) 150 MG extended release tablet; Take 1 tablet by mouth 2 times daily  Dispense: 60 tablet; Refill: 3          Return in about 1 month (around 6/16/2022).      Electronically signed by:  COOPER Baig - AIME   5/16/22
Normal
Normal

## 2024-01-14 NOTE — ED BEHAVIORAL HEALTH PROGRESS NOTE - SUMMARY
Risk: prior hospitalizations, prior SA, poor treatment adherence, single, poor social support, history of substance abuse, unemployed  Protective: help-seeking behavior, some family support and stable housing
Risk: prior hospitalizations, prior SA, poor treatment adherence, single, poor social support, history of substance use, unemployed  Protective: help-seeking behavior, some family support and housing

## 2024-01-14 NOTE — ED CDU PROVIDER DISPOSITION NOTE - CLINICAL COURSE
44yo man h/o schizoaffective d/o/schizophrenia was placed in CDU for syncope workup as well as psych evaluation after he presented with syncope followed by attempt to hang  himself with a belt. ED workup was significant for mildly elevated troponin which decreased on second trop; EKG nonischemic. Pt monitored without incident and is awaiting echo. Pt was evaluated by psych and plan is for voluntary admission once echo is completed and pt is medically cleared. VS, exam as noted, pt calm and cooperative on my eval tho he does complain that he is hearing voices. Echo was normal, syncope likely vagal vs orthostatic. PT tested positive for COVID while in the ED. He was evaluated again by psych and admitted, transferred to IPP at [facility]. 44yo man h/o schizoaffective d/o/schizophrenia was placed in CDU for syncope workup as well as psych evaluation after he presented with syncope followed by attempt to hang  himself with a belt. ED workup was significant for mildly elevated troponin which decreased on second trop; EKG nonischemic. Pt monitored without incident and is awaiting echo. Pt was evaluated by psych and plan is for voluntary admission once echo is completed and pt is medically cleared. VS, exam as noted, pt calm and cooperative on my eval tho he does complain that he is hearing voices. Echo was normal, syncope likely vagal vs orthostatic. PT tested positive for COVID while in the ED. He was evaluated again by psych and admitted, transferred to IPP at [facility].

## 2024-01-14 NOTE — BH PATIENT PROFILE - HOME MEDICATIONS
haloperidol 10 mg oral tablet , 1 tab(s) orally once a day  divalproex sodium 500 mg oral delayed release tablet , 1 tab(s) orally 2 times a day  nicotine 2 mg oral transmucosal gum , 1 gum chewed every 2 hours x 14 days, As Needed -smoking cessation  Continue to take as prescribed until told otherwise by your provider  haloperidol 5 mg oral tablet , 1 tab(s) orally once a day x 14 days  Continue to take as prescribed until told otherwise by your provider  haloperidol 10 mg oral tablet , 1 tab(s) orally once a day (at bedtime) x 14 days  Continue to take as prescribed until told otherwise by your provider  benztropine 0.5 mg oral tablet , 1 tab(s) orally 2 times a day x 14 days Continue to take as prescribed until told otherwise by your provider

## 2024-01-14 NOTE — ED BEHAVIORAL HEALTH PROGRESS NOTE - TRANSFER ATTEMPTS TO INPATIENT BH SINCE LAST ASSESSMENT?
[facility]    PATIENT'S NAME: Romina MCCONNELL   ATTENDING PHYSICIAN: Dee Dee Patel M.D. PATIENT ACCOUNT #: [de-identified] LOCATION: [address] [place] [address] RECORD #: [id number] YOB: 1938   DATE OF SERVICE: 12/02/2022       CANCER CENTER PROGRESS NOTE    CHIEF COMPLAINT:  Followup for history of diffuse large B-cell lymphoma. HISTORY OF PRESENT ILLNESS:  The patient is an 82-year-old female. She has diffuse large B-cell lymphoma that arose in the area of the gastrohepatic ligament. She was treated with 5 of 6 planned cycles of chemotherapy with R-CHOP. Her chemotherapy was held due to progressive weakness. She went into complete response after 4 cycles of R-CHOP. Her last chemotherapy was given in August.  She has had multiple hospitalizations through this year for multiple comorbidities. She had some toxicity from the treatment. She has a history of congestive heart failure with preserved ejection fraction. She also has a history of pneumonia. She was recently in the hospital in October with another episode of pneumonia. She has gradually been getting stronger. She has been moving around more. She is eating better, though her weight has gone down. She still has some peripheral edema. She is due to see Dr. Flower Webb in the next week or two. Overall, she feels better. We did do an interval followup PET scan on the 18th of November, which again shows that she appears to be in complete response. She did have some moderate left pleural effusion at that time. She has had some peripheral edema, indicating iron overload. She has been on torsemide and feels as though she has lost some of the water weight, though she still has lower extremity edema. MEDICATIONS:  Her current medications include acyclovir 400 mg b.i.d.; allopurinol 300 mg daily; aspirin 81 mg daily; vitamin D 1000 units daily; esomeprazole 20 mg daily;  Levemir 10 units in the morning; levothyroxine 50 mcg daily;
metformin 500 mg b.i.d.; metoprolol tartrate 12.5 mg b.i.d.; multivitamin daily; ondansetron 8 mg q.8 h. p.r.n.; potassium chloride 10 mEq twice daily; pravastatin 80 mg nightly; rivaroxaban 20 mg daily; and torsemide 20 mg daily. PHYSICAL EXAMINATION:    GENERAL:  She is an elderly female in no acute distress. VITAL SIGNS:  Her performance status is 2. Her weight is 153 pounds. Blood pressure is 96/61, pulse 69, respiratory rate is 20, temperature is 98.0. HEENT:  Shows very early regrowth of her hair. Conjunctivae are pink, sclerae anicteric. Pharynx without lesions. LYMPHATICS:  She has no cervical, supraclavicular, or axillary adenopathy. LUNGS:  Reasonably good air entry to the bases, with no crackles, no rhonchi. HEART:  Normal.  ABDOMEN:  No hepatosplenomegaly or tenderness. She has 1+ edema in both lower extremities. LABORATORY DATA:  White count is 4.4, hemoglobin 10.7, platelets are 375. BUN and creatinine are 12 and 0.71. Potassium is 4.2. Liver enzymes and LDH are normal.    IMPRESSION:  Diffuse large B-cell lymphoma. She had 5 of 6 planned cycles of R-CHOP. She went into complete response by PET scan after 4 cycles. She has now had a second interval PET scan, which shows continued response. This is a little over 3 months from the previous one. For now, our plan is just to follow her expectantly. She needs careful tweaking of her medicines per Dr. Tiffany Clayton. She is seeing him in the next week or two since her fluid overload and congestive heart failure appear to be a bigger issue. Her ejection fraction is preserved, despite the exposure to Adriamycin I will see her back again, this time in about 8 weeks, with repeat labs. She is agreeable. Dictated By Maricruz Walter M.D.  d: 12/05/2022 10:21:14  t: 12/05/2022 19:33:18  Job [id number]  UN/    cc:  KALIA Sinha M.D.
No
Yes...

## 2024-01-14 NOTE — BH PATIENT PROFILE - NSTRANSFERBELONGINGSDISPO_PSY_ALL_CORE
Quality 226: Preventive Care And Screening: Tobacco Use: Screening And Cessation Intervention: Patient screened for tobacco use and is an ex/non-smoker Quality 130: Documentation Of Current Medications In The Medical Record: Current Medications Documented Detail Level: Detailed Quality 402: Tobacco Use And Help With Quitting Among Adolescents: Patient screened for tobacco and never smoked with patient/kept in storage on unit

## 2024-01-14 NOTE — BH INPATIENT PSYCHIATRY ASSESSMENT NOTE - HPI (INCLUDE ILLNESS QUALITY, SEVERITY, DURATION, TIMING, CONTEXT, MODIFYING FACTORS, ASSOCIATED SIGNS AND SYMPTOMS)
suicidal ideation  "I've been very depressed"  The patient is a 43-year-old man,, single with no significant PMH and with PPH of schizoaffective disorder, substance use disorder (alcohol, cannabis, cocaine), substance induced psychosis, PTSD, multiple prior hospitalizations (most recently to [facility] in 11/2023), reported history of suicide attempts, prior legal history of drug-related charges, who was BIB self after trying to hang himself with a belt from command auditory hallucinations.      The patient presents calm, cooperative, dysphoric-appearing, linear, answering questions appropriately.     The patient states that for the past several days, he has been feeling increasingly depressed. He has not been eating as much, isolating himself from others. He cites financial issues and not having a job and not having support from others as big stressors in his life. On the day of presentation, he was having command auditory hallucinations telling him to kill himself and therefore he proceeded to grab a belt and wrapped it around his neck to try and hang himself. His mother was there and stopped him from going through with it. She told him that he should tell her when he’s not feeling well instead of going through with it. He decided to then bring himself into the ED for help. He currently denies SI, plan, or intent but is worried about what would happen if he were to leave the hospital. He denies any HI.     The patient takes Haldol 10mg daily as his only medication. He last saw his outpatient psychiatrist at [facility] 2 months ago, has not had subsequent visits.     The patient was most recently admitted psychiatrically in 11/2023 to [facility] for similar presentation.         \on 01/14/24. chart reviewed  , discussed with staff and patient evaluated with staff. is COVID positive.  patient was cooperative. taking his medications.   reports that  he hears voices  telling to  hang himself.   denies  active  suicidal ideations intent or plan.  He reports that he feels safe  and needs help.   reports feeling depress.      suicidal ideation  "I've been very depressed"  The patient is a 43-year-old man,, single with no significant PMH and with PPH of schizoaffective disorder, substance use disorder (alcohol, cannabis, cocaine), substance induced psychosis, PTSD, multiple prior hospitalizations (most recently to [facility] in 11/2023), reported history of suicide attempts, prior legal history of drug-related charges, who was BIB self after trying to hang himself with a belt from command auditory hallucinations.      The patient presents calm, cooperative, dysphoric-appearing, linear, answering questions appropriately.     The patient states that for the past several days, he has been feeling increasingly depressed. He has not been eating as much, isolating himself from others. He cites financial issues and not having a job and not having support from others as big stressors in his life. On the day of presentation, he was having command auditory hallucinations telling him to kill himself and therefore he proceeded to grab a belt and wrapped it around his neck to try and hang himself. His mother was there and stopped him from going through with it. She told him that he should tell her when he’s not feeling well instead of going through with it. He decided to then bring himself into the ED for help. He currently denies SI, plan, or intent but is worried about what would happen if he were to leave the hospital. He denies any HI.     The patient takes Haldol 10mg daily as his only medication. He last saw his outpatient psychiatrist at [facility] 2 months ago, has not had subsequent visits.     The patient was most recently admitted psychiatrically in 11/2023 to [facility] for similar presentation.         \on 01/14/24. chart reviewed  , discussed with staff and patient evaluated with staff. is COVID positive.  patient was cooperative. taking his medications.   reports that  he hears voices  telling to  hang himself.   denies  active  suicidal ideations intent or plan.  He reports that he feels safe  and needs help.   reports feeling depress.

## 2024-01-14 NOTE — BH INPATIENT PSYCHIATRY ASSESSMENT NOTE - NSBHASSESSSUMMFT_PSY_ALL_CORE
suicidal ideation  "I've been very depressed"  The patient is a 43-year-old man,, single with no significant PMH and with PPH of schizoaffective disorder, substance use disorder (alcohol, cannabis, cocaine), substance induced psychosis, PTSD, multiple prior hospitalizations (most recently to [facility] in 11/2023), reported history of suicide attempts, prior legal history of drug-related charges, who was BIB self after trying to hang himself with a belt from command auditory hallucinations.      The patient presents calm, cooperative, dysphoric-appearing, linear, answering questions appropriately.     The patient states that for the past several days, he has been feeling increasingly depressed. He has not been eating as much, isolating himself from others. He cites financial issues and not having a job and not having support from others as big stressors in his life. On the day of presentation, he was having command auditory hallucinations telling him to kill himself and therefore he proceeded to grab a belt and wrapped it around his neck to try and hang himself. His mother was there and stopped him from going through with it. She told him that he should tell her when he’s not feeling well instead of going through with it. He decided to then bring himself into the ED for help. He currently denies SI, plan, or intent but is worried about what would happen if he were to leave the hospital. He denies any HI.     The patient takes Haldol 10mg daily as his only medication. He last saw his outpatient psychiatrist at [facility] 2 months ago, has not had subsequent visits.     The patient was most recently admitted psychiatrically in 11/2023 to [facility] for similar presentation.    \on 01/14/24. chart reviewed  , discussed with staff and patient evaluated with staff. is COVID positive.  patient was cooperative. taking his medications.   reports that  he hears voices  telling to  hang himself.   denies  active  suicidal ideations intent or plan.  He reports that he feels safe  and needs help.   reports feeling depress.             suicidal ideation  "I've been very depressed"  The patient is a 43-year-old man,, single with no significant PMH and with PPH of schizoaffective disorder, substance use disorder (alcohol, cannabis, cocaine), substance induced psychosis, PTSD, multiple prior hospitalizations (most recently to [facility] in 11/2023), reported history of suicide attempts, prior legal history of drug-related charges, who was BIB self after trying to hang himself with a belt from command auditory hallucinations.      The patient presents calm, cooperative, dysphoric-appearing, linear, answering questions appropriately.     The patient states that for the past several days, he has been feeling increasingly depressed. He has not been eating as much, isolating himself from others. He cites financial issues and not having a job and not having support from others as big stressors in his life. On the day of presentation, he was having command auditory hallucinations telling him to kill himself and therefore he proceeded to grab a belt and wrapped it around his neck to try and hang himself. His mother was there and stopped him from going through with it. She told him that he should tell her when he’s not feeling well instead of going through with it. He decided to then bring himself into the ED for help. He currently denies SI, plan, or intent but is worried about what would happen if he were to leave the hospital. He denies any HI.     The patient takes Haldol 10mg daily as his only medication. He last saw his outpatient psychiatrist at [facility] 2 months ago, has not had subsequent visits.     The patient was most recently admitted psychiatrically in 11/2023 to [facility] for similar presentation.    \on 01/14/24. chart reviewed  , discussed with staff and patient evaluated with staff. is COVID positive.  patient was cooperative. taking his medications.   reports that  he hears voices  telling to  hang himself.   denies  active  suicidal ideations intent or plan.  He reports that he feels safe  and needs help.   reports feeling depress.

## 2024-01-14 NOTE — ED BEHAVIORAL HEALTH PROGRESS NOTE - PSYCHIATRIC ISSUES AND PLAN (INCLUDE STANDING AND PRN MEDICATION)
SI and psychotic symptoms. Either increase haldol or switch to an alternate antipsychotic. 
SI and psychotic symptoms. Either increase haldol or switch to an alternate antipsychotic.

## 2024-01-14 NOTE — ED CDU PROVIDER SUBSEQUENT DAY NOTE - PROGRESS NOTE DETAILS
patient signed out from yasmin Foy , patient comfortable, awaiting single bed , + covid for admission for inpatient psychiatry spoke to dr. mayfield psychiatry states admit patient to [facility] under dr. epps . spoke to dr. mayfield psychiatry states admit patient to [facility] under dr. epps .

## 2024-01-14 NOTE — BH PATIENT PROFILE - FALL HARM RISK - UNIVERSAL INTERVENTIONS
Bed in lowest position, wheels locked, appropriate side rails in place/Call bell, personal items and telephone in reach/Instruct patient to call for assistance before getting out of bed or chair/Non-slip footwear when patient is out of bed/[place] to call system/Physically safe environment - no spills, clutter or unnecessary equipment/Purposeful Proactive Rounding/Room/bathroom lighting operational, light cord in reach Bed in lowest position, wheels locked, appropriate side rails in place/Call bell, personal items and telephone in reach/Instruct patient to call for assistance before getting out of bed or chair/Non-slip footwear when patient is out of bed/[place] to call system/Physically safe environment - no spills, clutter or unnecessary equipment/Purposeful Proactive Rounding/Room/bathroom lighting operational, light cord in reach

## 2024-01-14 NOTE — BH INPATIENT PSYCHIATRY ASSESSMENT NOTE - OTHER PAST PSYCHIATRIC HISTORY (INCLUDE DETAILS REGARDING ONSET, COURSE OF ILLNESS, INPATIENT/OUTPATIENT TREATMENT)
Diagnoses: schizoaffective disorder, r/o depression, r/o schizophrenia, substance-induced mood/psychotic disorder, anxiety     Inpatient: 20+ hospitalizations, first in 2008     Outpatient: [facility], last saw psychiatrist 2 months ago     SA: today tried hang self with belt, 6 months ago tried to hang self     NSSI: none Diagnoses: schizoaffective disorder, r/o depression, r/o schizophrenia, substance-induced mood/psychotic disorder, anxiety     Inpatient: 20+ hospitalizations, first in 2008     Outpatient: [facility], last saw psychiatrist 2 months ago     SA: today tried hang self with belt, 6 months ago tried to hang self     NSSI: none
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
## 2024-01-14 NOTE — BH INPATIENT PSYCHIATRY ASSESSMENT NOTE - NSBHMETABOLIC_PSY_ALL_CORE_FT
BMI: BMI (kg/m2): 24.6 (01-14-24 @ 12:21)  HbA1c: A1C with Estimated Average Glucose Result: 5.9 % (07-11-23 @ 07:58)    Glucose: POCT Blood Glucose.: 128 mg/dL (01-13-24 @ 04:49)    BP: 105/70 (01-14-24 @ 12:21) (105/70 - 138/82)Vital Signs Last 24 Hrs  T(C): 36.7 (01-14-24 @ 12:21), Max: 36.9 (01-14-24 @ 09:04)  T(F): 98.1 (01-14-24 @ 12:21), Max: 98.5 (01-14-24 @ 09:04)  HR: 83 (01-14-24 @ 12:21) (80 - 83)  BP: 105/70 (01-14-24 @ 12:21) (105/70 - 107/57)  BP(mean): --  RR: 17 (01-14-24 @ 12:21) (16 - 17)  SpO2: 98% (01-14-24 @ 09:04) (98% - 98%)      Lipid Panel: Date/Time: 07-10-23 @ 10:25  Cholesterol, Serum: 105  LDL Cholesterol Calculated: 47  HDL Cholesterol, Serum: 30  Total Cholesterol/HDL Ration Measurement: --  Triglycerides, Serum: 141

## 2024-01-14 NOTE — BH INPATIENT PSYCHIATRY ASSESSMENT NOTE - NSBHCHARTREVIEWVS_PSY_A_CORE FT
Vital Signs Last 24 Hrs  T(C): 36.7 (01-14-24 @ 12:21), Max: 36.9 (01-14-24 @ 09:04)  T(F): 98.1 (01-14-24 @ 12:21), Max: 98.5 (01-14-24 @ 09:04)  HR: 83 (01-14-24 @ 12:21) (80 - 83)  BP: 105/70 (01-14-24 @ 12:21) (105/70 - 107/57)  BP(mean): --  RR: 17 (01-14-24 @ 12:21) (16 - 17)  SpO2: 98% (01-14-24 @ 09:04) (98% - 98%)

## 2024-01-14 NOTE — BH SOCIAL WORK INITIAL PSYCHOSOCIAL EVALUATION - NSBHTREATHXNAMEFT_PSY_ALL_CORE
[facility] [place], [place], operated by [facility]
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
## 2024-01-14 NOTE — ED BEHAVIORAL HEALTH PROGRESS NOTE - CASE SUMMARY/FORMULATION (CLEARLY DOCUMENT RATIONALE FOR DISPOSITION CHANGE)
The patient is a 43-year-old man,, single with no significant PMH and with PPH of schizoaffective disorder, substance use disorder (alcohol, cannabis, cocaine), substance induced psychosis, PTSD, multiple prior hospitalizations (most recently to [facility] in 11/2023), reported history of suicide attempts, prior legal history of drug-related charges, who was BIB self after trying to hang himself with a belt from command auditory hallucinations.      The patient presents with depressed mood, anhedonia, and aborted suicide attempt via hanging self with a belt, in the context of financial stressors and feelings of hopelessness. The patient has history of multiple prior hospitalizations and ED visits, is a poor utilizer of outpatient services, and has history of substance abuse. He does present with acute safety concerns of suicide attempt and inability to safety plan. As a result, he meets criteria for voluntary psychiatric admission to stabilize symptoms and to optimize medication regimen. The patient is NOT psychiatrically cleared for discharge.     #Schizoaffective disorder #Auditory hallucinations  - Admit 9.13  - Continue Haldol 10 mg daily  - No constant observation needed on unit    #COVID  - Tested positive on 01/13  - Asymptomatic presentation  - Isolation precautions    #Elevated Trops and CK  - Asymptomatic presentation  - Cardiac workup in ED (repeat troponins, echo, stress test) WNL   - Obtain Utox as patient has hx of substance use; cocaine or another stimulant may have contributed to the elevated CK and troponin levels    #Agitation  - For episodes of acute agitation can offer PO Haldol 5 mg/Ativan 2 mg/Benadryl 50 mg Q6H PRN. If refusing PO and is in acute risk of harm to self/other, can offer IM Haldol 5 mg/Ativan 2 mg/Benadryl 50 mg Q6H PRN. If given, please repeat EKG and hold antipsychotics if QTC>500      The patient is a 43-year-old man,, single with no significant PMH and with PPH of schizoaffective disorder, substance use disorder (alcohol, cannabis, cocaine), substance induced psychosis, PTSD, multiple prior hospitalizations (most recently to [facility] in 11/2023), reported history of suicide attempts, prior legal history of drug-related charges, who was BIB self after trying to hang himself with a belt from command auditory hallucinations.      The patient presents with depressed mood, anhedonia, and aborted suicide attempt via hanging self with a belt, in the context of financial stressors and feelings of hopelessness. The patient has history of multiple prior hospitalizations and ED visits, is a poor utilizer of outpatient services, and has history of substance abuse. He does present with acute safety concerns of suicide attempt and inability to safety plan. As a result, he meets criteria for voluntary psychiatric admission to stabilize symptoms and to optimize medication regimen. The patient is NOT psychiatrically cleared for discharge.     #Schizoaffective disorder #Auditory hallucinations  - Admit 9.13  - Continue Haldol 10 mg daily  - No constant observation needed on unit    #COVID  - Tested positive on 01/13  - Asymptomatic presentation  - Isolation precautions    #Elevated Trops and CK  - Asymptomatic presentation  - Cardiac workup in ED (repeat troponins, echo, stress test) WNL   - Obtain Utox as patient has hx of substance use; cocaine or another stimulant may have contributed to the elevated CK and troponin levels    #Agitation  - For episodes of acute agitation can offer PO Haldol 5 mg/Ativan 2 mg/Benadryl 50 mg Q6H PRN. If refusing PO and is in acute risk of harm to self/other, can offer IM Haldol 5 mg/Ativan 2 mg/Benadryl 50 mg Q6H PRN. If given, please repeat EKG and hold antipsychotics if QTC>500

## 2024-01-14 NOTE — BH INPATIENT PSYCHIATRY ASSESSMENT NOTE - DESCRIPTION
lives with mother in apartment, Born on [place] and raised by Maternal uncle. He reports that he has no siblings.  He reports completing an 11th grade education. He supports himself with SSI.  He reports that he has one 18-year-old son with whom he has no contact.  He identifies as Religious. lives with mother in apartment, Born on [place] and raised by Maternal uncle. He reports that he has no siblings.  He reports completing an 11th grade education. He supports himself with SSI.  He reports that he has one 18-year-old son with whom he has no contact.  He identifies as Adventist.

## 2024-01-14 NOTE — ED CDU PROVIDER SUBSEQUENT DAY NOTE - CLINICAL SUMMARY MEDICAL DECISION MAKING FREE TEXT BOX
42yo man h/o schizoaffective d/o/schizophrenia was placed in CDU for syncope workup as well as psych evaluation after he presented with syncope followed by attempt to hang  himself with a belt. ED workup was significant for mildly elevated troponin which decreased on second trop; EKG nonischemic. Pt monitored without incident and is awaiting echo. Pt was evaluated by psych and plan is for voluntary admission once echo is completed and pt is medically cleared. VS, exam as noted, pt calm and cooperative on my eval tho he does complain that he is hearing voices. Will continue to observe. 44yo man h/o schizoaffective d/o/schizophrenia was placed in CDU for syncope workup as well as psych evaluation after he presented with syncope followed by attempt to hang  himself with a belt. ED workup was significant for mildly elevated troponin which decreased on second trop; EKG nonischemic. Pt monitored without incident and is awaiting echo. Pt was evaluated by psych and plan is for voluntary admission once echo is completed and pt is medically cleared. VS, exam as noted, pt calm and cooperative on my eval tho he does complain that he is hearing voices. Will continue to observe.

## 2024-01-14 NOTE — ED BEHAVIORAL HEALTH NOTE - BEHAVIORAL HEALTH NOTE
NOTE:     ================     Re-assessment Note     ================     SOURCE:  RN and secondhand ED documentation.     BEHAVIOR: Per RN pt has been calm, and in behavioral control. RN reports pt is frequently found masturbating, pt redirected and told this behavior is not appropriate. Pt is currently sleeping. RN reports pt is refusing vitals. RN denies visitors overnight and denies medications given or required. RN reports pt continues to be in a gown and with a 1:1.

## 2024-01-14 NOTE — BH INPATIENT PSYCHIATRY ASSESSMENT NOTE - NSBHATTESTBILLING_PSY_A_CORE
[id number] diagnostic evaluation without medical services [id number] diagnostic evaluation without medical services

## 2024-01-14 NOTE — BH INPATIENT PSYCHIATRY ASSESSMENT NOTE - CURRENT MEDICATION
MEDICATIONS  (STANDING):  haloperidol     Tablet 10 milliGRAM(s) Oral daily    MEDICATIONS  (PRN):  acetaminophen     Tablet .. 650 milliGRAM(s) Oral every 6 hours PRN Temp greater or equal to 38C (100.4F), Mild Pain (1 - 3), Moderate Pain (4 - 6)  aluminum hydroxide/magnesium hydroxide/simethicone Suspension 30 milliLiter(s) Oral every 6 hours PRN Dyspepsia  diphenhydrAMINE 50 milliGRAM(s) Oral every 6 hours PRN Extrapyramidal prophylaxis  haloperidol     Tablet 5 milliGRAM(s) Oral every 6 hours PRN Agitation  hydrOXYzine hydrochloride 50 milliGRAM(s) Oral every 12 hours PRN Anxiety  LORazepam     Tablet 2 milliGRAM(s) Oral every 6 hours PRN Severe anxiety  melatonin 5 milliGRAM(s) Oral at bedtime PRN Insomnia

## 2024-01-14 NOTE — ED BEHAVIORAL HEALTH PROGRESS NOTE - RISK ASSESSMENT
Risk: prior hospitalizations, prior SA, poor treatment adherence, single, poor social support, history of substance use, unemployed  Protective: help-seeking behavior, some family support and stable housing
Risk: prior hospitalizations, prior SA, poor treatment adherence, single, poor social support, history of substance use, unemployed, current level of irritability  Protective: help-seeking behavior, some family support and housing

## 2024-01-14 NOTE — BH INPATIENT PSYCHIATRY ASSESSMENT NOTE - LEGAL HISTORY
hx of arrest for fighting, drug charges.  Reports spending 1.5 years in residential for drug charges. hx of arrest for fighting, drug charges.  Reports spending 1.5 years in skilled nursing for drug charges.

## 2024-01-15 LAB
A1C WITH ESTIMATED AVERAGE GLUCOSE RESULT: 5.1 % — SIGNIFICANT CHANGE UP (ref 4–5.6)
A1C WITH ESTIMATED AVERAGE GLUCOSE RESULT: 5.1 % — SIGNIFICANT CHANGE UP (ref 4–5.6)
CHOLEST SERPL-MCNC: 110 MG/DL — SIGNIFICANT CHANGE UP
CHOLEST SERPL-MCNC: 110 MG/DL — SIGNIFICANT CHANGE UP
ESTIMATED AVERAGE GLUCOSE: 100 MG/DL — SIGNIFICANT CHANGE UP (ref 68–114)
ESTIMATED AVERAGE GLUCOSE: 100 MG/DL — SIGNIFICANT CHANGE UP (ref 68–114)
HDLC SERPL-MCNC: 40 MG/DL — LOW
HDLC SERPL-MCNC: 40 MG/DL — LOW
LIPID PNL WITH DIRECT LDL SERPL: 39 MG/DL — SIGNIFICANT CHANGE UP
LIPID PNL WITH DIRECT LDL SERPL: 39 MG/DL — SIGNIFICANT CHANGE UP
MAGNESIUM SERPL-MCNC: 1.9 MG/DL — SIGNIFICANT CHANGE UP (ref 1.8–2.4)
MAGNESIUM SERPL-MCNC: 1.9 MG/DL — SIGNIFICANT CHANGE UP (ref 1.8–2.4)
NON HDL CHOLESTEROL: 70 MG/DL — SIGNIFICANT CHANGE UP
NON HDL CHOLESTEROL: 70 MG/DL — SIGNIFICANT CHANGE UP
TRIGL SERPL-MCNC: 154 MG/DL — HIGH
TRIGL SERPL-MCNC: 154 MG/DL — HIGH
TSH SERPL-MCNC: 1.09 UIU/ML — SIGNIFICANT CHANGE UP (ref 0.27–4.2)

## 2024-01-15 PROCEDURE — 99231 SBSQ HOSP IP/OBS SF/LOW 25: CPT

## 2024-01-15 PROCEDURE — 99233 SBSQ HOSP IP/OBS HIGH 50: CPT

## 2024-01-15 RX ADMIN — HALOPERIDOL DECANOATE 5 MILLIGRAM(S): 100 INJECTION INTRAMUSCULAR at 11:03

## 2024-01-15 RX ADMIN — HALOPERIDOL DECANOATE 10 MILLIGRAM(S): 100 INJECTION INTRAMUSCULAR at 20:50

## 2024-01-15 NOTE — BH INPATIENT PSYCHIATRY PROGRESS NOTE - NSBHFUPINTERVALHXFT_PSY_A_CORE
Patient was seen and evaluated. Chart reviewed. No overnight events.  On evaluation, pt reports he is "hearing voices" telling him to "kill himself." Pt reports he is finding them distressing. Pt then asks the writer if he can "go to a State hospital." Pt agreed to take a PRN of Haldol at this time. Denied medication side effects. Reports fair sleep and "ok" appetite. Denied visual hallucinations. Denied wanting to act on the voices. Contracts for safety on the unit. Does not want to harm himself but finds voices distressing.

## 2024-01-15 NOTE — BH INPATIENT PSYCHIATRY PROGRESS NOTE - NSBHASSESSSUMMFT_PSY_ALL_CORE
The patient is a 43-year-old man,, single with no significant PMH and with PPH of schizoaffective disorder, substance use disorder (alcohol, cannabis, cocaine), substance induced psychosis, PTSD, multiple prior hospitalizations (most recently to [facility] in 11/2023), reported history of suicide attempts, prior legal history of drug-related charges, who was BIB self after trying to hang himself with a belt from command auditory hallucinations        The patient is a 43-year-old man,, single with no significant PMH and with PPH of schizoaffective disorder, substance use disorder (alcohol, cannabis, cocaine), substance induced psychosis, PTSD, multiple prior hospitalizations (most recently to [facility] in 11/2023), reported history of suicide attempts, prior legal history of drug-related charges, who was BIB self after trying to hang himself with a belt from command auditory hallucinations        The patient is a 43-year-old man,, single with no significant PMH and with PPH of schizoaffective disorder, substance use disorder (alcohol, cannabis, cocaine), substance induced psychosis, PTSD, multiple prior hospitalizations (most recently to [facility] in 11/2023), reported history of suicide attempts, prior legal history of drug-related charges, who was BIB self after trying to hang himself with a belt from command auditory hallucinations

## 2024-01-15 NOTE — BH INPATIENT PSYCHIATRY PROGRESS NOTE - NSBHMETABOLIC_PSY_ALL_CORE_FT
BMI: BMI (kg/m2): 24.6 (01-14-24 @ 12:21)  HbA1c: A1C with Estimated Average Glucose Result: 5.1 % (01-15-24 @ 08:34)    Glucose: POCT Blood Glucose.: 128 mg/dL (01-13-24 @ 04:49)    BP: 107/67 (01-15-24 @ 07:50) (105/70 - 138/82)Vital Signs Last 24 Hrs  T(C): 35.4 (01-15-24 @ 07:50), Max: 37 (01-14-24 @ 16:47)  T(F): 95.8 (01-15-24 @ 07:50), Max: 98.6 (01-14-24 @ 16:47)  HR: 57 (01-15-24 @ 07:50) (57 - 75)  BP: 107/67 (01-15-24 @ 07:50) (107/67 - 111/56)  BP(mean): --  RR: 18 (01-15-24 @ 07:50) (16 - 18)  SpO2: --      Lipid Panel: Date/Time: 01-15-24 @ 08:34  Cholesterol, Serum: 110  LDL Cholesterol Calculated: 39  HDL Cholesterol, Serum: 40  Total Cholesterol/HDL Ration Measurement: --  Triglycerides, Serum: 154

## 2024-01-15 NOTE — CONSULT NOTE ADULT - SUBJECTIVE AND OBJECTIVE BOX
Patient is a 43y old  Male who presents with a chief complaint of     MEDICATIONS  (STANDING):  haloperidol     Tablet 10 milliGRAM(s) Oral daily    MEDICATIONS  (PRN):  acetaminophen     Tablet .. 650 milliGRAM(s) Oral every 6 hours PRN Temp greater or equal to 38C (100.4F), Mild Pain (1 - 3), Moderate Pain (4 - 6)  aluminum hydroxide/magnesium hydroxide/simethicone Suspension 30 milliLiter(s) Oral every 6 hours PRN Dyspepsia  diphenhydrAMINE 50 milliGRAM(s) Oral every 6 hours PRN Extrapyramidal prophylaxis  haloperidol     Tablet 5 milliGRAM(s) Oral every 6 hours PRN Agitation  hydrOXYzine hydrochloride 50 milliGRAM(s) Oral every 12 hours PRN Anxiety  LORazepam     Tablet 2 milliGRAM(s) Oral every 6 hours PRN Severe anxiety  melatonin 5 milliGRAM(s) Oral at bedtime PRN Insomnia      CAPILLARY BLOOD GLUCOSE  I&O's Summary      PHYSICAL EXAM:  Vital Signs Last 24 Hrs  T(C): 35.4 (15 Fred 2024 07:50), Max: 37 (14 Jan 2024 16:47)  T(F): 95.8 (15 Fred 2024 07:50), Max: 98.6 (14 Jan 2024 16:47)  HR: 57 (15 Fred 2024 07:50) (57 - 83)  BP: 107/67 (15 Fred 2024 07:50) (105/70 - 111/56)  BP(mean): --  RR: 18 (15 Fred 2024 07:50) (16 - 18)  SpO2: --      GENERAL: No acute distress, well-developed      Mg     1.9     01-15

## 2024-01-15 NOTE — BH INPATIENT PSYCHIATRY PROGRESS NOTE - NSBHATTESTBILLING_PSY_A_CORE
[id number]-Bxurnnbvum OBS or IP - moderate complexity OR 35-49 mins [id number]-Lqypfhftam OBS or IP - moderate complexity OR 35-49 mins [id number]-Ovatywmtrz OBS or IP - moderate complexity OR 35-49 mins

## 2024-01-15 NOTE — BH INPATIENT PSYCHIATRY PROGRESS NOTE - PRN MEDS
MEDICATIONS  (PRN):  acetaminophen     Tablet .. 650 milliGRAM(s) Oral every 6 hours PRN Temp greater or equal to 38C (100.4F), Mild Pain (1 - 3), Moderate Pain (4 - 6)  aluminum hydroxide/magnesium hydroxide/simethicone Suspension 30 milliLiter(s) Oral every 6 hours PRN Dyspepsia  diphenhydrAMINE 50 milliGRAM(s) Oral every 6 hours PRN Extrapyramidal prophylaxis  haloperidol     Tablet 5 milliGRAM(s) Oral every 6 hours PRN Agitation  hydrOXYzine hydrochloride 50 milliGRAM(s) Oral every 12 hours PRN Anxiety  LORazepam     Tablet 2 milliGRAM(s) Oral every 6 hours PRN Severe anxiety  melatonin 5 milliGRAM(s) Oral at bedtime PRN Insomnia

## 2024-01-15 NOTE — BH INPATIENT PSYCHIATRY PROGRESS NOTE - NSBHCHARTREVIEWVS_PSY_A_CORE FT
Vital Signs Last 24 Hrs  T(C): 35.4 (01-15-24 @ 07:50), Max: 37 (01-14-24 @ 16:47)  T(F): 95.8 (01-15-24 @ 07:50), Max: 98.6 (01-14-24 @ 16:47)  HR: 57 (01-15-24 @ 07:50) (57 - 75)  BP: 107/67 (01-15-24 @ 07:50) (107/67 - 111/56)  BP(mean): --  RR: 18 (01-15-24 @ 07:50) (16 - 18)  SpO2: --

## 2024-01-16 PROCEDURE — 99231 SBSQ HOSP IP/OBS SF/LOW 25: CPT

## 2024-01-16 RX ORDER — HALOPERIDOL DECANOATE 100 MG/ML
10 INJECTION INTRAMUSCULAR AT BEDTIME
Refills: 0 | Status: DISCONTINUED | OUTPATIENT
Start: 2024-01-16 | End: 2024-01-18

## 2024-01-16 RX ORDER — HALOPERIDOL DECANOATE 100 MG/ML
5 INJECTION INTRAMUSCULAR DAILY
Refills: 0 | Status: DISCONTINUED | OUTPATIENT
Start: 2024-01-16 | End: 2024-01-18

## 2024-01-16 RX ORDER — HYDROXYZINE HCL 10 MG
50 TABLET ORAL EVERY 6 HOURS
Refills: 0 | Status: DISCONTINUED | OUTPATIENT
Start: 2024-01-16 | End: 2024-02-07

## 2024-01-16 RX ORDER — NICOTINE POLACRILEX 2 MG
2 GUM BUCCAL
Refills: 0 | Status: DISCONTINUED | OUTPATIENT
Start: 2024-01-16 | End: 2024-02-07

## 2024-01-16 RX ORDER — BENZTROPINE MESYLATE 1 MG
0.5 TABLET ORAL
Refills: 0 | Status: DISCONTINUED | OUTPATIENT
Start: 2024-01-16 | End: 2024-01-25

## 2024-01-16 RX ADMIN — Medication 0.5 MILLIGRAM(S): at 20:10

## 2024-01-16 RX ADMIN — HALOPERIDOL DECANOATE 10 MILLIGRAM(S): 100 INJECTION INTRAMUSCULAR at 20:11

## 2024-01-16 RX ADMIN — HALOPERIDOL DECANOATE 5 MILLIGRAM(S): 100 INJECTION INTRAMUSCULAR at 12:44

## 2024-01-16 NOTE — BH INPATIENT PSYCHIATRY PROGRESS NOTE - PRN MEDS
MEDICATIONS  (PRN):  acetaminophen     Tablet .. 650 milliGRAM(s) Oral every 6 hours PRN Temp greater or equal to 38C (100.4F), Mild Pain (1 - 3), Moderate Pain (4 - 6)  aluminum hydroxide/magnesium hydroxide/simethicone Suspension 30 milliLiter(s) Oral every 6 hours PRN Dyspepsia  diphenhydrAMINE 50 milliGRAM(s) Oral every 6 hours PRN Extrapyramidal prophylaxis  haloperidol     Tablet 5 milliGRAM(s) Oral every 6 hours PRN Agitation  hydrOXYzine hydrochloride 50 milliGRAM(s) Oral every 12 hours PRN Anxiety  LORazepam     Tablet 2 milliGRAM(s) Oral every 6 hours PRN Severe anxiety  melatonin 5 milliGRAM(s) Oral at bedtime PRN Insomnia   MEDICATIONS  (PRN):  acetaminophen     Tablet .. 650 milliGRAM(s) Oral every 6 hours PRN Temp greater or equal to 38C (100.4F), Mild Pain (1 - 3), Moderate Pain (4 - 6)  aluminum hydroxide/magnesium hydroxide/simethicone Suspension 30 milliLiter(s) Oral every 6 hours PRN Dyspepsia  diphenhydrAMINE 50 milliGRAM(s) Oral every 6 hours PRN Extrapyramidal prophylaxis  haloperidol     Tablet 5 milliGRAM(s) Oral every 6 hours PRN Agitation  hydrOXYzine hydrochloride 50 milliGRAM(s) Oral every 6 hours PRN anxiety/insomnia  LORazepam     Tablet 2 milliGRAM(s) Oral every 6 hours PRN physical aggression  melatonin 5 milliGRAM(s) Oral at bedtime PRN Insomnia  nicotine  Polacrilex Gum 2 milliGRAM(s) Oral every 2 hours PRN smoking cessation

## 2024-01-16 NOTE — UM REPORT PROGRESS NOTE - NSUMRMPROVIDER_GEN_A_CORE FT
Patient: Lauren Reeder   : 1980  INSURANCE: [employer]   ID# [id number]  [phone]      Spoke to [facility] # [id number]. Patient has been approved from -, review on . No Cm is assigned, please call the main line or fax in clinicals for additional days.  Patient: Lauren Reeder   : 1980  INSURANCE: [employer]   ID# [id number]  [phone]      Spoke to [facility] # [id number]. Patient has been approved from -, review on . No Cm is assigned, please call the main line or fax in clinicals for additional days.  Patient: Lauren Reeder   : 1980  INSURANCE: [employer]   ID# [id number]  [phone]      Spoke to [facility] # [id number]. Patient has been approved from -, review on . No Cm is assigned, please call the main line or fax in clinicals for additional days.    2024 Clinicals sent requesting extra stay awaiting determination   Patient: Lauren Reeder   : 1980  INSURANCE: [employer]   ID# [id number]  [phone]      Spoke to [facility] # [id number]. Patient has been approved from -, review on . No Cm is assigned, please call the main line or fax in clinicals for additional days.    2024 Clinicals sent requesting extra stay awaiting determination   Patient: Lauren Reeder   : 1980  INSURANCE: [employer]   ID# [id number]  [phone]      Spoke to [facility] # [id number]. Patient has been approved from -, review on . No Cm is assigned, please call the main line or fax in clinicals for additional days.    2024 Clinicals sent requesting extra stay awaiting determination  member approved for 5 additional days of coverage. with review do on the   Patient: Lauren Reeder   : 1980  INSURANCE: [employer]   ID# [id number]  [phone]      Spoke to [facility] # [id number]. Patient has been approved from -, review on . No Cm is assigned, please call the main line or fax in clinicals for additional days.    2024 Clinicals sent requesting extra stay awaiting determination  member approved for 5 additional days of coverage. with review do on the   Patient: Lauren Reeder   : 1980  INSURANCE: [employer]   ID# [id number]  [phone]      Spoke to [facility] # [id number]. Patient has been approved from -, review on . No Cm is assigned, please call the main line or fax in clinicals for additional days.    2024 Clinicals sent requesting extra stay awaiting determination  member approved for 5 additional days of coverage. with review do on the  review conducted 3 additional days given with review on 24 Patient: Lauren Reeder   : 1980  INSURANCE: [employer]   ID# [id number]  [phone]      Spoke to [facility] # [id number]. Patient has been approved from -, review on . No Cm is assigned, please call the main line or fax in clinicals for additional days.    2024 Clinicals sent requesting extra stay awaiting determination  member approved for 5 additional days of coverage. with review do on the  review conducted 3 additional days given with review on 24 Patient: Lauren Reeder   : 1980  INSURANCE: [employer]   ID# [id number]  [phone]      Spoke to [facility] # [id number]. Patient has been approved from -, review on . No Cm is assigned, please call the main line or fax in clinicals for additional days.    2024 Clinicals sent requesting extra stay awaiting determination  member approved for 5 additional days of coverage. with review do on the  review conducted 3 additional days given with review on 24 LUCRETIA Oreilly sent in clinicals for review awaiting determination. Patient: Lauren Reeder   : 1980  INSURANCE: [employer]   ID# [id number]  [phone]      Spoke to [facility] # [id number]. Patient has been approved from -, review on . No Cm is assigned, please call the main line or fax in clinicals for additional days.    2024 Clinicals sent requesting extra stay awaiting determination  member approved for 5 additional days of coverage. with review do on the  review conducted 3 additional days given with review on 24 LUCRETIA Oreilly sent in clinicals for review awaiting determination. Patient: Lauren Reeder   : 1980  INSURANCE: [employer]   ID# [id number]  [phone]      Spoke to [facility] # [id number]. Patient has been approved from -, review on . No Cm is assigned, please call the main line or fax in clinicals for additional days.    2024 Clinicals sent requesting extra stay awaiting determination  member approved for 5 additional days of coverage. with review do on the  review conducted 3 additional days given with review on 24 LUCRETIA Oreilly sent in clinicals for review awaiting determination.   LUCRETIA sent in clinicals for review and is awaiting determination.  Patient: Lauren Reeder   : 1980  INSURANCE: [employer]   ID# [id number]  [phone]      Spoke to [facility] # [id number]. Patient has been approved from -, review on . No Cm is assigned, please call the main line or fax in clinicals for additional days.    2024 Clinicals sent requesting extra stay awaiting determination  member approved for 5 additional days of coverage. with review do on the  review conducted 3 additional days given with review on 24 LUCRETIA Oreilly sent in clinicals for review awaiting determination.   LUCRETIA sent in clinicals for review and is awaiting determination.  Patient: Lauren Reeder   : 1980  INSURANCE: [employer]   ID# [id number]  [phone]      Spoke to [facility] # [id number]. Patient has been approved from -, review on . No Cm is assigned, please call the main line or fax in clinicals for additional days.    2024 Clinicals sent requesting extra stay awaiting determination  member approved for 5 additional days of coverage. with review do on the  review conducted 3 additional days given with review on 24 LUCRETIA Oreilly sent in clinicals for review awaiting determination.   LUCRETIA sent in clinicals for review and is awaiting determination.    additional dates of coverage medical follow questions asked and were provided to CM Patient: Lauren Reeder   : 1980  INSURANCE: [employer]   ID# [id number]  [phone]      Spoke to [facility] # [id number]. Patient has been approved from -, review on . No Cm is assigned, please call the main line or fax in clinicals for additional days.    2024 Clinicals sent requesting extra stay awaiting determination  member approved for 5 additional days of coverage. with review do on the  review conducted 3 additional days given with review on 24 LUCRETIA Oreilly sent in clinicals for review awaiting determination.   LUCRETIA sent in clinicals for review and is awaiting determination.    additional dates of coverage medical follow questions asked and were provided to CM Patient: Lauren Reeder   : 1980  INSURANCE: [employer]   ID# [id number]  [phone]      Spoke to [facility] # [id number]. Patient has been approved from -, review on . No Cm is assigned, please call the main line or fax in clinicals for additional days.    2024 Clinicals sent requesting extra stay awaiting determination  member approved for 5 additional days of coverage. with review do on the  review conducted 3 additional days given with review on 24 LUCRETIA Afia sent in clinicals for review awaiting determination.   SW sent in clinicals for review and is awaiting determination.    additional dates of coverage medical follow questions asked and were provided to CM  2 SW sent in clinicals for review as today was  is last coverage date.  Patient: Lauren Reeder   : 1980  INSURANCE: [employer]   ID# [id number]  [phone]      Spoke to [facility] # [id number]. Patient has been approved from -, review on . No Cm is assigned, please call the main line or fax in clinicals for additional days.    2024 Clinicals sent requesting extra stay awaiting determination  member approved for 5 additional days of coverage. with review do on the  review conducted 3 additional days given with review on 24 LUCRETIA Afia sent in clinicals for review awaiting determination.   SW sent in clinicals for review and is awaiting determination.    additional dates of coverage medical follow questions asked and were provided to CM  2 SW sent in clinicals for review as today was  is last coverage date.  Patient: Lauren Reeder   : 1980  INSURANCE: [employer]   ID# [id number]  [phone]      Spoke to [facility] # [id number]. Patient has been approved from -, review on . No Cm is assigned, please call the main line or fax in clinicals for additional days.    2024 Clinicals sent requesting extra stay awaiting determination  member approved for 5 additional days of coverage. with review do on the  review conducted 3 additional days given with review on 24 LUCRETIA Afia sent in clinicals for review awaiting determination.   SW sent in clinicals for review and is awaiting determination.    additional dates of coverage medical follow questions asked and were provided to CM   SW sent in clinicals for review as today was  is last coverage date.  UPDATE clinicals sent LCD  with DC planned for  should DC not occur we would need to request extended stay and complete a review.  Patient: Lauren Reeder   : 1980  INSURANCE: [employer]   ID# [id number]  [phone]      Spoke to [facility] # [id number]. Patient has been approved from -, review on . No Cm is assigned, please call the main line or fax in clinicals for additional days.    2024 Clinicals sent requesting extra stay awaiting determination  member approved for 5 additional days of coverage. with review do on the  review conducted 3 additional days given with review on 24 LUCRETIA Afia sent in clinicals for review awaiting determination.   SW sent in clinicals for review and is awaiting determination.    additional dates of coverage medical follow questions asked and were provided to CM   SW sent in clinicals for review as today was  is last coverage date.  UPDATE clinicals sent LCD  with DC planned for  should DC not occur we would need to request extended stay and complete a review.

## 2024-01-16 NOTE — BH INPATIENT PSYCHIATRY PROGRESS NOTE - NSBHASSESSSUMMFT_PSY_ALL_CORE
43-year-old man,, single with no significant PMH and with PPH of schizoaffective disorder, substance use disorder (alcohol, cannabis, cocaine), substance induced psychosis, PTSD, multiple prior hospitalizations (most recently to [facility] in 11/2023), reported history of suicide attempts, prior legal history of drug-related charges, who was BIB self after trying to hang himself with a belt from command auditory hallucinations. Pt presented with depressed mood, anhedonia, and aborted suicide attempt via hanging self with a belt, in the context of financial stressors and feelings of hopelessness. The patient has history of multiple prior hospitalizations and ED visits, is a poor utilizer of outpatient services, and has history of substance abuse. Pt will likely benefit from IPP at this time.    #Schizoaffective d/o  -c/w haldol 10 mg daily    #H/o polysubstance use  -UDS pending    #Agitation  -for agitation not amenable to verbal redirection, may give haldol 5 mg q6h prn and ativan 2 mg q6h prn with escalation to IM if pt is refusing PO and is an acute danger to self or/and others with repeat EKG to ensure QTc <500 ms     43-year-old man,, single with no significant PMH and with PPH of schizoaffective disorder, substance use disorder (alcohol, cannabis, cocaine), substance induced psychosis, PTSD, multiple prior hospitalizations (most recently to [facility] in 11/2023), reported history of suicide attempts, prior legal history of drug-related charges, who was BIB self after trying to hang himself with a belt from command auditory hallucinations. Pt presented with depressed mood, anhedonia, and aborted suicide attempt via hanging self with a belt, in the context of financial stressors and feelings of hopelessness. The patient has history of multiple prior hospitalizations and ED visits, is a poor utilizer of outpatient services, and has history of substance abuse. Pt will likely benefit from IPP at this time.    #Schizoaffective d/o  -c/w haldol 10 mg daily    #H/o polysubstance use  -UDS pending    #Agitation  -for agitation not amenable to verbal redirection, may give haldol 5 mg q6h prn and ativan 2 mg q6h prn with escalation to IM if pt is refusing PO and is an acute danger to self or/and others with repeat EKG to ensure QTc <500 ms     43-year-old man,, single with no significant PMH and with PPH of schizoaffective disorder, substance use disorder (alcohol, cannabis, cocaine), substance induced psychosis, PTSD, multiple prior hospitalizations (most recently to [facility] in 11/2023), reported history of suicide attempts, prior legal history of drug-related charges, who was BIB self after trying to hang himself with a belt from command auditory hallucinations. Pt presented with depressed mood, anhedonia, and aborted suicide attempt via hanging self with a belt, in the context of financial stressors and feelings of hopelessness. The patient has history of multiple prior hospitalizations and ED visits, is a poor utilizer of outpatient services, and has history of substance abuse. Pt will likely benefit from IPP at this time.    #Schizoaffective d/o  -titrate haldol 5 mg qAM/ 10 mg qHS  -start cogentin 0.5 mg bid for eps ppx    #H/o polysubstance use  -UDS pending    #Agitation  -for agitation not amenable to verbal redirection, may give haldol 5 mg q6h prn and ativan 2 mg q6h prn with escalation to IM if pt is refusing PO and is an acute danger to self or/and others with repeat EKG to ensure QTc <500 ms     43-year-old man,, single with no significant PMH and with PPH of schizoaffective disorder, substance use disorder (alcohol, cannabis, cocaine), substance induced psychosis, PTSD, multiple prior hospitalizations (most recently to [facility] in 11/2023), reported history of suicide attempts, prior legal history of drug-related charges, who was BIB self after trying to hang himself with a belt from command auditory hallucinations. Pt presented with depressed mood, anhedonia, and aborted suicide attempt via hanging self with a belt, in the context of financial stressors and feelings of hopelessness. The patient has history of multiple prior hospitalizations and ED visits, is a poor utilizer of outpatient services, and has history of substance abuse. Pt will likely benefit from IPP at this time.    #Schizoaffective d/o  -titrate haldol 5 mg qAM/ 10 mg qHS  -start cogentin 0.5 mg bid for eps ppx    #H/o polysubstance use  -UDS pending    #Agitation  -for agitation not amenable to verbal redirection, may give haldol 5 mg q6h prn and ativan 2 mg q6h prn with escalation to IM if pt is refusing PO and is an acute danger to self or/and others with repeat EKG to ensure QTc <500 ms

## 2024-01-16 NOTE — BH INPATIENT PSYCHIATRY PROGRESS NOTE - NSBHFUPINTERVALHXFT_PSY_A_CORE
Pt seen and evaluated, chart reviewed. As per nursing report, no acute events overnight. On evaluation, pt reports he is "still hearing voices". Pt appears easily distracted, linear and cooperative. He reports AH, one male voice, inside his head, telling him to "hang myself." He reports distress on AH. He denies intent or plan to harm self, is future-oriented and inquiring on state hospitalization. Pt reports one prior state hospitalization which he stated was beneficial. He reports low mood d/t AH. He reports he was able to sleep well overnight and is with improved appetite. He denies VH. Denies his life is in danger or others are trying to harm/follow him. He denies active SI/HI, intent and plan, able to contract for safety. He denies recent use of all substances; UDS pending. He is adherent to medications, denies negative side effects. In behavioral control.  Pt seen and evaluated, chart reviewed. As per nursing report, no acute events overnight. On evaluation, pt reports he is "still hearing voices". Pt appears easily distracted, linear and cooperative. He reports AH, one male voice, inside his head, telling him to "hang myself." He reports distress on AH. He denies intent or plan to harm self, is future-oriented and inquiring on state hospitalization. Pt reports one prior state hospitalization which he stated was beneficial. He reports low mood d/t AH. He reports he was able to sleep well overnight and is with improved appetite. He denies VH. Denies his life is in danger or others are trying to harm/follow him. He denies active SI/HI, intent and plan, able to contract for safety. He denies recent use of all substances; UDS pending. He is adherent to medications, denies negative side effects. In behavioral control.   Pt refuses to provide personal collateral at this time.

## 2024-01-16 NOTE — BH INPATIENT PSYCHIATRY PROGRESS NOTE - NSBHATTESTBILLING_PSY_A_CORE
31792-Cbwngrfcgm OBS or IP - low complexity OR 25-34 mins 15850-Uqrkmtmbfj OBS or IP - low complexity OR 25-34 mins

## 2024-01-16 NOTE — BH INPATIENT PSYCHIATRY PROGRESS NOTE - NSBHMETABOLIC_PSY_ALL_CORE_FT
BMI: BMI (kg/m2): 24.6 (01-14-24 @ 12:21)  HbA1c: A1C with Estimated Average Glucose Result: 5.1 % (01-15-24 @ 08:34)    Glucose: POCT Blood Glucose.: 128 mg/dL (01-13-24 @ 04:49)    BP: 95/55 (01-15-24 @ 15:50) (95/55 - 111/56)Vital Signs Last 24 Hrs  T(C): 35.9 (01-15-24 @ 15:50), Max: 35.9 (01-15-24 @ 15:50)  T(F): 96.6 (01-15-24 @ 15:50), Max: 96.6 (01-15-24 @ 15:50)  HR: 71 (01-15-24 @ 15:50) (71 - 71)  BP: 95/55 (01-15-24 @ 15:50) (95/55 - 95/55)  BP(mean): --  RR: 18 (01-15-24 @ 15:50) (18 - 18)  SpO2: --      Lipid Panel: Date/Time: 01-15-24 @ 08:34  Cholesterol, Serum: 110  LDL Cholesterol Calculated: 39  HDL Cholesterol, Serum: 40  Total Cholesterol/HDL Ration Measurement: --  Triglycerides, Serum: 154

## 2024-01-16 NOTE — BH INPATIENT PSYCHIATRY PROGRESS NOTE - CURRENT MEDICATION
MEDICATIONS  (STANDING):  haloperidol     Tablet 10 milliGRAM(s) Oral daily    MEDICATIONS  (PRN):  acetaminophen     Tablet .. 650 milliGRAM(s) Oral every 6 hours PRN Temp greater or equal to 38C (100.4F), Mild Pain (1 - 3), Moderate Pain (4 - 6)  aluminum hydroxide/magnesium hydroxide/simethicone Suspension 30 milliLiter(s) Oral every 6 hours PRN Dyspepsia  diphenhydrAMINE 50 milliGRAM(s) Oral every 6 hours PRN Extrapyramidal prophylaxis  haloperidol     Tablet 5 milliGRAM(s) Oral every 6 hours PRN Agitation  hydrOXYzine hydrochloride 50 milliGRAM(s) Oral every 12 hours PRN Anxiety  LORazepam     Tablet 2 milliGRAM(s) Oral every 6 hours PRN Severe anxiety  melatonin 5 milliGRAM(s) Oral at bedtime PRN Insomnia   MEDICATIONS  (STANDING):  benztropine 0.5 milliGRAM(s) Oral two times a day  haloperidol     Tablet 10 milliGRAM(s) Oral at bedtime  haloperidol     Tablet 5 milliGRAM(s) Oral daily    MEDICATIONS  (PRN):  acetaminophen     Tablet .. 650 milliGRAM(s) Oral every 6 hours PRN Temp greater or equal to 38C (100.4F), Mild Pain (1 - 3), Moderate Pain (4 - 6)  aluminum hydroxide/magnesium hydroxide/simethicone Suspension 30 milliLiter(s) Oral every 6 hours PRN Dyspepsia  diphenhydrAMINE 50 milliGRAM(s) Oral every 6 hours PRN Extrapyramidal prophylaxis  haloperidol     Tablet 5 milliGRAM(s) Oral every 6 hours PRN Agitation  hydrOXYzine hydrochloride 50 milliGRAM(s) Oral every 12 hours PRN Anxiety  LORazepam     Tablet 2 milliGRAM(s) Oral every 6 hours PRN Severe anxiety  melatonin 5 milliGRAM(s) Oral at bedtime PRN Insomnia   MEDICATIONS  (STANDING):  benztropine 0.5 milliGRAM(s) Oral two times a day  haloperidol     Tablet 10 milliGRAM(s) Oral at bedtime  haloperidol     Tablet 5 milliGRAM(s) Oral daily    MEDICATIONS  (PRN):  acetaminophen     Tablet .. 650 milliGRAM(s) Oral every 6 hours PRN Temp greater or equal to 38C (100.4F), Mild Pain (1 - 3), Moderate Pain (4 - 6)  aluminum hydroxide/magnesium hydroxide/simethicone Suspension 30 milliLiter(s) Oral every 6 hours PRN Dyspepsia  diphenhydrAMINE 50 milliGRAM(s) Oral every 6 hours PRN Extrapyramidal prophylaxis  haloperidol     Tablet 5 milliGRAM(s) Oral every 6 hours PRN Agitation  hydrOXYzine hydrochloride 50 milliGRAM(s) Oral every 6 hours PRN anxiety/insomnia  LORazepam     Tablet 2 milliGRAM(s) Oral every 6 hours PRN physical aggression  melatonin 5 milliGRAM(s) Oral at bedtime PRN Insomnia  nicotine  Polacrilex Gum 2 milliGRAM(s) Oral every 2 hours PRN smoking cessation

## 2024-01-16 NOTE — BH INPATIENT PSYCHIATRY PROGRESS NOTE - NSBHCHARTREVIEWVS_PSY_A_CORE FT
Vital Signs Last 24 Hrs  T(C): 35.9 (01-15-24 @ 15:50), Max: 35.9 (01-15-24 @ 15:50)  T(F): 96.6 (01-15-24 @ 15:50), Max: 96.6 (01-15-24 @ 15:50)  HR: 71 (01-15-24 @ 15:50) (71 - 71)  BP: 95/55 (01-15-24 @ 15:50) (95/55 - 95/55)  BP(mean): --  RR: 18 (01-15-24 @ 15:50) (18 - 18)  SpO2: --

## 2024-01-17 PROCEDURE — 99231 SBSQ HOSP IP/OBS SF/LOW 25: CPT

## 2024-01-17 RX ADMIN — HALOPERIDOL DECANOATE 5 MILLIGRAM(S): 100 INJECTION INTRAMUSCULAR at 08:04

## 2024-01-17 RX ADMIN — Medication 0.5 MILLIGRAM(S): at 19:59

## 2024-01-17 RX ADMIN — Medication 0.5 MILLIGRAM(S): at 08:01

## 2024-01-17 RX ADMIN — HALOPERIDOL DECANOATE 10 MILLIGRAM(S): 100 INJECTION INTRAMUSCULAR at 19:58

## 2024-01-17 NOTE — BH INPATIENT PSYCHIATRY PROGRESS NOTE - NSBHASSESSSUMMFT_PSY_ALL_CORE
43-year-old man,, single with no significant PMH and with PPH of schizoaffective disorder, substance use disorder (alcohol, cannabis, cocaine), substance induced psychosis, PTSD, multiple prior hospitalizations (most recently to [facility] in 11/2023), reported history of suicide attempts, prior legal history of drug-related charges, who was BIB self after trying to hang himself with a belt from command auditory hallucinations. Pt presented with depressed mood, anhedonia, and aborted suicide attempt via hanging self with a belt, in the context of financial stressors and feelings of hopelessness. The patient has history of multiple prior hospitalizations and ED visits, is a poor utilizer of outpatient services, and has history of substance abuse. Pt will likely benefit from IPP at this time.    #Schizoaffective d/o  -c/w haldol 5 mg qAM/ 10 mg qHS  -c/w cogentin 0.5 mg bid for eps ppx    #H/o polysubstance use  -UDS pending    #Agitation  -for agitation not amenable to verbal redirection, may give haldol 5 mg q6h prn and ativan 2 mg q6h prn with escalation to IM if pt is refusing PO and is an acute danger to self or/and others with repeat EKG to ensure QTc <500 ms     43-year-old man,, single with no significant PMH and with PPH of schizoaffective disorder, substance use disorder (alcohol, cannabis, cocaine), substance induced psychosis, PTSD, multiple prior hospitalizations (most recently to [facility] in 11/2023), reported history of suicide attempts, prior legal history of drug-related charges, who was BIB self after trying to hang himself with a belt from command auditory hallucinations. Pt presented with depressed mood, anhedonia, and aborted suicide attempt via hanging self with a belt, in the context of financial stressors and feelings of hopelessness. The patient has history of multiple prior hospitalizations and ED visits, is a poor utilizer of outpatient services, and has history of substance abuse. Pt will likely benefit from IPP at this time.    On evaluation, pt presents more focused, linear and cooperative. He reports some improvement in AH, states less loud however continues in frequency. Reports AH telling him to hang self. He denies intent and plan, able to contract for safety, verbalizes reasons for living. He is future-oriented and inquiring on state hospitalization. Pt adherent to medications and in behavioral control.     #Schizoaffective d/o  -c/w haldol 5 mg qAM/ 10 mg qHS  -c/w cogentin 0.5 mg bid for eps ppx    #H/o polysubstance use  -UDS pending    #Agitation  -for agitation not amenable to verbal redirection, may give haldol 5 mg q6h prn and ativan 2 mg q6h prn with escalation to IM if pt is refusing PO and is an acute danger to self or/and others with repeat EKG to ensure QTc <500 ms

## 2024-01-17 NOTE — BH INPATIENT PSYCHIATRY PROGRESS NOTE - NSBHCHARTREVIEWVS_PSY_A_CORE FT
Vital Signs Last 24 Hrs  T(C): 35.6 (01-17-24 @ 08:20), Max: 36.2 (01-16-24 @ 17:04)  T(F): 96 (01-17-24 @ 08:20), Max: 97.2 (01-16-24 @ 17:04)  HR: 54 (01-17-24 @ 08:20) (54 - 73)  BP: 81/53 (01-17-24 @ 08:20) (81/53 - 119/75)  BP(mean): --  RR: 16 (01-17-24 @ 08:20) (16 - 16)  SpO2: --     Vital Signs Last 24 Hrs  T(C): 35.7 (01-17-24 @ 16:01), Max: 36.2 (01-16-24 @ 17:04)  T(F): 96.2 (01-17-24 @ 16:01), Max: 97.2 (01-16-24 @ 17:04)  HR: 78 (01-17-24 @ 16:01) (54 - 78)  BP: 99/61 (01-17-24 @ 16:01) (81/53 - 119/75)  BP(mean): --  RR: 18 (01-17-24 @ 16:01) (16 - 18)  SpO2: --

## 2024-01-17 NOTE — BH INPATIENT PSYCHIATRY PROGRESS NOTE - CURRENT MEDICATION
MEDICATIONS  (STANDING):  benztropine 0.5 milliGRAM(s) Oral two times a day  haloperidol     Tablet 10 milliGRAM(s) Oral at bedtime  haloperidol     Tablet 5 milliGRAM(s) Oral daily    MEDICATIONS  (PRN):  acetaminophen     Tablet .. 650 milliGRAM(s) Oral every 6 hours PRN Temp greater or equal to 38C (100.4F), Mild Pain (1 - 3), Moderate Pain (4 - 6)  aluminum hydroxide/magnesium hydroxide/simethicone Suspension 30 milliLiter(s) Oral every 6 hours PRN Dyspepsia  diphenhydrAMINE 50 milliGRAM(s) Oral every 6 hours PRN Extrapyramidal prophylaxis  haloperidol     Tablet 5 milliGRAM(s) Oral every 6 hours PRN Agitation  hydrOXYzine hydrochloride 50 milliGRAM(s) Oral every 6 hours PRN anxiety/insomnia  LORazepam     Tablet 2 milliGRAM(s) Oral every 6 hours PRN physical aggression  melatonin 5 milliGRAM(s) Oral at bedtime PRN Insomnia  nicotine  Polacrilex Gum 2 milliGRAM(s) Oral every 2 hours PRN smoking cessation

## 2024-01-17 NOTE — BH INPATIENT PSYCHIATRY PROGRESS NOTE - NSBHFUPINTERVALHXFT_PSY_A_CORE
Pt seen and evaluated, chart reviewed. As per nursing report, no acute events overnight, no PRNs. On evaluation, pt presents in bed, takes covers off for interview. He reports some improvement in AH, states less intense however continues in frequency. Reports AH telling him "to hang myself". He reports distress on AH, denies intent or plan to harm self. Pt reports reasons to live include his adult son. He reports he is sleeping and eating well. Denies VH. Denies his life is in danger or others are trying to harm/follow him. He denies active SI/HI, intent and plan, able to contract for safety. He is adherent to medications, denies negative side effects. In behavioral control.   Pt refuses to provide personal collateral at this time.

## 2024-01-17 NOTE — BH INPATIENT PSYCHIATRY PROGRESS NOTE - NSBHMETABOLIC_PSY_ALL_CORE_FT
BMI: BMI (kg/m2): 24.6 (01-14-24 @ 12:21)  HbA1c: A1C with Estimated Average Glucose Result: 5.1 % (01-15-24 @ 08:34)    Glucose: POCT Blood Glucose.: 128 mg/dL (01-13-24 @ 04:49)    BP: 81/53 (01-17-24 @ 08:20) (81/53 - 119/75)Vital Signs Last 24 Hrs  T(C): 35.6 (01-17-24 @ 08:20), Max: 36.2 (01-16-24 @ 17:04)  T(F): 96 (01-17-24 @ 08:20), Max: 97.2 (01-16-24 @ 17:04)  HR: 54 (01-17-24 @ 08:20) (54 - 73)  BP: 81/53 (01-17-24 @ 08:20) (81/53 - 119/75)  BP(mean): --  RR: 16 (01-17-24 @ 08:20) (16 - 16)  SpO2: --      Lipid Panel: Date/Time: 01-15-24 @ 08:34  Cholesterol, Serum: 110  LDL Cholesterol Calculated: 39  HDL Cholesterol, Serum: 40  Total Cholesterol/HDL Ration Measurement: --  Triglycerides, Serum: 154   BMI: BMI (kg/m2): 24.6 (01-14-24 @ 12:21)  HbA1c: A1C with Estimated Average Glucose Result: 5.1 % (01-15-24 @ 08:34)    Glucose: POCT Blood Glucose.: 128 mg/dL (01-13-24 @ 04:49)    BP: 99/61 (01-17-24 @ 16:01) (81/53 - 119/75)Vital Signs Last 24 Hrs  T(C): 35.7 (01-17-24 @ 16:01), Max: 36.2 (01-16-24 @ 17:04)  T(F): 96.2 (01-17-24 @ 16:01), Max: 97.2 (01-16-24 @ 17:04)  HR: 78 (01-17-24 @ 16:01) (54 - 78)  BP: 99/61 (01-17-24 @ 16:01) (81/53 - 119/75)  BP(mean): --  RR: 18 (01-17-24 @ 16:01) (16 - 18)  SpO2: --      Lipid Panel: Date/Time: 01-15-24 @ 08:34  Cholesterol, Serum: 110  LDL Cholesterol Calculated: 39  HDL Cholesterol, Serum: 40  Total Cholesterol/HDL Ration Measurement: --  Triglycerides, Serum: 154

## 2024-01-17 NOTE — BH INPATIENT PSYCHIATRY PROGRESS NOTE - PRN MEDS
MEDICATIONS  (PRN):  acetaminophen     Tablet .. 650 milliGRAM(s) Oral every 6 hours PRN Temp greater or equal to 38C (100.4F), Mild Pain (1 - 3), Moderate Pain (4 - 6)  aluminum hydroxide/magnesium hydroxide/simethicone Suspension 30 milliLiter(s) Oral every 6 hours PRN Dyspepsia  diphenhydrAMINE 50 milliGRAM(s) Oral every 6 hours PRN Extrapyramidal prophylaxis  haloperidol     Tablet 5 milliGRAM(s) Oral every 6 hours PRN Agitation  hydrOXYzine hydrochloride 50 milliGRAM(s) Oral every 6 hours PRN anxiety/insomnia  LORazepam     Tablet 2 milliGRAM(s) Oral every 6 hours PRN physical aggression  melatonin 5 milliGRAM(s) Oral at bedtime PRN Insomnia  nicotine  Polacrilex Gum 2 milliGRAM(s) Oral every 2 hours PRN smoking cessation

## 2024-01-18 PROCEDURE — 99231 SBSQ HOSP IP/OBS SF/LOW 25: CPT

## 2024-01-18 RX ORDER — HALOPERIDOL DECANOATE 100 MG/ML
10 INJECTION INTRAMUSCULAR
Refills: 0 | Status: DISCONTINUED | OUTPATIENT
Start: 2024-01-18 | End: 2024-01-25

## 2024-01-18 RX ADMIN — Medication 0.5 MILLIGRAM(S): at 20:16

## 2024-01-18 RX ADMIN — HALOPERIDOL DECANOATE 10 MILLIGRAM(S): 100 INJECTION INTRAMUSCULAR at 20:16

## 2024-01-18 RX ADMIN — Medication 0.5 MILLIGRAM(S): at 08:10

## 2024-01-18 RX ADMIN — HALOPERIDOL DECANOATE 5 MILLIGRAM(S): 100 INJECTION INTRAMUSCULAR at 08:10

## 2024-01-18 NOTE — BH INPATIENT PSYCHIATRY PROGRESS NOTE - NSBHFUPINTERVALHXFT_PSY_A_CORE
Pt seen and evaluated, chart reviewed. As per nursing report, no acute events overnight, no PRNs. On evaluation, pt presents in bed, calm and cooperative with improved focus. He reports some improvement in AH, states less frequent and intense than admission. Reports AH telling him "to die". He reports continued distress on AH, denies intent or plan to harm self. Pt reports reasons to live include his family. He reports he is sleeping and eating well. Denies VH. Denies paranoia. He denies active SI/HI, intent and plan, able to contract for safety. He is adherent to medications, denies negative side effects. In behavioral control.   Pt refuses to provide personal collateral at this time.

## 2024-01-18 NOTE — BH SAFETY PLAN - THE ONE THING THAT IS MOST IMPORTANT TO ME AND WORTH LIVING FOR IS:
The one thing that is most important to me and worth living for would be fro me to live a good safe and happy life.

## 2024-01-18 NOTE — BH INPATIENT PSYCHIATRY PROGRESS NOTE - NSBHMETABOLIC_PSY_ALL_CORE_FT
BMI: BMI (kg/m2): 24.6 (01-14-24 @ 12:21)  HbA1c: A1C with Estimated Average Glucose Result: 5.1 % (01-15-24 @ 08:34)    Glucose: POCT Blood Glucose.: 128 mg/dL (01-13-24 @ 04:49)    BP: 105/53 (01-18-24 @ 08:13) (81/53 - 119/75)Vital Signs Last 24 Hrs  T(C): 35.7 (01-17-24 @ 16:01), Max: 35.7 (01-17-24 @ 16:01)  T(F): 96.2 (01-17-24 @ 16:01), Max: 96.2 (01-17-24 @ 16:01)  HR: 57 (01-18-24 @ 08:13) (57 - 78)  BP: 105/53 (01-18-24 @ 08:13) (99/61 - 105/53)  BP(mean): --  RR: 18 (01-18-24 @ 08:13) (18 - 18)  SpO2: --      Lipid Panel: Date/Time: 01-15-24 @ 08:34  Cholesterol, Serum: 110  LDL Cholesterol Calculated: 39  HDL Cholesterol, Serum: 40  Total Cholesterol/HDL Ration Measurement: --  Triglycerides, Serum: 154

## 2024-01-18 NOTE — BH INPATIENT PSYCHIATRY PROGRESS NOTE - NSBHASSESSSUMMFT_PSY_ALL_CORE
43-year-old man,, single with no significant PMH and with PPH of schizoaffective disorder, substance use disorder (alcohol, cannabis, cocaine), substance induced psychosis, PTSD, multiple prior hospitalizations (most recently to [facility] in 11/2023), reported history of suicide attempts, prior legal history of drug-related charges, who was BIB self after trying to hang himself with a belt from command auditory hallucinations. Pt presented with depressed mood, anhedonia, and aborted suicide attempt via hanging self with a belt, in the context of financial stressors and feelings of hopelessness. The patient has history of multiple prior hospitalizations and ED visits, is a poor utilizer of outpatient services, and has history of substance abuse. Pt will likely benefit from IPP at this time.    On evaluation, pt presents more focused, linear and cooperative. He reports some improvement in AH, now states less frequent and intense compared to admission, continues to be distressing. He reports AH telling him to harm self. He denies intent and plan, able to contract for safety, verbalizes reasons for living. He is future-oriented and help seeking. Pt adherent to medications and in behavioral control.     #Schizoaffective d/o  -titrate haldol 10 mg bid  -c/w cogentin 0.5 mg bid for eps ppx    #H/o polysubstance use  -UDS pending    #Agitation  -for agitation not amenable to verbal redirection, may give haldol 5 mg q6h prn and ativan 2 mg q6h prn with escalation to IM if pt is refusing PO and is an acute danger to self or/and others with repeat EKG to ensure QTc <500 ms

## 2024-01-18 NOTE — BH INPATIENT PSYCHIATRY PROGRESS NOTE - CURRENT MEDICATION
MEDICATIONS  (STANDING):  benztropine 0.5 milliGRAM(s) Oral two times a day  haloperidol     Tablet 10 milliGRAM(s) Oral two times a day    MEDICATIONS  (PRN):  acetaminophen     Tablet .. 650 milliGRAM(s) Oral every 6 hours PRN Temp greater or equal to 38C (100.4F), Mild Pain (1 - 3), Moderate Pain (4 - 6)  aluminum hydroxide/magnesium hydroxide/simethicone Suspension 30 milliLiter(s) Oral every 6 hours PRN Dyspepsia  diphenhydrAMINE 50 milliGRAM(s) Oral every 6 hours PRN Extrapyramidal prophylaxis  haloperidol     Tablet 5 milliGRAM(s) Oral every 6 hours PRN Agitation  hydrOXYzine hydrochloride 50 milliGRAM(s) Oral every 6 hours PRN anxiety/insomnia  LORazepam     Tablet 2 milliGRAM(s) Oral every 6 hours PRN physical aggression  melatonin 5 milliGRAM(s) Oral at bedtime PRN Insomnia  nicotine  Polacrilex Gum 2 milliGRAM(s) Oral every 2 hours PRN smoking cessation

## 2024-01-18 NOTE — BH INPATIENT PSYCHIATRY PROGRESS NOTE - NSBHCHARTREVIEWVS_PSY_A_CORE FT
Vital Signs Last 24 Hrs  T(C): 35.7 (01-17-24 @ 16:01), Max: 35.7 (01-17-24 @ 16:01)  T(F): 96.2 (01-17-24 @ 16:01), Max: 96.2 (01-17-24 @ 16:01)  HR: 57 (01-18-24 @ 08:13) (57 - 78)  BP: 105/53 (01-18-24 @ 08:13) (99/61 - 105/53)  BP(mean): --  RR: 18 (01-18-24 @ 08:13) (18 - 18)  SpO2: --

## 2024-01-19 PROCEDURE — 99231 SBSQ HOSP IP/OBS SF/LOW 25: CPT

## 2024-01-19 RX ADMIN — HALOPERIDOL DECANOATE 10 MILLIGRAM(S): 100 INJECTION INTRAMUSCULAR at 20:32

## 2024-01-19 RX ADMIN — Medication 0.5 MILLIGRAM(S): at 08:05

## 2024-01-19 RX ADMIN — Medication 0.5 MILLIGRAM(S): at 20:32

## 2024-01-19 RX ADMIN — HALOPERIDOL DECANOATE 10 MILLIGRAM(S): 100 INJECTION INTRAMUSCULAR at 08:04

## 2024-01-19 NOTE — BH INPATIENT PSYCHIATRY PROGRESS NOTE - NSBHCHARTREVIEWVS_PSY_A_CORE FT
Vital Signs Last 24 Hrs  T(C): 35.7 (01-18-24 @ 20:12), Max: 35.7 (01-18-24 @ 20:12)  T(F): 96.2 (01-18-24 @ 20:12), Max: 96.2 (01-18-24 @ 20:12)  HR: 72 (01-18-24 @ 20:12) (72 - 72)  BP: 119/62 (01-18-24 @ 20:12) (119/62 - 119/62)  BP(mean): --  RR: 18 (01-18-24 @ 20:12) (18 - 18)  SpO2: 98% (01-18-24 @ 20:13) (98% - 98%)

## 2024-01-19 NOTE — BH INPATIENT PSYCHIATRY PROGRESS NOTE - NSBHMETABOLIC_PSY_ALL_CORE_FT
BMI: BMI (kg/m2): 24.6 (01-14-24 @ 12:21)  HbA1c: A1C with Estimated Average Glucose Result: 5.1 % (01-15-24 @ 08:34)    Glucose: POCT Blood Glucose.: 128 mg/dL (01-13-24 @ 04:49)    BP: 119/62 (01-18-24 @ 20:12) (81/53 - 119/75)Vital Signs Last 24 Hrs  T(C): 35.7 (01-18-24 @ 20:12), Max: 35.7 (01-18-24 @ 20:12)  T(F): 96.2 (01-18-24 @ 20:12), Max: 96.2 (01-18-24 @ 20:12)  HR: 72 (01-18-24 @ 20:12) (72 - 72)  BP: 119/62 (01-18-24 @ 20:12) (119/62 - 119/62)  BP(mean): --  RR: 18 (01-18-24 @ 20:12) (18 - 18)  SpO2: 98% (01-18-24 @ 20:13) (98% - 98%)      Lipid Panel: Date/Time: 01-15-24 @ 08:34  Cholesterol, Serum: 110  LDL Cholesterol Calculated: 39  HDL Cholesterol, Serum: 40  Total Cholesterol/HDL Ration Measurement: --  Triglycerides, Serum: 154

## 2024-01-19 NOTE — BH INPATIENT PSYCHIATRY PROGRESS NOTE - NSBHFUPINTERVALHXFT_PSY_A_CORE
Pt seen and evaluated, chart reviewed. As per nursing report, no acute events overnight, no PRNs. On evaluation, pt presents with bright affect in room, greets treatment team pleasantly. He inquires on when he can shave and requesting for snacks. He endorses continued AH, states improving since admission, less frequent and intense. He reports he is also more easily able to ignore, however continues with distress. Reports AH telling him "to kill myself". He denies intent or plan to harm self, is future-oriented and reports desire to find housing. Pt reports reasons to live include his family. He reports he is sleeping and eating well. Denies VH. Denies paranoia. He denies active SI/HI, intent and plan, able to contract for safety. He is adherent to medications, denies negative side effects. In behavioral control.   Pt refuses to provide personal collateral at this time.

## 2024-01-19 NOTE — BH INPATIENT PSYCHIATRY PROGRESS NOTE - NSBHASSESSSUMMFT_PSY_ALL_CORE
43-year-old man,, single with no significant PMH and with PPH of schizoaffective disorder, substance use disorder (alcohol, cannabis, cocaine), substance induced psychosis, PTSD, multiple prior hospitalizations (most recently to [facility] in 11/2023), reported history of suicide attempts, prior legal history of drug-related charges, who was BIB self after trying to hang himself with a belt from command auditory hallucinations. Pt presented with depressed mood, anhedonia, and aborted suicide attempt via hanging self with a belt, in the context of financial stressors and feelings of hopelessness. The patient has history of multiple prior hospitalizations and ED visits, is a poor utilizer of outpatient services, and has history of substance abuse. Pt will likely benefit from IPP at this time.    On evaluation, pt presents more focused, linear and cooperative. He reports improving AH, now states less frequent and intense compared to admission, more easily ignorable, continues to be distressing. He reports AH telling him to harm self. He denies intent and plan, able to contract for safety, verbalizes reasons for living. He is future-oriented and help seeking. Pt adherent to medications and in behavioral control.     #Schizoaffective d/o  -c/w haldol 10 mg bid  -c/w cogentin 0.5 mg bid for eps ppx    #H/o polysubstance use  -UDS pending    #Agitation  -for agitation not amenable to verbal redirection, may give haldol 5 mg q6h prn and ativan 2 mg q6h prn with escalation to IM if pt is refusing PO and is an acute danger to self or/and others with repeat EKG to ensure QTc <500 ms

## 2024-01-20 RX ADMIN — HALOPERIDOL DECANOATE 10 MILLIGRAM(S): 100 INJECTION INTRAMUSCULAR at 20:21

## 2024-01-20 RX ADMIN — Medication 0.5 MILLIGRAM(S): at 20:21

## 2024-01-20 RX ADMIN — Medication 0.5 MILLIGRAM(S): at 08:11

## 2024-01-20 RX ADMIN — HALOPERIDOL DECANOATE 10 MILLIGRAM(S): 100 INJECTION INTRAMUSCULAR at 08:11

## 2024-01-21 RX ADMIN — Medication 0.5 MILLIGRAM(S): at 08:05

## 2024-01-21 RX ADMIN — Medication 650 MILLIGRAM(S): at 10:03

## 2024-01-21 RX ADMIN — Medication 0.5 MILLIGRAM(S): at 21:12

## 2024-01-21 RX ADMIN — HALOPERIDOL DECANOATE 10 MILLIGRAM(S): 100 INJECTION INTRAMUSCULAR at 21:12

## 2024-01-21 RX ADMIN — HALOPERIDOL DECANOATE 10 MILLIGRAM(S): 100 INJECTION INTRAMUSCULAR at 08:05

## 2024-01-21 RX ADMIN — Medication 650 MILLIGRAM(S): at 08:08

## 2024-01-22 LAB
ALBUMIN SERPL ELPH-MCNC: 4.3 G/DL — SIGNIFICANT CHANGE UP (ref 3.5–5.2)
ALP SERPL-CCNC: 80 U/L — SIGNIFICANT CHANGE UP (ref 30–115)
ALT FLD-CCNC: 34 U/L — SIGNIFICANT CHANGE UP (ref 0–41)
ANION GAP SERPL CALC-SCNC: 14 MMOL/L — SIGNIFICANT CHANGE UP (ref 7–14)
AST SERPL-CCNC: 24 U/L — SIGNIFICANT CHANGE UP (ref 0–41)
BASOPHILS # BLD AUTO: 0.04 K/UL — SIGNIFICANT CHANGE UP (ref 0–0.2)
BASOPHILS NFR BLD AUTO: 0.5 % — SIGNIFICANT CHANGE UP (ref 0–1)
BILIRUB SERPL-MCNC: 0.9 MG/DL — SIGNIFICANT CHANGE UP (ref 0.2–1.2)
BUN SERPL-MCNC: 10 MG/DL — SIGNIFICANT CHANGE UP (ref 10–20)
CALCIUM SERPL-MCNC: 9.7 MG/DL — SIGNIFICANT CHANGE UP (ref 8.4–10.5)
CHLORIDE SERPL-SCNC: 98 MMOL/L — SIGNIFICANT CHANGE UP (ref 98–110)
CK SERPL-CCNC: 82 U/L — SIGNIFICANT CHANGE UP (ref 0–225)
CO2 SERPL-SCNC: 23 MMOL/L — SIGNIFICANT CHANGE UP (ref 17–32)
CREAT SERPL-MCNC: 1.2 MG/DL — SIGNIFICANT CHANGE UP (ref 0.7–1.5)
EGFR: 78 ML/MIN/1.73M2 — SIGNIFICANT CHANGE UP
EOSINOPHIL # BLD AUTO: 0.04 K/UL — SIGNIFICANT CHANGE UP (ref 0–0.7)
EOSINOPHIL NFR BLD AUTO: 0.5 % — SIGNIFICANT CHANGE UP (ref 0–8)
GLUCOSE SERPL-MCNC: 157 MG/DL — HIGH (ref 70–99)
HCT VFR BLD CALC: 43.4 % — SIGNIFICANT CHANGE UP (ref 42–52)
HGB BLD-MCNC: 15.1 G/DL — SIGNIFICANT CHANGE UP (ref 14–18)
IMM GRANULOCYTES NFR BLD AUTO: 0.8 % — HIGH (ref 0.1–0.3)
LYMPHOCYTES # BLD AUTO: 0.18 K/UL — LOW (ref 1.2–3.4)
LYMPHOCYTES # BLD AUTO: 2.5 % — LOW (ref 20.5–51.1)
MCHC RBC-ENTMCNC: 26.9 PG — LOW (ref 27–31)
MCHC RBC-ENTMCNC: 34.8 G/DL — SIGNIFICANT CHANGE UP (ref 32–37)
MCV RBC AUTO: 77.2 FL — LOW (ref 80–94)
MONOCYTES # BLD AUTO: 0.68 K/UL — HIGH (ref 0.1–0.6)
MONOCYTES NFR BLD AUTO: 9.3 % — SIGNIFICANT CHANGE UP (ref 1.7–9.3)
NEUTROPHILS # BLD AUTO: 6.29 K/UL — SIGNIFICANT CHANGE UP (ref 1.4–6.5)
NEUTROPHILS NFR BLD AUTO: 86.4 % — HIGH (ref 42.2–75.2)
NRBC # BLD: 0 /100 WBCS — SIGNIFICANT CHANGE UP (ref 0–0)
PLATELET # BLD AUTO: 114 K/UL — LOW (ref 130–400)
PMV BLD: 9.3 FL — SIGNIFICANT CHANGE UP (ref 7.4–10.4)
POTASSIUM SERPL-MCNC: 4.1 MMOL/L — SIGNIFICANT CHANGE UP (ref 3.5–5)
POTASSIUM SERPL-SCNC: 4.1 MMOL/L — SIGNIFICANT CHANGE UP (ref 3.5–5)
PROT SERPL-MCNC: 6.3 G/DL — SIGNIFICANT CHANGE UP (ref 6–8)
RBC # BLD: 5.62 M/UL — SIGNIFICANT CHANGE UP (ref 4.7–6.1)
RBC # FLD: 13.3 % — SIGNIFICANT CHANGE UP (ref 11.5–14.5)
SODIUM SERPL-SCNC: 135 MMOL/L — SIGNIFICANT CHANGE UP (ref 135–146)
WBC # BLD: 7.29 K/UL — SIGNIFICANT CHANGE UP (ref 4.8–10.8)
WBC # FLD AUTO: 7.29 K/UL — SIGNIFICANT CHANGE UP (ref 4.8–10.8)

## 2024-01-22 PROCEDURE — 99231 SBSQ HOSP IP/OBS SF/LOW 25: CPT

## 2024-01-22 RX ORDER — MIRTAZAPINE 45 MG/1
15 TABLET, ORALLY DISINTEGRATING ORAL AT BEDTIME
Refills: 0 | Status: DISCONTINUED | OUTPATIENT
Start: 2024-01-22 | End: 2024-01-25

## 2024-01-22 RX ADMIN — Medication 5 MILLIGRAM(S): at 20:37

## 2024-01-22 RX ADMIN — Medication 650 MILLIGRAM(S): at 22:07

## 2024-01-22 RX ADMIN — Medication 650 MILLIGRAM(S): at 17:33

## 2024-01-22 RX ADMIN — HALOPERIDOL DECANOATE 10 MILLIGRAM(S): 100 INJECTION INTRAMUSCULAR at 08:04

## 2024-01-22 RX ADMIN — Medication 650 MILLIGRAM(S): at 22:17

## 2024-01-22 RX ADMIN — MIRTAZAPINE 15 MILLIGRAM(S): 45 TABLET, ORALLY DISINTEGRATING ORAL at 20:37

## 2024-01-22 RX ADMIN — Medication 650 MILLIGRAM(S): at 15:31

## 2024-01-22 RX ADMIN — Medication 0.5 MILLIGRAM(S): at 20:32

## 2024-01-22 RX ADMIN — Medication 0.5 MILLIGRAM(S): at 08:04

## 2024-01-22 NOTE — BH INPATIENT PSYCHIATRY PROGRESS NOTE - NSBHASSESSSUMMFT_PSY_ALL_CORE
43-year-old man,, single with no significant PMH and with PPH of schizoaffective disorder, substance use disorder (alcohol, cannabis, cocaine), substance induced psychosis, PTSD, multiple prior hospitalizations (most recently to [facility] in 11/2023), reported history of suicide attempts, prior legal history of drug-related charges, who was BIB self after trying to hang himself with a belt from command auditory hallucinations. Pt presented with depressed mood, anhedonia, and aborted suicide attempt via hanging self with a belt, in the context of financial stressors and feelings of hopelessness. The patient has history of multiple prior hospitalizations and ED visits, is a poor utilizer of outpatient services, and has history of substance abuse. Pt will likely benefit from IPP at this time.    On evaluation, pt presents more focused, linear and cooperative. He reports improving AH, initially reported some improvement in frequency, however today reports similar to admission. He reports AH telling him to harm self. He denies intent and plan to harm self. He is future-oriented, requesting to speak with SW regarding housing and social security benefits. Pt adherent to medications and in behavioral control.     #Schizoaffective d/o  -c/w haldol 10 mg bid  -c/w cogentin 0.5 mg bid for eps ppx  -start remeron 15 mg qhs     #H/o polysubstance use  -UDS pending    #Agitation  -for agitation not amenable to verbal redirection, may give haldol 5 mg q6h prn and ativan 2 mg q6h prn with escalation to IM if pt is refusing PO and is an acute danger to self or/and others with repeat EKG to ensure QTc <500 ms

## 2024-01-22 NOTE — BH CHART NOTE - NSEVENTNOTEFT_PSY_ALL_CORE
Called by RN, pt febrile 101.5F, diaphoretic, observed mild rigidity in UEs, VS noted. Evaluated with Dr. Leyva. Pending stat CK, discussed with medical PA. Discussed with RN to continue to monitor for NMS sx, and c/w VS q2h x2 and q4h. Will continue to monitor.  Called by RN, pt febrile 101.5F, diaphoretic, observed mild rigidity in UEs, VS noted. Evaluated with Dr. Leyva. Pending stat CK, discussed with medical PA. Discussed with RN to continue to monitor for NMS sx, and c/w VS q2h x2 and q4h. Will continue to monitor.   On reassessment, pt presents AOx3, calm and cooperative, reports feeling improved. Repeat VS WNL, afebrile, diaphoresis improved. Labs reviewed and WNL, CK 82. Discussed with Dr. Leyva.  Called by RN, pt febrile 101.5F, diaphoretic, observed mild rigidity in UEs, VS noted. Evaluated with Dr. Leyva. Pending stat CK, discussed with medical PA. Discussed with RN to continue to monitor for NMS sx, and c/w VS q2h x2 and q4h. Will continue to monitor.   On reassessment, pt presents AOx3, calm and cooperative, reports feeling improved. Repeat VS WNL, afebrile, diaphoresis improved. Labs reviewed and WNL, CK 82. Discussed with Dr. Leyva. Will continue to observe

## 2024-01-22 NOTE — BH INPATIENT PSYCHIATRY PROGRESS NOTE - CURRENT MEDICATION
MEDICATIONS  (STANDING):  benztropine 0.5 milliGRAM(s) Oral two times a day  haloperidol     Tablet 10 milliGRAM(s) Oral two times a day    MEDICATIONS  (PRN):  acetaminophen     Tablet .. 650 milliGRAM(s) Oral every 6 hours PRN Temp greater or equal to 38C (100.4F), Mild Pain (1 - 3), Moderate Pain (4 - 6)  aluminum hydroxide/magnesium hydroxide/simethicone Suspension 30 milliLiter(s) Oral every 6 hours PRN Dyspepsia  diphenhydrAMINE 50 milliGRAM(s) Oral every 6 hours PRN Extrapyramidal prophylaxis  haloperidol     Tablet 5 milliGRAM(s) Oral every 6 hours PRN Agitation  hydrOXYzine hydrochloride 50 milliGRAM(s) Oral every 6 hours PRN anxiety/insomnia  LORazepam     Tablet 2 milliGRAM(s) Oral every 6 hours PRN physical aggression  melatonin 5 milliGRAM(s) Oral at bedtime PRN Insomnia  nicotine  Polacrilex Gum 2 milliGRAM(s) Oral every 2 hours PRN smoking cessation   MEDICATIONS  (STANDING):  benztropine 0.5 milliGRAM(s) Oral two times a day  haloperidol     Tablet 10 milliGRAM(s) Oral two times a day  mirtazapine 15 milliGRAM(s) Oral at bedtime    MEDICATIONS  (PRN):  acetaminophen     Tablet .. 650 milliGRAM(s) Oral every 6 hours PRN Temp greater or equal to 38C (100.4F), Mild Pain (1 - 3), Moderate Pain (4 - 6)  aluminum hydroxide/magnesium hydroxide/simethicone Suspension 30 milliLiter(s) Oral every 6 hours PRN Dyspepsia  diphenhydrAMINE 50 milliGRAM(s) Oral every 6 hours PRN Extrapyramidal prophylaxis  haloperidol     Tablet 5 milliGRAM(s) Oral every 6 hours PRN Agitation  hydrOXYzine hydrochloride 50 milliGRAM(s) Oral every 6 hours PRN anxiety/insomnia  LORazepam     Tablet 2 milliGRAM(s) Oral every 6 hours PRN physical aggression  melatonin 5 milliGRAM(s) Oral at bedtime PRN Insomnia  nicotine  Polacrilex Gum 2 milliGRAM(s) Oral every 2 hours PRN smoking cessation

## 2024-01-22 NOTE — BH INPATIENT PSYCHIATRY PROGRESS NOTE - NSBHFUPINTERVALHXFT_PSY_A_CORE
Pt seen and evaluated, chart reviewed. As per nursing report, no acute events overnight, no PRNs. On evaluation, pt presents linear with improved eye contact, anxious. He states he continues with AH telling him to "kill myself". He states "as bad as before". He reports distress on AH with difficulty sleeping and limited appetite. He denies intent or plan to harm self. Pt is future-oriented and requesting to speak with SW, states he has to f/u with his social security benefits. Pt reports reasons to live include his family. Denies VH. Denies paranoia. He denies SI/HI, intent and plan. He is adherent to medications, denies negative side effects. In behavioral control. Continues on Covid19 isolative.

## 2024-01-22 NOTE — BH INPATIENT PSYCHIATRY PROGRESS NOTE - NSBHCHARTREVIEWVS_PSY_A_CORE FT
Vital Signs Last 24 Hrs  T(C): 35.9 (01-21-24 @ 16:24), Max: 35.9 (01-21-24 @ 16:24)  T(F): 96.6 (01-21-24 @ 16:24), Max: 96.6 (01-21-24 @ 16:24)  HR: 83 (01-21-24 @ 16:24) (83 - 83)  BP: 108/68 (01-21-24 @ 16:24) (108/68 - 108/68)  BP(mean): --  RR: 20 (01-21-24 @ 16:24) (18 - 20)  SpO2: --     Vital Signs Last 24 Hrs  T(C): 38.4 (01-22-24 @ 22:17), Max: 38.6 (01-22-24 @ 15:42)  T(F): 101.2 (01-22-24 @ 22:17), Max: 101.5 (01-22-24 @ 15:42)  HR: 73 (01-22-24 @ 22:17) (73 - 96)  BP: 99/66 (01-22-24 @ 22:17) (94/65 - 100/77)  BP(mean): --  RR: 18 (01-22-24 @ 22:17) (16 - 18)  SpO2: 97% (01-22-24 @ 22:17) (97% - 100%)

## 2024-01-22 NOTE — CHART NOTE - NSCHARTNOTEFT_GEN_A_CORE
called by Rn for suspicion of neuroleptic malignant syndrome     Per psych NP, patient was febrile to 101.5F around 3:30pm today, he felt confused, diaphoretic, and was stiff on physical exam.   On my exam patient was alert, oriented, cooperative. Patient was moving all extremities freely and had no gait abnormalities. Patient vitals were also repeated, patient was afebrile, 98.4F, HR 89, bp 100/77.     Patient is currently on haldol 10mg PO BID which was recently increased during his admission for auditory hallucinations.     Plan:  CBC, cmp and ck ordered - WNL  Case discussed with Psych NP who is in agreement that this may be a false alarm, but will continue to monitor for recurrence or development of new symptoms.   Case also discussed with Dr. Lopez whom suggested neuro consult if there is still concern or patient begins to display further symptoms

## 2024-01-22 NOTE — BH INPATIENT PSYCHIATRY PROGRESS NOTE - NSBHMETABOLIC_PSY_ALL_CORE_FT
BMI: BMI (kg/m2): 24.6 (01-14-24 @ 12:21)  HbA1c: A1C with Estimated Average Glucose Result: 5.1 % (01-15-24 @ 08:34)    Glucose: POCT Blood Glucose.: 128 mg/dL (01-13-24 @ 04:49)    BP: 108/68 (01-21-24 @ 16:24) (94/64 - 115/70)Vital Signs Last 24 Hrs  T(C): 35.9 (01-21-24 @ 16:24), Max: 35.9 (01-21-24 @ 16:24)  T(F): 96.6 (01-21-24 @ 16:24), Max: 96.6 (01-21-24 @ 16:24)  HR: 83 (01-21-24 @ 16:24) (83 - 83)  BP: 108/68 (01-21-24 @ 16:24) (108/68 - 108/68)  BP(mean): --  RR: 20 (01-21-24 @ 16:24) (18 - 20)  SpO2: --      Lipid Panel: Date/Time: 01-15-24 @ 08:34  Cholesterol, Serum: 110  LDL Cholesterol Calculated: 39  HDL Cholesterol, Serum: 40  Total Cholesterol/HDL Ration Measurement: --  Triglycerides, Serum: 154   BMI: BMI (kg/m2): 24.6 (01-14-24 @ 12:21)  HbA1c: A1C with Estimated Average Glucose Result: 5.1 % (01-15-24 @ 08:34)    Glucose: POCT Blood Glucose.: 128 mg/dL (01-13-24 @ 04:49)    BP: 99/66 (01-22-24 @ 22:17) (94/64 - 108/68)Vital Signs Last 24 Hrs  T(C): 38.4 (01-22-24 @ 22:17), Max: 38.6 (01-22-24 @ 15:42)  T(F): 101.2 (01-22-24 @ 22:17), Max: 101.5 (01-22-24 @ 15:42)  HR: 73 (01-22-24 @ 22:17) (73 - 96)  BP: 99/66 (01-22-24 @ 22:17) (94/65 - 100/77)  BP(mean): --  RR: 18 (01-22-24 @ 22:17) (16 - 18)  SpO2: 97% (01-22-24 @ 22:17) (97% - 100%)      Lipid Panel: Date/Time: 01-15-24 @ 08:34  Cholesterol, Serum: 110  LDL Cholesterol Calculated: 39  HDL Cholesterol, Serum: 40  Total Cholesterol/HDL Ration Measurement: --  Triglycerides, Serum: 154

## 2024-01-23 PROCEDURE — 99231 SBSQ HOSP IP/OBS SF/LOW 25: CPT

## 2024-01-23 RX ADMIN — HALOPERIDOL DECANOATE 10 MILLIGRAM(S): 100 INJECTION INTRAMUSCULAR at 08:07

## 2024-01-23 RX ADMIN — Medication 650 MILLIGRAM(S): at 06:30

## 2024-01-23 RX ADMIN — Medication 5 MILLIGRAM(S): at 20:25

## 2024-01-23 RX ADMIN — Medication 650 MILLIGRAM(S): at 16:50

## 2024-01-23 RX ADMIN — Medication 650 MILLIGRAM(S): at 06:58

## 2024-01-23 RX ADMIN — Medication 650 MILLIGRAM(S): at 15:30

## 2024-01-23 RX ADMIN — HALOPERIDOL DECANOATE 10 MILLIGRAM(S): 100 INJECTION INTRAMUSCULAR at 20:25

## 2024-01-23 RX ADMIN — Medication 0.5 MILLIGRAM(S): at 20:25

## 2024-01-23 RX ADMIN — MIRTAZAPINE 15 MILLIGRAM(S): 45 TABLET, ORALLY DISINTEGRATING ORAL at 20:25

## 2024-01-23 RX ADMIN — Medication 0.5 MILLIGRAM(S): at 08:07

## 2024-01-23 NOTE — BH INPATIENT PSYCHIATRY PROGRESS NOTE - NSBHFUPINTERVALHXFT_PSY_A_CORE
Pt seen and evaluated, chart reviewed. As per nursing report, pt febrile overnight, worked up for NMS with labs WNL, PM dose of haldol held. On evaluation, pt presents alert, linear and AOx3, diaphoretic although improved from yesterday, afebrile, denies rigidity and is negative on exam. He endorses worsened AH today, states "like when I first came". He reports constant AH telling him to "kill myself". He reports he is with AH at baseline, however more easily ignorable and less frequent. He denies intent or plan to harm self. Pt is future-oriented and requesting to speak with SW, states he has to f/u with his social security benefits. Pt reports reasons to live include his family. Denies VH. Denies paranoia. He denies SI/HI, intent and plan. He is adherent to medications; of note, discussed prior medication trials, pt reports h/o good response to seroquel. In behavioral control. Continues on Covid19 isolation.  Pt seen and evaluated, chart reviewed. As per nursing report, pt febrile overnight, worked up for NMS with labs WNL, PM dose of haldol held. On evaluation, pt presents alert, linear and AOx3, diaphoretic although improved from yesterday, afebrile, denies rigidity and is negative on exam. He endorses worsened AH today, states "like when I first came". He reports constant AH telling him to "kill myself". He reports he is with AH at baseline, however more easily ignorable and less frequent. He denies intent or plan to harm self. Pt is future-oriented and requesting to speak with SW, states he has to f/u with his social security benefits. Pt reports reasons to live include his family. Denies VH. Denies paranoia. He denies SI/HI, intent and plan. He is adherent to medications, denies negative side effects. Reeducated on RABs of haldol, pt agreeable to resume treatment. In behavioral control. Continues on Covid19 isolation.

## 2024-01-23 NOTE — BH INPATIENT PSYCHIATRY PROGRESS NOTE - NSBHCHARTREVIEWVS_PSY_A_CORE FT
Vital Signs Last 24 Hrs  T(C): 36 (01-23-24 @ 08:08), Max: 38.6 (01-22-24 @ 15:42)  T(F): 96.8 (01-23-24 @ 08:08), Max: 101.5 (01-22-24 @ 15:42)  HR: 83 (01-23-24 @ 08:08) (72 - 96)  BP: 100/64 (01-23-24 @ 08:08) (94/65 - 104/63)  BP(mean): --  RR: 17 (01-23-24 @ 08:08) (16 - 18)  SpO2: 98% (01-23-24 @ 08:08) (97% - 100%)     Vital Signs Last 24 Hrs  T(C): 36 (01-23-24 @ 08:08), Max: 38.6 (01-22-24 @ 15:42)  T(F): 96.8 (01-23-24 @ 08:08), Max: 101.5 (01-22-24 @ 15:42)  HR: 83 (01-23-24 @ 08:08) (72 - 96)  BP: 100/64 (01-23-24 @ 08:08) (94/65 - 104/63)  BP(mean): --  RR: 17 (01-23-24 @ 08:08) (16 - 18)  SpO2: 98% (01-23-24 @ 08:08) (97% - 100%)

## 2024-01-23 NOTE — BH INPATIENT PSYCHIATRY PROGRESS NOTE - CURRENT MEDICATION
MEDICATIONS  (STANDING):  benztropine 0.5 milliGRAM(s) Oral two times a day  haloperidol     Tablet 10 milliGRAM(s) Oral two times a day  mirtazapine 15 milliGRAM(s) Oral at bedtime    MEDICATIONS  (PRN):  acetaminophen     Tablet .. 650 milliGRAM(s) Oral every 6 hours PRN Temp greater or equal to 38C (100.4F), Mild Pain (1 - 3), Moderate Pain (4 - 6)  aluminum hydroxide/magnesium hydroxide/simethicone Suspension 30 milliLiter(s) Oral every 6 hours PRN Dyspepsia  diphenhydrAMINE 50 milliGRAM(s) Oral every 6 hours PRN Extrapyramidal prophylaxis  haloperidol     Tablet 5 milliGRAM(s) Oral every 6 hours PRN Agitation  hydrOXYzine hydrochloride 50 milliGRAM(s) Oral every 6 hours PRN anxiety/insomnia  LORazepam     Tablet 2 milliGRAM(s) Oral every 6 hours PRN physical aggression  melatonin 5 milliGRAM(s) Oral at bedtime PRN Insomnia  nicotine  Polacrilex Gum 2 milliGRAM(s) Oral every 2 hours PRN smoking cessation

## 2024-01-23 NOTE — BH INPATIENT PSYCHIATRY PROGRESS NOTE - NSBHMETABOLIC_PSY_ALL_CORE_FT
BMI: BMI (kg/m2): 24.6 (01-14-24 @ 12:21)  HbA1c: A1C with Estimated Average Glucose Result: 5.1 % (01-15-24 @ 08:34)    Glucose: POCT Blood Glucose.: 128 mg/dL (01-13-24 @ 04:49)    BP: 100/64 (01-23-24 @ 08:08) (94/64 - 108/68)Vital Signs Last 24 Hrs  T(C): 36 (01-23-24 @ 08:08), Max: 38.6 (01-22-24 @ 15:42)  T(F): 96.8 (01-23-24 @ 08:08), Max: 101.5 (01-22-24 @ 15:42)  HR: 83 (01-23-24 @ 08:08) (72 - 96)  BP: 100/64 (01-23-24 @ 08:08) (94/65 - 104/63)  BP(mean): --  RR: 17 (01-23-24 @ 08:08) (16 - 18)  SpO2: 98% (01-23-24 @ 08:08) (97% - 100%)      Lipid Panel: Date/Time: 01-15-24 @ 08:34  Cholesterol, Serum: 110  LDL Cholesterol Calculated: 39  HDL Cholesterol, Serum: 40  Total Cholesterol/HDL Ration Measurement: --  Triglycerides, Serum: 154   BMI: BMI (kg/m2): 24.6 (01-14-24 @ 12:21)  HbA1c: A1C with Estimated Average Glucose Result: 5.1 % (01-15-24 @ 08:34)    Glucose: POCT Blood Glucose.: 128 mg/dL (01-13-24 @ 04:49)    BP: 102/55 (01-23-24 @ 15:40) (94/64 - 108/68)Vital Signs Last 24 Hrs  T(C): 38.2 (01-23-24 @ 16:49), Max: 38.4 (01-22-24 @ 22:17)  T(F): 100.8 (01-23-24 @ 16:49), Max: 101.2 (01-22-24 @ 22:17)  HR: 76 (01-23-24 @ 15:40) (72 - 89)  BP: 102/55 (01-23-24 @ 15:40) (97/51 - 104/63)  BP(mean): --  RR: 16 (01-23-24 @ 15:40) (16 - 18)  SpO2: 98% (01-23-24 @ 08:08) (97% - 100%)      Lipid Panel: Date/Time: 01-15-24 @ 08:34  Cholesterol, Serum: 110  LDL Cholesterol Calculated: 39  HDL Cholesterol, Serum: 40  Total Cholesterol/HDL Ration Measurement: --  Triglycerides, Serum: 154

## 2024-01-23 NOTE — BH INPATIENT PSYCHIATRY PROGRESS NOTE - NSBHASSESSSUMMFT_PSY_ALL_CORE
43-year-old man,, single with no significant PMH and with PPH of schizoaffective disorder, substance use disorder (alcohol, cannabis, cocaine), substance induced psychosis, PTSD, multiple prior hospitalizations (most recently to [facility] in 11/2023), reported history of suicide attempts, prior legal history of drug-related charges, who was BIB self after trying to hang himself with a belt from command auditory hallucinations. Pt presented with depressed mood, anhedonia, and aborted suicide attempt via hanging self with a belt, in the context of financial stressors and feelings of hopelessness. The patient has history of multiple prior hospitalizations and ED visits, is a poor utilizer of outpatient services, and has history of substance abuse. Pt will likely benefit from IPP at this time.    On evaluation, pt presents alert, linear and AOx3, with improved focus and ADLs. Of note, overnight pt afebrile with concern of possible NMS, negative work up and night dose of haldol help. Pt currently afebrile and denying any physical complaints, no rigidity on exam. Pt reports worsened AH today, states similar to admission, constant and telling him to harm self. He denies intent and plan to harm self. He is future-oriented, requesting to speak with SW regarding housing and social security benefits. Of note,     #Schizoaffective d/o  -c/w haldol 10 mg bid  -c/w cogentin 0.5 mg bid for eps ppx  -start remeron 15 mg qhs     #H/o polysubstance use  -UDS pending    #Agitation  -for agitation not amenable to verbal redirection, may give haldol 5 mg q6h prn and ativan 2 mg q6h prn with escalation to IM if pt is refusing PO and is an acute danger to self or/and others with repeat EKG to ensure QTc <500 ms     43-year-old man,, single with no significant PMH and with PPH of schizoaffective disorder, substance use disorder (alcohol, cannabis, cocaine), substance induced psychosis, PTSD, multiple prior hospitalizations (most recently to [facility] in 11/2023), reported history of suicide attempts, prior legal history of drug-related charges, who was BIB self after trying to hang himself with a belt from command auditory hallucinations. Pt presented with depressed mood, anhedonia, and aborted suicide attempt via hanging self with a belt, in the context of financial stressors and feelings of hopelessness. The patient has history of multiple prior hospitalizations and ED visits, is a poor utilizer of outpatient services, and has history of substance abuse. Pt will likely benefit from IPP at this time.    On evaluation, pt presents alert, linear and AOx3, with improved focus and ADLs. Of note, overnight pt afebrile with concern of possible NMS, negative work up and night dose of haldol help. Pt currently afebrile and denying any physical complaints, no rigidity on exam. Pt reports worsened AH today, states similar to admission, constant and telling him to harm self. He denies intent and plan to harm self. He is future-oriented, requesting to speak with SW regarding housing and social security benefits.     #Schizoaffective d/o  -c/w haldol 10 mg bid  -c/w cogentin 0.5 mg bid for eps ppx  -start remeron 15 mg qhs     #H/o polysubstance use  -UDS pending    #Agitation  -for agitation not amenable to verbal redirection, may give haldol 5 mg q6h prn and ativan 2 mg q6h prn with escalation to IM if pt is refusing PO and is an acute danger to self or/and others with repeat EKG to ensure QTc <500 ms

## 2024-01-24 LAB
ALBUMIN SERPL ELPH-MCNC: 4 G/DL — SIGNIFICANT CHANGE UP (ref 3.5–5.2)
ALP SERPL-CCNC: 137 U/L — HIGH (ref 30–115)
ALT FLD-CCNC: 148 U/L — HIGH (ref 0–41)
ANION GAP SERPL CALC-SCNC: 11 MMOL/L — SIGNIFICANT CHANGE UP (ref 7–14)
AST SERPL-CCNC: 105 U/L — HIGH (ref 0–41)
BILIRUB SERPL-MCNC: 0.8 MG/DL — SIGNIFICANT CHANGE UP (ref 0.2–1.2)
BUN SERPL-MCNC: 11 MG/DL — SIGNIFICANT CHANGE UP (ref 10–20)
CALCIUM SERPL-MCNC: 9.4 MG/DL — SIGNIFICANT CHANGE UP (ref 8.4–10.5)
CHLORIDE SERPL-SCNC: 99 MMOL/L — SIGNIFICANT CHANGE UP (ref 98–110)
CO2 SERPL-SCNC: 28 MMOL/L — SIGNIFICANT CHANGE UP (ref 17–32)
CREAT SERPL-MCNC: 1 MG/DL — SIGNIFICANT CHANGE UP (ref 0.7–1.5)
EGFR: 97 ML/MIN/1.73M2 — SIGNIFICANT CHANGE UP
GLUCOSE SERPL-MCNC: 142 MG/DL — HIGH (ref 70–99)
HCT VFR BLD CALC: 41.7 % — LOW (ref 42–52)
HGB BLD-MCNC: 13.9 G/DL — LOW (ref 14–18)
MCHC RBC-ENTMCNC: 26.5 PG — LOW (ref 27–31)
MCHC RBC-ENTMCNC: 33.3 G/DL — SIGNIFICANT CHANGE UP (ref 32–37)
MCV RBC AUTO: 79.6 FL — LOW (ref 80–94)
NRBC # BLD: 0 /100 WBCS — SIGNIFICANT CHANGE UP (ref 0–0)
PLATELET # BLD AUTO: 87 K/UL — LOW (ref 130–400)
PMV BLD: 10.1 FL — SIGNIFICANT CHANGE UP (ref 7.4–10.4)
POTASSIUM SERPL-MCNC: 4.2 MMOL/L — SIGNIFICANT CHANGE UP (ref 3.5–5)
POTASSIUM SERPL-SCNC: 4.2 MMOL/L — SIGNIFICANT CHANGE UP (ref 3.5–5)
PROT SERPL-MCNC: 6.1 G/DL — SIGNIFICANT CHANGE UP (ref 6–8)
RBC # BLD: 5.24 M/UL — SIGNIFICANT CHANGE UP (ref 4.7–6.1)
RBC # FLD: 13.2 % — SIGNIFICANT CHANGE UP (ref 11.5–14.5)
SODIUM SERPL-SCNC: 138 MMOL/L — SIGNIFICANT CHANGE UP (ref 135–146)
WBC # BLD: 2.91 K/UL — LOW (ref 4.8–10.8)
WBC # FLD AUTO: 2.91 K/UL — LOW (ref 4.8–10.8)

## 2024-01-24 PROCEDURE — 99231 SBSQ HOSP IP/OBS SF/LOW 25: CPT

## 2024-01-24 PROCEDURE — 71046 X-RAY EXAM CHEST 2 VIEWS: CPT | Mod: 26

## 2024-01-24 RX ADMIN — MIRTAZAPINE 15 MILLIGRAM(S): 45 TABLET, ORALLY DISINTEGRATING ORAL at 20:23

## 2024-01-24 RX ADMIN — Medication 0.5 MILLIGRAM(S): at 08:23

## 2024-01-24 RX ADMIN — HALOPERIDOL DECANOATE 10 MILLIGRAM(S): 100 INJECTION INTRAMUSCULAR at 08:23

## 2024-01-24 RX ADMIN — Medication 0.5 MILLIGRAM(S): at 20:22

## 2024-01-24 NOTE — CHART NOTE - NSCHARTNOTEFT_GEN_A_CORE
was called by RN to review labs   wbc 2.91 from 7.29 on admission   plt 87 from 114  LFT slighly elevated   pt covid positive with intermittent fever  seen by ID today   cxr performed pending reports  rvp in process  no bleeding per stuff     vital stable   ICU Vital Signs Last 24 Hrs  T(C): 37.3 (24 Jan 2024 15:52), Max: 37.3 (24 Jan 2024 15:52)  T(F): 99.1 (24 Jan 2024 15:52), Max: 99.1 (24 Jan 2024 15:52)  HR: 98 (24 Jan 2024 15:52) (78 - 98)  BP: 117/64 (24 Jan 2024 15:52) (115/67 - 117/64)  RR: 16 (24 Jan 2024 15:52) (16 - 18)      A/P   Leukopenia and thrombocytopenia possibly  reactive to Covid infection    -will repeat cbc, cmp in am including coags  - if am labs show leukopenia and thrombocytopenia again  reconsult hospitalist for further work up was called by RN to review labs   wbc 2.91 from 7.29 on admission   plt 87 from 114  LFT slighly elevated   pt covid positive with intermittent fever  seen by ID today   cxr performed pending reports  rvp in process  no bleeding per stuff     vital stable   ICU Vital Signs Last 24 Hrs  T(C): 37.3 (24 Jan 2024 15:52), Max: 37.3 (24 Jan 2024 15:52)  T(F): 99.1 (24 Jan 2024 15:52), Max: 99.1 (24 Jan 2024 15:52)  HR: 98 (24 Jan 2024 15:52) (78 - 98)  BP: 117/64 (24 Jan 2024 15:52) (115/67 - 117/64)  RR: 16 (24 Jan 2024 15:52) (16 - 18)      A/P   Leukopenia and thrombocytopenia possibly  reactive to Covid infection  vs medication reaction like haldo  -will repeat cbc, cmp in am including coags  - if am labs show leukopenia and thrombocytopenia again  reconsult hospitalist for further work up  - psychiatry to review meds

## 2024-01-24 NOTE — BH INPATIENT PSYCHIATRY PROGRESS NOTE - PRN MEDS
MEDICATIONS  (PRN):  acetaminophen     Tablet .. 650 milliGRAM(s) Oral every 6 hours PRN Temp greater or equal to 38C (100.4F), Mild Pain (1 - 3), Moderate Pain (4 - 6)  aluminum hydroxide/magnesium hydroxide/simethicone Suspension 30 milliLiter(s) Oral every 6 hours PRN Dyspepsia  diphenhydrAMINE 50 milliGRAM(s) Oral every 6 hours PRN Extrapyramidal prophylaxis  haloperidol     Tablet 5 milliGRAM(s) Oral every 6 hours PRN Agitation  hydrOXYzine hydrochloride 50 milliGRAM(s) Oral every 6 hours PRN anxiety/insomnia  melatonin 5 milliGRAM(s) Oral at bedtime PRN Insomnia  nicotine  Polacrilex Gum 2 milliGRAM(s) Oral every 2 hours PRN smoking cessation

## 2024-01-24 NOTE — BH INPATIENT PSYCHIATRY PROGRESS NOTE - NSBHCHARTREVIEWVS_PSY_A_CORE FT
Vital Signs Last 24 Hrs  T(C): 35.8 (01-24-24 @ 08:25), Max: 38.4 (01-23-24 @ 15:40)  T(F): 96.4 (01-24-24 @ 08:25), Max: 101.1 (01-23-24 @ 15:40)  HR: 78 (01-24-24 @ 08:25) (76 - 78)  BP: 115/67 (01-24-24 @ 08:25) (102/55 - 115/67)  BP(mean): --  RR: 18 (01-24-24 @ 08:25) (16 - 18)  SpO2: --

## 2024-01-24 NOTE — BH INPATIENT PSYCHIATRY PROGRESS NOTE - NSBHASSESSSUMMFT_PSY_ALL_CORE
43-year-old man,, single with no significant PMH and with PPH of schizoaffective disorder, substance use disorder (alcohol, cannabis, cocaine), substance induced psychosis, PTSD, multiple prior hospitalizations (most recently to [facility] in 11/2023), reported history of suicide attempts, prior legal history of drug-related charges, who was BIB self after trying to hang himself with a belt from command auditory hallucinations. Pt presented with depressed mood, anhedonia, and aborted suicide attempt via hanging self with a belt, in the context of financial stressors and feelings of hopelessness. The patient has history of multiple prior hospitalizations and ED visits, is a poor utilizer of outpatient services, and has history of substance abuse. Pt will likely benefit from IPP at this time.    On evaluation, pt presents alert, linear and AOx3, with improved focus and ADLs. Of note, pt continues with intermittent fevers, negative NMS w/u, pending ID consult. Pt currently afebrile and denying any physical complaints, no rigidity on exam. Pt reports continued AH today telling him to harm self. He denies intent and plan to harm self. He is future-oriented, requesting to speak with SW regarding housing and social security benefits.     #Schizoaffective d/o  -c/w haldol 10 mg bid  -c/w cogentin 0.5 mg bid for eps ppx  -c/w remeron 15 mg qhs (1/23)    #H/o polysubstance use  -pt denies recent use  -UDS unable to be collected    #Agitation  -for agitation not amenable to verbal redirection, may give haldol 5 mg q6h prn and ativan 2 mg q6h prn with escalation to IM if pt is refusing PO and is an acute danger to self or/and others with repeat EKG to ensure QTc <500 ms

## 2024-01-24 NOTE — BH INPATIENT PSYCHIATRY PROGRESS NOTE - NSBHFUPINTERVALHXFT_PSY_A_CORE
Pt seen and evaluated, chart reviewed. As per nursing report, pt continues with intermittent fevers yesterday, responsive to PRN tylenol, afebrile this morning, pending ID consult; no other acute events. On evaluation, pt presents alert, linear and AOx3, calm and cooperative, smiling at times during interview. He reports he is "somewhat better" with resolution of physical complaints. He reports AH "same" telling him to "kill myself". He denies intent or plan to harm self. Pt is future-oriented and requesting to speak with SW, states he has to f/u with his social security benefits. Pt reports reasons to live include his family. Denies VH. Denies paranoia. He denies SI/HI, intent and plan. He is adherent to medications, denies negative side effects. In behavioral control.

## 2024-01-24 NOTE — CONSULT NOTE ADULT - SUBJECTIVE AND OBJECTIVE BOX
INFECTIOUS DISEASE CONSULT NOTE    Patient is a 43y old  Male who presents with a chief complaint of Psychosis (15 Fred 2024 12:08)    HPI:  43-year-old man,, single with no significant PMH and with PPH of schizoaffective disorder, substance use disorder (alcohol, cannabis, cocaine), substance induced psychosis, PTSD, multiple prior hospitalizations (most recently to [facility] in 11/2023), reported history of suicide attempts, prior legal history of drug-related charges, who was BIB self after trying to hang himself with a belt from command auditory hallucinations.    He was found to have COVID on admission, asymptomatic. No Remdesivir for Decadron was given. CXR showed no PNA. However on 1/22 he started to spiked fever again, highest 101.5. Also endorses generalized body ache. Saturating well on RA.     ID is consulted for fever.      Prior hospital charts reviewed [Yes]  Primary team notes reviewed [Yes]  Other consultant notes reviewed [Yes]    REVIEW OF SYSTEMS:      PAST MEDICAL & SURGICAL HISTORY:  Schizophrenia      Tobacco abuse      No pertinent past medical history      S/P appendectomy      No significant past surgical history          SOCIAL HISTORY:  - Born in _____, migrated [place] in 19XX  - Currently working as / Retired  - Lives with _____; no pets  - No recent travel  - Denies tobacco use  - Denies alcohol use  - Denies illicit drug use  - Currently sexually active, has one male/female sexual partner    FAMILY HISTORY:      Allergies:  No Known Allergies      ANTIMICROBIALS:      ANTIMICROBIALS (past 90 days):  MEDICATIONS  (STANDING):        OTHER MEDS:   MEDICATIONS  (STANDING):  acetaminophen     Tablet .. 650 every 6 hours PRN  aluminum hydroxide/magnesium hydroxide/simethicone Suspension 30 every 6 hours PRN  benztropine 0.5 two times a day  diphenhydrAMINE 50 every 6 hours PRN  haloperidol     Tablet 5 every 6 hours PRN  haloperidol     Tablet 10 two times a day  hydrOXYzine hydrochloride 50 every 6 hours PRN  melatonin 5 at bedtime PRN  mirtazapine 15 at bedtime      VITALS:  Vital Signs Last 24 Hrs  T(F): 96.4 (01-24-24 @ 08:25), Max: 101.5 (01-22-24 @ 15:42)    Vital Signs Last 24 Hrs  HR: 78 (01-24-24 @ 08:25) (76 - 78)  BP: 115/67 (01-24-24 @ 08:25) (102/55 - 115/67)  RR: 18 (01-24-24 @ 08:25)  SpO2: --  Wt(kg): --    EXAM:    Labs:                        15.1   7.29  )-----------( 114      ( 22 Jan 2024 17:02 )             43.4     01-22    135  |  98  |  10  ----------------------------<  157<H>  4.1   |  23  |  1.2    Ca    9.7      22 Jan 2024 17:02    TPro  6.3  /  Alb  4.3  /  TBili  0.9  /  DBili  x   /  AST  24  /  ALT  34  /  AlkPhos  80  01-22      WBC Trend:  WBC Count: 7.29 (01-22-24 @ 17:02)      Auto Neutrophil #: 6.29 K/uL (01-22-24 @ 17:02)  Auto Neutrophil #: 4.81 K/uL (01-13-24 @ 00:00)  Auto Neutrophil #: 3.75 K/uL (07-10-23 @ 07:10)  Band Neutrophils %: 0.9 % (07-10-23 @ 07:10)  Auto Neutrophil #: 4.87 K/uL (07-07-23 @ 01:05)      Creatine Trend:  Creatinine: 1.2 (01-22)      Liver Biochemical Testing Trend:  Alanine Aminotransferase (ALT/SGPT): 34 (01-22)  Alanine Aminotransferase (ALT/SGPT): 18 (01-13)  Alanine Aminotransferase (ALT/SGPT): 20 (07-10)  Alanine Aminotransferase (ALT/SGPT): 23 (07-07)  Alanine Aminotransferase (ALT/SGPT): 14 (04-08)  Aspartate Aminotransferase (AST/SGOT): 24 (01-22-24 @ 17:02)  Aspartate Aminotransferase (AST/SGOT): 21 (01-13-24 @ 00:00)  Aspartate Aminotransferase (AST/SGOT): 14 (07-10-23 @ 07:10)  Aspartate Aminotransferase (AST/SGOT): 17 (07-07-23 @ 01:05)  Aspartate Aminotransferase (AST/SGOT): 14 (04-08-23 @ 01:25)  Bilirubin Total: 0.9 (01-22)  Bilirubin Direct: 0.3 (01-13)  Bilirubin Total: 1.7 (01-13)  Bilirubin Total: 0.7 (07-10)  Bilirubin Total: 0.9 (07-07)      Trend LDH      Auto Eosinophil %: 0.5 % (01-22-24 @ 17:02)      Urinalysis Basic - ( 22 Jan 2024 17:02 )    Color: x / Appearance: x / SG: x / pH: x  Gluc: 157 mg/dL / Ketone: x  / Bili: x / Urobili: x   Blood: x / Protein: x / Nitrite: x   Leuk Esterase: x / RBC: x / WBC x   Sq Epi: x / Non Sq Epi: x / Bacteria: x        MICROBIOLOGY:    COVID-19 PCR: Detected (01-13-24 @ 09:53)    COVID-19 Flip Domain AB Interp: Positive (07-10-23 @ 07:10)      A1C with Estimated Average Glucose Result: 5.1 % (01-15-24 @ 08:34)      RADIOLOGY:  imaging below personally reviewed   INFECTIOUS DISEASE CONSULT NOTE    Patient is a 43y old  Male who presents with a chief complaint of Psychosis (15 Fred 2024 12:08)    HPI:  43-year-old man,, single with no significant PMH and with PPH of schizoaffective disorder, substance use disorder (alcohol, cannabis, cocaine), substance induced psychosis, PTSD, multiple prior hospitalizations (most recently to [facility] in 11/2023), reported history of suicide attempts, prior legal history of drug-related charges, who was BIB self after trying to hang himself with a belt from command auditory hallucinations.    He was found to have COVID on admission, asymptomatic. No Remdesivir for Decadron was given. CXR showed no PNA. However on 1/22 he started to spiked fever again, highest 101.5. Also endorses generalized body ache. Saturating well on RA.     ID is consulted for fever.      Prior hospital charts reviewed [Yes]  Primary team notes reviewed [Yes]  Other consultant notes reviewed [Yes]    REVIEW OF SYSTEMS:  CONSTITUTIONAL: No fever or chills  HEAD: No lesion on scalp  EYES: No visual disturbance  ENT: No sore throat  RESPIRATORY: No cough, no shortness of breath  CARDIOVASCULAR: No chest pain or palpitations  GASTROINTESTINAL: No abdominal or epigastric pain  GENITOURINARY: No dysuria  NEUROLOGICAL: Mild headache this morning  MUSCULOSKELETAL: No joint pain, erythema, or swelling; no back pain  SKIN: No itching, rashes  All other ROS negative except noted above      PAST MEDICAL & SURGICAL HISTORY:  Schizophrenia      Tobacco abuse      No pertinent past medical history      S/P appendectomy      No significant past surgical history          SOCIAL HISTORY:  - No recent travel  - Denies tobacco use  - Reports alcohol use  - Reports cannabis, cocaine use    FAMILY HISTORY:  Denies any family history of psychiatric diseases    Allergies:  No Known Allergies      ANTIMICROBIALS:      ANTIMICROBIALS (past 90 days):  MEDICATIONS  (STANDING):        OTHER MEDS:   MEDICATIONS  (STANDING):  acetaminophen     Tablet .. 650 every 6 hours PRN  aluminum hydroxide/magnesium hydroxide/simethicone Suspension 30 every 6 hours PRN  benztropine 0.5 two times a day  diphenhydrAMINE 50 every 6 hours PRN  haloperidol     Tablet 5 every 6 hours PRN  haloperidol     Tablet 10 two times a day  hydrOXYzine hydrochloride 50 every 6 hours PRN  melatonin 5 at bedtime PRN  mirtazapine 15 at bedtime      VITALS:  Vital Signs Last 24 Hrs  T(F): 96.4 (01-24-24 @ 08:25), Max: 101.5 (01-22-24 @ 15:42)    Vital Signs Last 24 Hrs  HR: 78 (01-24-24 @ 08:25) (76 - 78)  BP: 115/67 (01-24-24 @ 08:25) (102/55 - 115/67)  RR: 18 (01-24-24 @ 08:25)  SpO2: --  Wt(kg): --    EXAM:  GENERAL: NAD, lying in bed  HEAD: No head lesions  EYES: Conjunctiva pink and cornea white  EAR, NOSE, MOUTH, THROAT: Normal external ears and nose, no discharges; moist mucous membranes  NECK: Supple, nontender to palpation; no JVD  RESPIRATORY: Clear to auscultation bilaterally  CARDIOVASCULAR: S1 S2  GASTROINTESTINAL: Soft, nontender, nondistended; normoactive bowel sounds  EXTREMITIES: No clubbing, cyanosis, or petal edema  NERVOUS SYSTEM: Alert and oriented to person, time, place and situation, speech clear. No focal deficits   MUSCULOSKELETAL: No joint erythema, swelling or pain  SKIN: Dry skins on bilateral LEs, no superficial thrombophlebitis  PSYCH: Normal affect    Labs:                        15.1   7.29  )-----------( 114      ( 22 Jan 2024 17:02 )             43.4     01-22    135  |  98  |  10  ----------------------------<  157<H>  4.1   |  23  |  1.2    Ca    9.7      22 Jan 2024 17:02    TPro  6.3  /  Alb  4.3  /  TBili  0.9  /  DBili  x   /  AST  24  /  ALT  34  /  AlkPhos  80  01-22      WBC Trend:  WBC Count: 7.29 (01-22-24 @ 17:02)      Auto Neutrophil #: 6.29 K/uL (01-22-24 @ 17:02)  Auto Neutrophil #: 4.81 K/uL (01-13-24 @ 00:00)  Auto Neutrophil #: 3.75 K/uL (07-10-23 @ 07:10)  Band Neutrophils %: 0.9 % (07-10-23 @ 07:10)  Auto Neutrophil #: 4.87 K/uL (07-07-23 @ 01:05)      Creatine Trend:  Creatinine: 1.2 (01-22)      Liver Biochemical Testing Trend:  Alanine Aminotransferase (ALT/SGPT): 34 (01-22)  Alanine Aminotransferase (ALT/SGPT): 18 (01-13)  Alanine Aminotransferase (ALT/SGPT): 20 (07-10)  Alanine Aminotransferase (ALT/SGPT): 23 (07-07)  Alanine Aminotransferase (ALT/SGPT): 14 (04-08)  Aspartate Aminotransferase (AST/SGOT): 24 (01-22-24 @ 17:02)  Aspartate Aminotransferase (AST/SGOT): 21 (01-13-24 @ 00:00)  Aspartate Aminotransferase (AST/SGOT): 14 (07-10-23 @ 07:10)  Aspartate Aminotransferase (AST/SGOT): 17 (07-07-23 @ 01:05)  Aspartate Aminotransferase (AST/SGOT): 14 (04-08-23 @ 01:25)  Bilirubin Total: 0.9 (01-22)  Bilirubin Direct: 0.3 (01-13)  Bilirubin Total: 1.7 (01-13)  Bilirubin Total: 0.7 (07-10)  Bilirubin Total: 0.9 (07-07)      Trend LDH      Auto Eosinophil %: 0.5 % (01-22-24 @ 17:02)      Urinalysis Basic - ( 22 Jan 2024 17:02 )    Color: x / Appearance: x / SG: x / pH: x  Gluc: 157 mg/dL / Ketone: x  / Bili: x / Urobili: x   Blood: x / Protein: x / Nitrite: x   Leuk Esterase: x / RBC: x / WBC x   Sq Epi: x / Non Sq Epi: x / Bacteria: x        MICROBIOLOGY:    COVID-19 PCR: Detected (01-13-24 @ 09:53)    COVID-19 Flip Domain AB Interp: Positive (07-10-23 @ 07:10)      A1C with Estimated Average Glucose Result: 5.1 % (01-15-24 @ 08:34)      RADIOLOGY:  imaging below personally reviewed    < from: Xray Chest 2 Views PA/Lat (01.12.24 @ 23:13) >  Findings:    Lines/ Support devices: none   Cardiac/mediastinum/hilum: Unremarkable    Lung parenchyma/Pleura:  No consolidation, effusions or pneumothorax.      Skeletal/soft tissues: No suspicious osseous lesions.      Fluid-filled stomach.      Impression:  No radiographic evidence of cardiopulmonary disease      < end of copied text >

## 2024-01-24 NOTE — CONSULT NOTE ADULT - ASSESSMENT
43-year-old man,, single with no significant PMH and with PPH of schizoaffective disorder, substance use disorder (alcohol, cannabis, cocaine), substance induced psychosis, PTSD, multiple prior hospitalizations (most recently to [facility] in 11/2023), reported history of suicide attempts, prior legal history of drug-related charges, who was BIB self after trying to hang himself with a belt from command auditory hallucinations.    ID is consulted for fever  Afebrile on admission, no leukocytosis  Found to have COVID, but asymptomatic  S/p isolation for 10 days, no RDV or Decadron given  Initial CXR showed no PNA  Started spiking fever on 1/22 with body ache  No other localizing symptoms today      IMPRESSION:  Likely viral syndrome  COVID  Schizophrenia  Substance use disorder    RECOMMENDATIONS:  - Observe off abx for now  - Repeat CBC, CMP, 2 sets of BCx, RVP and CXR  - No urinary symptoms so no need for UA  - Maintain airborne isolation for now  - Continue to monitor symptoms  - Psychiatric care as per , monitor adverse effects on psych meds      Linda Canela D.O.  Attending Physician  Division of Infectious Diseases  [facility]  Please contact me via [employer] Teams  
Patient is a 43y old  Male who presents with a chief complaint of Psychotic Behavior     Medicine consulted given new IPP admission   Patient denies any medical history   -Vitals and labs grossly normal   -Positive COVID, asymptomatic   -Medicine will sign off, recall PRN

## 2024-01-24 NOTE — BH INPATIENT PSYCHIATRY PROGRESS NOTE - NSBHMETABOLIC_PSY_ALL_CORE_FT
BMI: BMI (kg/m2): 24.6 (01-14-24 @ 12:21)  HbA1c: A1C with Estimated Average Glucose Result: 5.1 % (01-15-24 @ 08:34)    Glucose: POCT Blood Glucose.: 128 mg/dL (01-13-24 @ 04:49)    BP: 115/67 (01-24-24 @ 08:25) (94/65 - 115/67)Vital Signs Last 24 Hrs  T(C): 35.8 (01-24-24 @ 08:25), Max: 38.4 (01-23-24 @ 15:40)  T(F): 96.4 (01-24-24 @ 08:25), Max: 101.1 (01-23-24 @ 15:40)  HR: 78 (01-24-24 @ 08:25) (76 - 78)  BP: 115/67 (01-24-24 @ 08:25) (102/55 - 115/67)  BP(mean): --  RR: 18 (01-24-24 @ 08:25) (16 - 18)  SpO2: --      Lipid Panel: Date/Time: 01-15-24 @ 08:34  Cholesterol, Serum: 110  LDL Cholesterol Calculated: 39  HDL Cholesterol, Serum: 40  Total Cholesterol/HDL Ration Measurement: --  Triglycerides, Serum: 154

## 2024-01-24 NOTE — BH INPATIENT PSYCHIATRY PROGRESS NOTE - CURRENT MEDICATION
MEDICATIONS  (STANDING):  benztropine 0.5 milliGRAM(s) Oral two times a day  haloperidol     Tablet 10 milliGRAM(s) Oral two times a day  mirtazapine 15 milliGRAM(s) Oral at bedtime    MEDICATIONS  (PRN):  acetaminophen     Tablet .. 650 milliGRAM(s) Oral every 6 hours PRN Temp greater or equal to 38C (100.4F), Mild Pain (1 - 3), Moderate Pain (4 - 6)  aluminum hydroxide/magnesium hydroxide/simethicone Suspension 30 milliLiter(s) Oral every 6 hours PRN Dyspepsia  diphenhydrAMINE 50 milliGRAM(s) Oral every 6 hours PRN Extrapyramidal prophylaxis  haloperidol     Tablet 5 milliGRAM(s) Oral every 6 hours PRN Agitation  hydrOXYzine hydrochloride 50 milliGRAM(s) Oral every 6 hours PRN anxiety/insomnia  melatonin 5 milliGRAM(s) Oral at bedtime PRN Insomnia  nicotine  Polacrilex Gum 2 milliGRAM(s) Oral every 2 hours PRN smoking cessation

## 2024-01-25 LAB
ALBUMIN SERPL ELPH-MCNC: 4.1 G/DL — SIGNIFICANT CHANGE UP (ref 3.5–5.2)
ALBUMIN SERPL ELPH-MCNC: 4.1 G/DL — SIGNIFICANT CHANGE UP (ref 3.5–5.2)
ALP SERPL-CCNC: 160 U/L — HIGH (ref 30–115)
ALP SERPL-CCNC: 176 U/L — HIGH (ref 30–115)
ALT FLD-CCNC: 177 U/L — HIGH (ref 0–41)
ALT FLD-CCNC: 183 U/L — HIGH (ref 0–41)
ANION GAP SERPL CALC-SCNC: 9 MMOL/L — SIGNIFICANT CHANGE UP (ref 7–14)
ANION GAP SERPL CALC-SCNC: 9 MMOL/L — SIGNIFICANT CHANGE UP (ref 7–14)
APTT BLD: 30.3 SEC — SIGNIFICANT CHANGE UP (ref 27–39.2)
AST SERPL-CCNC: 113 U/L — HIGH (ref 0–41)
AST SERPL-CCNC: 98 U/L — HIGH (ref 0–41)
BASOPHILS # BLD AUTO: 0.03 K/UL — SIGNIFICANT CHANGE UP (ref 0–0.2)
BASOPHILS # BLD AUTO: 0.05 K/UL — SIGNIFICANT CHANGE UP (ref 0–0.2)
BASOPHILS NFR BLD AUTO: 0.8 % — SIGNIFICANT CHANGE UP (ref 0–1)
BASOPHILS NFR BLD AUTO: 2 % — HIGH (ref 0–1)
BILIRUB DIRECT SERPL-MCNC: 0.2 MG/DL — SIGNIFICANT CHANGE UP (ref 0–0.3)
BILIRUB INDIRECT FLD-MCNC: 0.6 MG/DL — SIGNIFICANT CHANGE UP (ref 0.2–1.2)
BILIRUB SERPL-MCNC: 0.7 MG/DL — SIGNIFICANT CHANGE UP (ref 0.2–1.2)
BILIRUB SERPL-MCNC: 0.8 MG/DL — SIGNIFICANT CHANGE UP (ref 0.2–1.2)
BUN SERPL-MCNC: 11 MG/DL — SIGNIFICANT CHANGE UP (ref 10–20)
BUN SERPL-MCNC: 14 MG/DL — SIGNIFICANT CHANGE UP (ref 10–20)
CALCIUM SERPL-MCNC: 9.3 MG/DL — SIGNIFICANT CHANGE UP (ref 8.4–10.5)
CALCIUM SERPL-MCNC: 9.8 MG/DL — SIGNIFICANT CHANGE UP (ref 8.4–10.5)
CHLORIDE SERPL-SCNC: 97 MMOL/L — LOW (ref 98–110)
CHLORIDE SERPL-SCNC: 98 MMOL/L — SIGNIFICANT CHANGE UP (ref 98–110)
CO2 SERPL-SCNC: 28 MMOL/L — SIGNIFICANT CHANGE UP (ref 17–32)
CO2 SERPL-SCNC: 30 MMOL/L — SIGNIFICANT CHANGE UP (ref 17–32)
CREAT SERPL-MCNC: 1.1 MG/DL — SIGNIFICANT CHANGE UP (ref 0.7–1.5)
CREAT SERPL-MCNC: 1.1 MG/DL — SIGNIFICANT CHANGE UP (ref 0.7–1.5)
EGFR: 86 ML/MIN/1.73M2 — SIGNIFICANT CHANGE UP
EGFR: 86 ML/MIN/1.73M2 — SIGNIFICANT CHANGE UP
EOSINOPHIL # BLD AUTO: 0.2 K/UL — SIGNIFICANT CHANGE UP (ref 0–0.7)
EOSINOPHIL # BLD AUTO: 0.2 K/UL — SIGNIFICANT CHANGE UP (ref 0–0.7)
EOSINOPHIL NFR BLD AUTO: 5 % — SIGNIFICANT CHANGE UP (ref 0–8)
EOSINOPHIL NFR BLD AUTO: 8 % — SIGNIFICANT CHANGE UP (ref 0–8)
GLUCOSE SERPL-MCNC: 131 MG/DL — HIGH (ref 70–99)
GLUCOSE SERPL-MCNC: 155 MG/DL — HIGH (ref 70–99)
HCT VFR BLD CALC: 42.1 % — SIGNIFICANT CHANGE UP (ref 42–52)
HCT VFR BLD CALC: 43.7 % — SIGNIFICANT CHANGE UP (ref 42–52)
HGB BLD-MCNC: 13.9 G/DL — LOW (ref 14–18)
HGB BLD-MCNC: 14.5 G/DL — SIGNIFICANT CHANGE UP (ref 14–18)
IMM GRANULOCYTES NFR BLD AUTO: 2 % — HIGH (ref 0.1–0.3)
INR BLD: 0.96 RATIO — SIGNIFICANT CHANGE UP (ref 0.65–1.3)
LYMPHOCYTES # BLD AUTO: 0.25 K/UL — LOW (ref 1.2–3.4)
LYMPHOCYTES # BLD AUTO: 0.48 K/UL — LOW (ref 1.2–3.4)
LYMPHOCYTES # BLD AUTO: 10 % — LOW (ref 20.5–51.1)
LYMPHOCYTES # BLD AUTO: 12.1 % — LOW (ref 20.5–51.1)
MCHC RBC-ENTMCNC: 26.3 PG — LOW (ref 27–31)
MCHC RBC-ENTMCNC: 26.3 PG — LOW (ref 27–31)
MCHC RBC-ENTMCNC: 33 G/DL — SIGNIFICANT CHANGE UP (ref 32–37)
MCHC RBC-ENTMCNC: 33.2 G/DL — SIGNIFICANT CHANGE UP (ref 32–37)
MCV RBC AUTO: 79.3 FL — LOW (ref 80–94)
MCV RBC AUTO: 79.7 FL — LOW (ref 80–94)
MONOCYTES # BLD AUTO: 0.54 K/UL — SIGNIFICANT CHANGE UP (ref 0.1–0.6)
MONOCYTES # BLD AUTO: 0.85 K/UL — HIGH (ref 0.1–0.6)
MONOCYTES NFR BLD AUTO: 21.4 % — HIGH (ref 1.7–9.3)
MONOCYTES NFR BLD AUTO: 22 % — HIGH (ref 1.7–9.3)
NEUTROPHILS # BLD AUTO: 1.43 K/UL — SIGNIFICANT CHANGE UP (ref 1.4–6.5)
NEUTROPHILS # BLD AUTO: 2.33 K/UL — SIGNIFICANT CHANGE UP (ref 1.4–6.5)
NEUTROPHILS NFR BLD AUTO: 54 % — SIGNIFICANT CHANGE UP (ref 42.2–75.2)
NEUTROPHILS NFR BLD AUTO: 58.7 % — SIGNIFICANT CHANGE UP (ref 42.2–75.2)
NRBC # BLD: 0 /100 WBCS — SIGNIFICANT CHANGE UP (ref 0–0)
NRBC # BLD: 0 /100 WBCS — SIGNIFICANT CHANGE UP (ref 0–0)
PLATELET # BLD AUTO: 101 K/UL — LOW (ref 130–400)
PLATELET # BLD AUTO: 92 K/UL — LOW (ref 130–400)
PMV BLD: 10.1 FL — SIGNIFICANT CHANGE UP (ref 7.4–10.4)
PMV BLD: 9.8 FL — SIGNIFICANT CHANGE UP (ref 7.4–10.4)
POTASSIUM SERPL-MCNC: 4.2 MMOL/L — SIGNIFICANT CHANGE UP (ref 3.5–5)
POTASSIUM SERPL-MCNC: 4.4 MMOL/L — SIGNIFICANT CHANGE UP (ref 3.5–5)
POTASSIUM SERPL-SCNC: 4.2 MMOL/L — SIGNIFICANT CHANGE UP (ref 3.5–5)
POTASSIUM SERPL-SCNC: 4.4 MMOL/L — SIGNIFICANT CHANGE UP (ref 3.5–5)
PROT SERPL-MCNC: 6.3 G/DL — SIGNIFICANT CHANGE UP (ref 6–8)
PROT SERPL-MCNC: 6.3 G/DL — SIGNIFICANT CHANGE UP (ref 6–8)
PROTHROM AB SERPL-ACNC: 10.9 SEC — SIGNIFICANT CHANGE UP (ref 9.95–12.87)
RAPID RVP RESULT: SIGNIFICANT CHANGE UP
RBC # BLD: 5.28 M/UL — SIGNIFICANT CHANGE UP (ref 4.7–6.1)
RBC # BLD: 5.51 M/UL — SIGNIFICANT CHANGE UP (ref 4.7–6.1)
RBC # FLD: 13.2 % — SIGNIFICANT CHANGE UP (ref 11.5–14.5)
RBC # FLD: 13.3 % — SIGNIFICANT CHANGE UP (ref 11.5–14.5)
SARS-COV-2 RNA SPEC QL NAA+PROBE: SIGNIFICANT CHANGE UP
SODIUM SERPL-SCNC: 134 MMOL/L — LOW (ref 135–146)
SODIUM SERPL-SCNC: 137 MMOL/L — SIGNIFICANT CHANGE UP (ref 135–146)
WBC # BLD: 2.46 K/UL — LOW (ref 4.8–10.8)
WBC # BLD: 3.97 K/UL — LOW (ref 4.8–10.8)
WBC # FLD AUTO: 2.46 K/UL — LOW (ref 4.8–10.8)
WBC # FLD AUTO: 3.97 K/UL — LOW (ref 4.8–10.8)

## 2024-01-25 PROCEDURE — 76705 ECHO EXAM OF ABDOMEN: CPT | Mod: 26

## 2024-01-25 PROCEDURE — 99231 SBSQ HOSP IP/OBS SF/LOW 25: CPT

## 2024-01-25 NOTE — PROGRESS NOTE ADULT - ASSESSMENT
43-year-old man,, single with no significant PMH and with PPH of schizoaffective disorder, substance use disorder (alcohol, cannabis, cocaine), substance induced psychosis, PTSD, multiple prior hospitalizations (most recently to [facility] in 11/2023), reported history of suicide attempts, prior legal history of drug-related charges, who was BIB self after trying to hang himself with a belt from command auditory hallucinations.    ID is consulted for fever  Afebrile on admission, no leukocytosis  Found to have COVID, but asymptomatic  S/p isolation for 10 days, no RDV or Decadron given  Initial CXR showed no PNA  Started spiking fever on 1/22 with body ache  No other localizing symptoms  Repeat CXR no PNA  RVP negative      IMPRESSION:  Likely viral syndrome  COVID  Leukopenia  Thrombocytopenia  Transaminitis  Schizophrenia  Substance use disorder    RECOMMENDATIONS:  - Observe off abx for now  - Obtain HIV, acute hepatitis panel, HBsAb, HB core total Ab, CMV PCR, CMV serologies, EBV serologies  - Follow up BCx  - No urinary symptoms so no need for UA  - Maintain airborne isolation for now  - Continue to monitor symptoms  - Psychiatric care as per , monitor adverse effects on psych meds        * THIS IS AN INCOMPLETE NOTE. FINAL RECOMMENDATION IS PENDING *   43-year-old man,, single with no significant PMH and with PPH of schizoaffective disorder, substance use disorder (alcohol, cannabis, cocaine), substance induced psychosis, PTSD, multiple prior hospitalizations (most recently to [facility] in 11/2023), reported history of suicide attempts, prior legal history of drug-related charges, who was BIB self after trying to hang himself with a belt from command auditory hallucinations.    ID is consulted for fever  Afebrile on admission, no leukocytosis  Found to have COVID, but asymptomatic  S/p isolation for 10 days, no RDV or Decadron given  Initial CXR showed no PNA  Started spiking fever on 1/22 with body ache  No other localizing symptoms  Repeat CXR no PNA  RVP negative      IMPRESSION:  Likely viral syndrome  COVID  Leukopenia  Thrombocytopenia  Transaminitis  Schizophrenia  Substance use disorder    RECOMMENDATIONS:  - Bicytopenia, fever, transaminitis, DDx include viral syndrome, drug-induced, tick borne diseases  - Observe off abx for now  - Obtain HIV, acute hepatitis panel, HBsAb, HB core total Ab, CMV PCR, CMV serologies, EBV serologies, Lyme Vlse, Tick-borne disease panel, parasite smear  - Follow up BCx  - No urinary symptoms so no need for UA  - Continue to monitor symptoms  - Psychiatric care as per , monitor adverse effects on psych meds      Linda Canela D.O.  Attending Physician  Division of Infectious Diseases  [facility]  Please contact me via [employer] Teams

## 2024-01-25 NOTE — BH INPATIENT PSYCHIATRY PROGRESS NOTE - NSBHPROGMEDSE_PSY_A_CORE FT
mild neutropenia and thrombocytopenia, medications held, ID following leukopenia/neutropenia and thrombocytopenia, medications held, ID/medicine consults

## 2024-01-25 NOTE — BH INPATIENT PSYCHIATRY PROGRESS NOTE - NSBHFUPINTERVALHXFT_PSY_A_CORE
Pt seen and evaluated, chart reviewed; labs noted, medications held and pending further w/u. As per nursing report, pt continues with intermittent fevers yesterday, responsive to PRN tylenol, afebrile this morning, no other acute events. On evaluation, pt presents alert, linear and AOx3, calm and cooperative, smiling at times during interview. He reports he is physically feeling well, denies flu-like sx. He reports AH "same" telling him to "kill myself". He denies intent or plan to harm self. He is future-oriented and endorses hope of AH returning to baseline. Pt reports reasons to live include his family. Denies VH. Denies paranoia. He denies SI/HI, intent and plan. In behavioral control.  Pt seen and evaluated, chart reviewed; labs noted, medications held and pending consults/further w/u. As per nursing report, pt continues with intermittent fevers yesterday, responsive to PRN tylenol, afebrile this morning, no other acute events. On evaluation, pt presents alert, linear and AOx3, calm and cooperative, smiling at times during interview. He reports he is physically feeling well, denies flu-like sx. He reports AH "same" telling him to "kill myself". He denies intent or plan to harm self. He is future-oriented and endorses hope of AH returning to baseline. Pt reports reasons to live include his family. Denies VH. Denies paranoia. He denies SI/HI, intent and plan. In behavioral control.

## 2024-01-25 NOTE — BH INPATIENT PSYCHIATRY PROGRESS NOTE - NSBHCHARTREVIEWVS_PSY_A_CORE FT
Vital Signs Last 24 Hrs  T(C): 36.8 (01-25-24 @ 07:57), Max: 37.9 (01-24-24 @ 21:22)  T(F): 98.2 (01-25-24 @ 07:57), Max: 100.3 (01-24-24 @ 21:22)  HR: 81 (01-25-24 @ 07:57) (81 - 98)  BP: 108/55 (01-25-24 @ 07:57) (105/58 - 117/64)  BP(mean): --  RR: 18 (01-25-24 @ 07:57) (16 - 18)  SpO2: --

## 2024-01-25 NOTE — PROGRESS NOTE ADULT - SUBJECTIVE AND OBJECTIVE BOX
INFECTIOUS DISEASE FOLLOW UP NOTE:    Interval History/ROS: Patient is a 43y old  Male who presents with a chief complaint of Attempted suicide (24 Jan 2024 13:03)      Overnight events:    REVIEW OF SYSTEMS:        Prior hospital charts reviewed [Yes]  Primary team notes reviewed [Yes]  Other consultant notes reviewed [Yes]    Allergies:  No Known Allergies      ANTIMICROBIALS:       OTHER MEDS: MEDICATIONS  (STANDING):  acetaminophen     Tablet .. 650 every 6 hours PRN  aluminum hydroxide/magnesium hydroxide/simethicone Suspension 30 every 6 hours PRN  diphenhydrAMINE 50 every 6 hours PRN  haloperidol     Tablet 5 every 6 hours PRN  hydrOXYzine hydrochloride 50 every 6 hours PRN  melatonin 5 at bedtime PRN      Vital Signs Last 24 Hrs  T(F): 98.2 (01-25-24 @ 07:57), Max: 101.5 (01-22-24 @ 15:42)    Vital Signs Last 24 Hrs  HR: 81 (01-25-24 @ 07:57) (81 - 98)  BP: 108/55 (01-25-24 @ 07:57) (105/58 - 117/64)  RR: 18 (01-25-24 @ 07:57)  SpO2: --  Wt(kg): --    EXAM:      Labs:                        14.5   2.46  )-----------( 92       ( 25 Jan 2024 08:00 )             43.7     01-25    137  |  98  |  11  ----------------------------<  155<H>  4.2   |  30  |  1.1    Ca    9.3      25 Jan 2024 08:00    TPro  6.3  /  Alb  4.1  /  TBili  0.7  /  DBili  x   /  AST  113<H>  /  ALT  183<H>  /  AlkPhos  160<H>  01-25      WBC Trend:  WBC Count: 2.46 (01-25-24 @ 08:00)  WBC Count: 2.91 (01-24-24 @ 15:50)  WBC Count: 7.29 (01-22-24 @ 17:02)      Creatine Trend:  Creatinine: 1.1 (01-25)  Creatinine: 1.0 (01-24)  Creatinine: 1.2 (01-22)      Liver Biochemical Testing Trend:  Alanine Aminotransferase (ALT/SGPT): 183 *H* (01-25)  Alanine Aminotransferase (ALT/SGPT): 148 *H* (01-24)  Alanine Aminotransferase (ALT/SGPT): 34 (01-22)  Alanine Aminotransferase (ALT/SGPT): 18 (01-13)  Alanine Aminotransferase (ALT/SGPT): 20 (07-10)  Aspartate Aminotransferase (AST/SGOT): 113 (01-25-24 @ 08:00)  Aspartate Aminotransferase (AST/SGOT): 105 (01-24-24 @ 15:50)  Aspartate Aminotransferase (AST/SGOT): 24 (01-22-24 @ 17:02)  Aspartate Aminotransferase (AST/SGOT): 21 (01-13-24 @ 00:00)  Aspartate Aminotransferase (AST/SGOT): 14 (07-10-23 @ 07:10)  Bilirubin Total: 0.7 (01-25)  Bilirubin Total: 0.8 (01-24)  Bilirubin Total: 0.9 (01-22)  Bilirubin Direct: 0.3 (01-13)  Bilirubin Total: 1.7 (01-13)      Trend LDH      Urinalysis Basic - ( 25 Jan 2024 08:00 )    Color: x / Appearance: x / SG: x / pH: x  Gluc: 155 mg/dL / Ketone: x  / Bili: x / Urobili: x   Blood: x / Protein: x / Nitrite: x   Leuk Esterase: x / RBC: x / WBC x   Sq Epi: x / Non Sq Epi: x / Bacteria: x        MICROBIOLOGY:        Culture - Urine (collected 08 Apr 2023 03:20)  Source: Clean Catch Clean Catch (Midstream)  Final Report:    <10,000 CFU/mL Normal Urogenital Loretta      Rapid RVP Result: NotDetec (01-24 @ 18:10)    COVID-19 PCR: Detected (01-13-24 @ 09:53)    COVID-19 Flip Domain AB Interp: Positive (07-10-23 @ 07:10)      RADIOLOGY:  imaging below personally reviewed    < from: Xray Chest 2 Views PA/Lat (01.24.24 @ 15:30) >  FINDINGS:    SUPPORT DEVICES: None.    CARDIAC/MEDIASTINUM/HILUM: Unchanged cardiac silhouette.    LUNG PARENCHYMA/PLEURA: No focal consolidation or pleural effusion. No   pneumothorax.    SKELETON/SOFT TISSUES: Unchanged.      IMPRESSION:    No radiographic evidence of acute cardiopulmonary disease.    < end of copied text >   INFECTIOUS DISEASE FOLLOW UP NOTE:    Interval History/ROS: Patient is a 43y old  Male who presents with a chief complaint of Attempted suicide (24 Jan 2024 13:03)      Overnight events: No overnight event.     REVIEW OF SYSTEMS:  CONSTITUTIONAL: No fever or chills  HEAD: No lesion on scalp  EYES: No visual disturbance  ENT: No sore throat  RESPIRATORY: No cough, no shortness of breath  CARDIOVASCULAR: No chest pain or palpitations  GASTROINTESTINAL: No abdominal or epigastric pain  GENITOURINARY: No dysuria  NEUROLOGICAL: Mild headache this morning  MUSCULOSKELETAL: No joint pain, erythema, or swelling; no back pain  SKIN: No itching, rashes  All other ROS negative except noted above    Prior hospital charts reviewed [Yes]  Primary team notes reviewed [Yes]  Other consultant notes reviewed [Yes]    Allergies:  No Known Allergies      ANTIMICROBIALS:       OTHER MEDS: MEDICATIONS  (STANDING):  acetaminophen     Tablet .. 650 every 6 hours PRN  aluminum hydroxide/magnesium hydroxide/simethicone Suspension 30 every 6 hours PRN  diphenhydrAMINE 50 every 6 hours PRN  haloperidol     Tablet 5 every 6 hours PRN  hydrOXYzine hydrochloride 50 every 6 hours PRN  melatonin 5 at bedtime PRN      Vital Signs Last 24 Hrs  T(F): 98.2 (01-25-24 @ 07:57), Max: 101.5 (01-22-24 @ 15:42)    Vital Signs Last 24 Hrs  HR: 81 (01-25-24 @ 07:57) (81 - 98)  BP: 108/55 (01-25-24 @ 07:57) (105/58 - 117/64)  RR: 18 (01-25-24 @ 07:57)  SpO2: --  Wt(kg): --    EXAM:  GENERAL: NAD, lying in bed  HEAD: No head lesions  EYES: Conjunctiva pink and cornea white  EAR, NOSE, MOUTH, THROAT: Normal external ears and nose, no discharges; moist mucous membranes  NECK: Supple, nontender to palpation; no JVD  RESPIRATORY: Clear to auscultation bilaterally  CARDIOVASCULAR: S1 S2  GASTROINTESTINAL: Soft, nontender, nondistended; normoactive bowel sounds  EXTREMITIES: No clubbing, cyanosis, or petal edema  NERVOUS SYSTEM: Alert and oriented to person, time, place and situation, speech clear. No focal deficits   MUSCULOSKELETAL: No joint erythema, swelling or pain  SKIN: Dry skins on bilateral LEs, no superficial thrombophlebitis  PSYCH: Normal affect  Labs:                        14.5   2.46  )-----------( 92       ( 25 Jan 2024 08:00 )             43.7     01-25    137  |  98  |  11  ----------------------------<  155<H>  4.2   |  30  |  1.1    Ca    9.3      25 Jan 2024 08:00    TPro  6.3  /  Alb  4.1  /  TBili  0.7  /  DBili  x   /  AST  113<H>  /  ALT  183<H>  /  AlkPhos  160<H>  01-25      WBC Trend:  WBC Count: 2.46 (01-25-24 @ 08:00)  WBC Count: 2.91 (01-24-24 @ 15:50)  WBC Count: 7.29 (01-22-24 @ 17:02)      Creatine Trend:  Creatinine: 1.1 (01-25)  Creatinine: 1.0 (01-24)  Creatinine: 1.2 (01-22)      Liver Biochemical Testing Trend:  Alanine Aminotransferase (ALT/SGPT): 183 *H* (01-25)  Alanine Aminotransferase (ALT/SGPT): 148 *H* (01-24)  Alanine Aminotransferase (ALT/SGPT): 34 (01-22)  Alanine Aminotransferase (ALT/SGPT): 18 (01-13)  Alanine Aminotransferase (ALT/SGPT): 20 (07-10)  Aspartate Aminotransferase (AST/SGOT): 113 (01-25-24 @ 08:00)  Aspartate Aminotransferase (AST/SGOT): 105 (01-24-24 @ 15:50)  Aspartate Aminotransferase (AST/SGOT): 24 (01-22-24 @ 17:02)  Aspartate Aminotransferase (AST/SGOT): 21 (01-13-24 @ 00:00)  Aspartate Aminotransferase (AST/SGOT): 14 (07-10-23 @ 07:10)  Bilirubin Total: 0.7 (01-25)  Bilirubin Total: 0.8 (01-24)  Bilirubin Total: 0.9 (01-22)  Bilirubin Direct: 0.3 (01-13)  Bilirubin Total: 1.7 (01-13)      Trend LDH      Urinalysis Basic - ( 25 Jan 2024 08:00 )    Color: x / Appearance: x / SG: x / pH: x  Gluc: 155 mg/dL / Ketone: x  / Bili: x / Urobili: x   Blood: x / Protein: x / Nitrite: x   Leuk Esterase: x / RBC: x / WBC x   Sq Epi: x / Non Sq Epi: x / Bacteria: x        MICROBIOLOGY:        Culture - Urine (collected 08 Apr 2023 03:20)  Source: Clean Catch Clean Catch (Midstream)  Final Report:    <10,000 CFU/mL Normal Urogenital Loretta      Rapid RVP Result: NotDetec (01-24 @ 18:10)    COVID-19 PCR: Detected (01-13-24 @ 09:53)    COVID-19 Flip Domain AB Interp: Positive (07-10-23 @ 07:10)      RADIOLOGY:  imaging below personally reviewed    < from: Xray Chest 2 Views PA/Lat (01.24.24 @ 15:30) >  FINDINGS:    SUPPORT DEVICES: None.    CARDIAC/MEDIASTINUM/HILUM: Unchanged cardiac silhouette.    LUNG PARENCHYMA/PLEURA: No focal consolidation or pleural effusion. No   pneumothorax.    SKELETON/SOFT TISSUES: Unchanged.      IMPRESSION:    No radiographic evidence of acute cardiopulmonary disease.    < end of copied text >

## 2024-01-25 NOTE — BH INPATIENT PSYCHIATRY PROGRESS NOTE - CURRENT MEDICATION
MEDICATIONS  (STANDING):    MEDICATIONS  (PRN):  acetaminophen     Tablet .. 650 milliGRAM(s) Oral every 6 hours PRN Temp greater or equal to 38C (100.4F), Mild Pain (1 - 3), Moderate Pain (4 - 6)  aluminum hydroxide/magnesium hydroxide/simethicone Suspension 30 milliLiter(s) Oral every 6 hours PRN Dyspepsia  diphenhydrAMINE 50 milliGRAM(s) Oral every 6 hours PRN Extrapyramidal prophylaxis  haloperidol     Tablet 5 milliGRAM(s) Oral every 6 hours PRN Agitation  hydrOXYzine hydrochloride 50 milliGRAM(s) Oral every 6 hours PRN anxiety/insomnia  melatonin 5 milliGRAM(s) Oral at bedtime PRN Insomnia  nicotine  Polacrilex Gum 2 milliGRAM(s) Oral every 2 hours PRN smoking cessation

## 2024-01-25 NOTE — BH INPATIENT PSYCHIATRY PROGRESS NOTE - NSBHASSESSSUMMFT_PSY_ALL_CORE
43-year-old man,, single with no significant PMH and with PPH of schizoaffective disorder, substance use disorder (alcohol, cannabis, cocaine), substance induced psychosis, PTSD, multiple prior hospitalizations (most recently to [facility] in 11/2023), reported history of suicide attempts, prior legal history of drug-related charges, who was BIB self after trying to hang himself with a belt from command auditory hallucinations. Pt presented with depressed mood, anhedonia, and aborted suicide attempt via hanging self with a belt, in the context of financial stressors and feelings of hopelessness. The patient has history of multiple prior hospitalizations and ED visits, is a poor utilizer of outpatient services, and has history of substance abuse. Pt will likely benefit from IPP at this time.    On evaluation, pt presents alert, linear and AOx3, with improved focus and ADLs. Of note, pt continues with intermittent fevers, negative NMS w/u, pending ID consult. Pt currently afebrile and denying any physical complaints, no rigidity on exam. Pt reports continued AH today telling him to harm self. He denies intent and plan to harm self. He is future-oriented, requesting to speak with SW regarding housing and social security benefits.     #Schizoaffective d/o  -c/w haldol 10 mg bid  -c/w cogentin 0.5 mg bid for eps ppx  -c/w remeron 15 mg qhs (1/23)    #H/o polysubstance use  -pt denies recent use  -UDS unable to be collected    #Agitation  -for agitation not amenable to verbal redirection, may give haldol 5 mg q6h prn and ativan 2 mg q6h prn with escalation to IM if pt is refusing PO and is an acute danger to self or/and others with repeat EKG to ensure QTc <500 ms     43-year-old man,, single with no significant PMH and with PPH of schizoaffective disorder, substance use disorder (alcohol, cannabis, cocaine), substance induced psychosis, PTSD, multiple prior hospitalizations (most recently to [facility] in 11/2023), reported history of suicide attempts, prior legal history of drug-related charges, who was BIB self after trying to hang himself with a belt from command auditory hallucinations. Pt presented with depressed mood, anhedonia, and aborted suicide attempt via hanging self with a belt, in the context of financial stressors and feelings of hopelessness. The patient has history of multiple prior hospitalizations and ED visits, is a poor utilizer of outpatient services, and has history of substance abuse. Pt will likely benefit from IPP at this time.    On evaluation, pt presents alert, linear and AOx3, with improved focus and ADLs. Of note, pt continues with intermittent fevers, negative NMS w/u, noted with lab abnormalities (leukopenia/neutropenia, thrombocytopenia, elevated LFTs). Medications held, pending further consults/work up. Pt currently afebrile and denying any physical complaints, no rigidity on exam. Pt reports continued AH today telling him to harm self. He denies intent and plan to harm self. He is future-oriented, requesting to speak with SW regarding housing and social security benefits.     #Schizoaffective d/o  -hold haldol 10 mg bid d/t lab abnormalities  -hold cogentin 0.5 mg bid for eps ppx  -hold remeron 15 mg qhs (1/23)    #Leukopenia/neutropenia, thrombocytopenia  -WBC 7.29 (1/22) --> 2.91 (1/24) --> 2.46 (1/25)  -Plts 92   -psychotropics held  -ID consult  -medicine consult    #H/o polysubstance use  -pt denies recent use  -UDS unable to be collected    #Agitation  -for agitation not amenable to verbal redirection, may give haldol 5 mg q6h prn and ativan 2 mg q6h prn with escalation to IM if pt is refusing PO and is an acute danger to self or/and others with repeat EKG to ensure QTc <500 ms

## 2024-01-25 NOTE — BH INPATIENT PSYCHIATRY PROGRESS NOTE - NSBHMETABOLIC_PSY_ALL_CORE_FT
BMI: BMI (kg/m2): 24.6 (01-14-24 @ 12:21)  HbA1c: A1C with Estimated Average Glucose Result: 5.1 % (01-15-24 @ 08:34)    Glucose: POCT Blood Glucose.: 128 mg/dL (01-13-24 @ 04:49)    BP: 108/55 (01-25-24 @ 07:57) (94/65 - 117/64)Vital Signs Last 24 Hrs  T(C): 36.8 (01-25-24 @ 07:57), Max: 37.9 (01-24-24 @ 21:22)  T(F): 98.2 (01-25-24 @ 07:57), Max: 100.3 (01-24-24 @ 21:22)  HR: 81 (01-25-24 @ 07:57) (81 - 98)  BP: 108/55 (01-25-24 @ 07:57) (105/58 - 117/64)  BP(mean): --  RR: 18 (01-25-24 @ 07:57) (16 - 18)  SpO2: --      Lipid Panel: Date/Time: 01-15-24 @ 08:34  Cholesterol, Serum: 110  LDL Cholesterol Calculated: 39  HDL Cholesterol, Serum: 40  Total Cholesterol/HDL Ration Measurement: --  Triglycerides, Serum: 154

## 2024-01-26 LAB
ALBUMIN SERPL ELPH-MCNC: 4.3 G/DL — SIGNIFICANT CHANGE UP (ref 3.5–5.2)
ALP SERPL-CCNC: 161 U/L — HIGH (ref 30–115)
ALT FLD-CCNC: 158 U/L — HIGH (ref 0–41)
ANION GAP SERPL CALC-SCNC: 12 MMOL/L — SIGNIFICANT CHANGE UP (ref 7–14)
AST SERPL-CCNC: 75 U/L — HIGH (ref 0–41)
BASOPHILS # BLD AUTO: 0.04 K/UL — SIGNIFICANT CHANGE UP (ref 0–0.2)
BASOPHILS NFR BLD AUTO: 1 % — SIGNIFICANT CHANGE UP (ref 0–1)
BILIRUB SERPL-MCNC: 0.8 MG/DL — SIGNIFICANT CHANGE UP (ref 0.2–1.2)
BUN SERPL-MCNC: 12 MG/DL — SIGNIFICANT CHANGE UP (ref 10–20)
CALCIUM SERPL-MCNC: 9.8 MG/DL — SIGNIFICANT CHANGE UP (ref 8.4–10.5)
CHLORIDE SERPL-SCNC: 98 MMOL/L — SIGNIFICANT CHANGE UP (ref 98–110)
CO2 SERPL-SCNC: 28 MMOL/L — SIGNIFICANT CHANGE UP (ref 17–32)
CREAT SERPL-MCNC: 1 MG/DL — SIGNIFICANT CHANGE UP (ref 0.7–1.5)
EGFR: 97 ML/MIN/1.73M2 — SIGNIFICANT CHANGE UP
EOSINOPHIL # BLD AUTO: 0.22 K/UL — SIGNIFICANT CHANGE UP (ref 0–0.7)
EOSINOPHIL NFR BLD AUTO: 5.4 % — SIGNIFICANT CHANGE UP (ref 0–8)
GLUCOSE SERPL-MCNC: 146 MG/DL — HIGH (ref 70–99)
HCT VFR BLD CALC: 41.7 % — LOW (ref 42–52)
HGB BLD-MCNC: 14.3 G/DL — SIGNIFICANT CHANGE UP (ref 14–18)
IMM GRANULOCYTES NFR BLD AUTO: 3.4 % — HIGH (ref 0.1–0.3)
LYMPHOCYTES # BLD AUTO: 0.47 K/UL — LOW (ref 1.2–3.4)
LYMPHOCYTES # BLD AUTO: 11.5 % — LOW (ref 20.5–51.1)
MCHC RBC-ENTMCNC: 26.7 PG — LOW (ref 27–31)
MCHC RBC-ENTMCNC: 34.3 G/DL — SIGNIFICANT CHANGE UP (ref 32–37)
MCV RBC AUTO: 77.9 FL — LOW (ref 80–94)
MONOCYTES # BLD AUTO: 0.69 K/UL — HIGH (ref 0.1–0.6)
MONOCYTES NFR BLD AUTO: 16.9 % — HIGH (ref 1.7–9.3)
NEUTROPHILS # BLD AUTO: 2.52 K/UL — SIGNIFICANT CHANGE UP (ref 1.4–6.5)
NEUTROPHILS NFR BLD AUTO: 61.8 % — SIGNIFICANT CHANGE UP (ref 42.2–75.2)
NRBC # BLD: 0 /100 WBCS — SIGNIFICANT CHANGE UP (ref 0–0)
PARASITE BLOOD SMEAR RESULT: SIGNIFICANT CHANGE UP
PLATELET # BLD AUTO: 110 K/UL — LOW (ref 130–400)
PMV BLD: 10.7 FL — HIGH (ref 7.4–10.4)
POTASSIUM SERPL-MCNC: 4.2 MMOL/L — SIGNIFICANT CHANGE UP (ref 3.5–5)
POTASSIUM SERPL-SCNC: 4.2 MMOL/L — SIGNIFICANT CHANGE UP (ref 3.5–5)
PROT SERPL-MCNC: 6.4 G/DL — SIGNIFICANT CHANGE UP (ref 6–8)
RBC # BLD: 5.35 M/UL — SIGNIFICANT CHANGE UP (ref 4.7–6.1)
RBC # FLD: 13.2 % — SIGNIFICANT CHANGE UP (ref 11.5–14.5)
SODIUM SERPL-SCNC: 138 MMOL/L — SIGNIFICANT CHANGE UP (ref 135–146)
WBC # BLD: 4.08 K/UL — LOW (ref 4.8–10.8)
WBC # FLD AUTO: 4.08 K/UL — LOW (ref 4.8–10.8)

## 2024-01-26 PROCEDURE — 99231 SBSQ HOSP IP/OBS SF/LOW 25: CPT

## 2024-01-26 RX ORDER — OLANZAPINE 15 MG/1
5 TABLET, FILM COATED ORAL
Refills: 0 | Status: DISCONTINUED | OUTPATIENT
Start: 2024-01-27 | End: 2024-01-29

## 2024-01-26 RX ORDER — OLANZAPINE 15 MG/1
5 TABLET, FILM COATED ORAL ONCE
Refills: 0 | Status: COMPLETED | OUTPATIENT
Start: 2024-01-26 | End: 2024-01-26

## 2024-01-26 RX ADMIN — OLANZAPINE 5 MILLIGRAM(S): 15 TABLET, FILM COATED ORAL at 11:05

## 2024-01-26 NOTE — BH INPATIENT PSYCHIATRY PROGRESS NOTE - NSBHCHARTREVIEWVS_PSY_A_CORE FT
Vital Signs Last 24 Hrs  T(C): 36.3 (01-26-24 @ 07:55), Max: 36.6 (01-25-24 @ 15:54)  T(F): 97.4 (01-26-24 @ 07:55), Max: 97.9 (01-25-24 @ 15:54)  HR: 80 (01-26-24 @ 07:55) (73 - 80)  BP: 110/71 (01-26-24 @ 07:55) (107/62 - 110/71)  BP(mean): --  RR: 18 (01-26-24 @ 07:55) (16 - 18)  SpO2: --

## 2024-01-26 NOTE — BH INPATIENT PSYCHIATRY PROGRESS NOTE - NSBHMETABOLIC_PSY_ALL_CORE_FT
BMI: BMI (kg/m2): 24.6 (01-14-24 @ 12:21)  HbA1c: A1C with Estimated Average Glucose Result: 5.1 % (01-15-24 @ 08:34)    Glucose: POCT Blood Glucose.: 128 mg/dL (01-13-24 @ 04:49)    BP: 110/71 (01-26-24 @ 07:55) (102/55 - 117/64)Vital Signs Last 24 Hrs  T(C): 36.3 (01-26-24 @ 07:55), Max: 36.6 (01-25-24 @ 15:54)  T(F): 97.4 (01-26-24 @ 07:55), Max: 97.9 (01-25-24 @ 15:54)  HR: 80 (01-26-24 @ 07:55) (73 - 80)  BP: 110/71 (01-26-24 @ 07:55) (107/62 - 110/71)  BP(mean): --  RR: 18 (01-26-24 @ 07:55) (16 - 18)  SpO2: --      Lipid Panel: Date/Time: 01-15-24 @ 08:34  Cholesterol, Serum: 110  LDL Cholesterol Calculated: 39  HDL Cholesterol, Serum: 40  Total Cholesterol/HDL Ration Measurement: --  Triglycerides, Serum: 154

## 2024-01-26 NOTE — BH INPATIENT PSYCHIATRY PROGRESS NOTE - NSBHASSESSSUMMFT_PSY_ALL_CORE
43-year-old man,, single with no significant PMH and with PPH of schizoaffective disorder, substance use disorder (alcohol, cannabis, cocaine), substance induced psychosis, PTSD, multiple prior hospitalizations (most recently to [facility] in 11/2023), reported history of suicide attempts, prior legal history of drug-related charges, who was BIB self after trying to hang himself with a belt from command auditory hallucinations. Pt presented with depressed mood, anhedonia, and aborted suicide attempt via hanging self with a belt, in the context of financial stressors and feelings of hopelessness. The patient has history of multiple prior hospitalizations and ED visits, is a poor utilizer of outpatient services, and has history of substance abuse. Pt will likely benefit from IPP at this time.    On evaluation, pt presents alert, linear and AOx3, with improved focus and ADLs. Of note, pt continues with intermittent fevers, negative NMS w/u, noted with lab abnormalities (leukopenia/neutropenia, thrombocytopenia, elevated LFTs). Medications held, pending further consults/work up. Pt currently afebrile and denying any physical complaints, no rigidity on exam. Pt reports continued AH today telling him to harm self. He denies intent and plan to harm self. He is future-oriented, requesting to speak with SW regarding housing and social security benefits.     #Schizoaffective d/o  -start zyprexa 5 mg daily   -d/c haldol 10 mg bid d/t lab abnormalities  -hold cogentin 0.5 mg bid for eps ppx  -hold remeron 15 mg qhs (1/23)    #Leukopenia/neutropenia, thrombocytopenia  -WBC 7.29 (1/22) --> 2.91 (1/24) --> 2.46 (1/25)  -Plts 92   -psychotropics held  -ID consult  -medicine consult    #H/o polysubstance use  -pt denies recent use  -UDS unable to be collected    #Agitation  -for agitation not amenable to verbal redirection, may give haldol 5 mg q6h prn and ativan 2 mg q6h prn with escalation to IM if pt is refusing PO and is an acute danger to self or/and others with repeat EKG to ensure QTc <500 ms     43-year-old man,, single with no significant PMH and with PPH of schizoaffective disorder, substance use disorder (alcohol, cannabis, cocaine), substance induced psychosis, PTSD, multiple prior hospitalizations (most recently to [facility] in 11/2023), reported history of suicide attempts, prior legal history of drug-related charges, who was BIB self after trying to hang himself with a belt from command auditory hallucinations. Pt presented with depressed mood, anhedonia, and aborted suicide attempt via hanging self with a belt, in the context of financial stressors and feelings of hopelessness. The patient has history of multiple prior hospitalizations and ED visits, is a poor utilizer of outpatient services, and has history of substance abuse. Pt will likely benefit from IPP at this time.    On evaluation, pt presents alert, linear and AOx3, with improved focus and ADLs. Of note, pt continues with intermittent fevers, negative NMS w/u, noted with lab abnormalities (leukopenia/neutropenia, thrombocytopenia, elevated LFTs). Medications held, pending further consults/work up. Pt currently afebrile and denying any physical complaints, no rigidity on exam. Pt reports continued AH today telling him to harm self. He denies intent and plan to harm self. He is future-oriented, requesting to speak with SW regarding housing and social security benefits.     #Schizoaffective d/o  -start zyprexa 5 mg daily (1/26), titrate zyprexa 5 mg bid (1/27)  -d/c haldol 10 mg bid d/t lab abnormalities  -hold cogentin 0.5 mg bid for eps ppx  -hold remeron 15 mg qhs    #Leukopenia/neutropenia, thrombocytopenia  -improving  -trend CBC with diff with initiation of zyprexa  -WBC 7.29 (1/22) --> 2.46 (1/25) --> 4.08 (1/26)  -ANC 1328 (1/25) --> 2521 (1/26)  -Plts 92 (1/25) --> 110 (1/26)  -ID consult  -medicine consult pending    #H/o polysubstance use  -pt denies recent use  -UDS unable to be collected    #Agitation  -for agitation not amenable to verbal redirection, may give haldol 5 mg q6h prn and ativan 2 mg q6h prn with escalation to IM if pt is refusing PO and is an acute danger to self or/and others with repeat EKG to ensure QTc <500 ms

## 2024-01-26 NOTE — BH INPATIENT PSYCHIATRY PROGRESS NOTE - NSBHFUPINTERVALHXFT_PSY_A_CORE
Pt seen and evaluated, chart reviewed; labs noted, ID following. As per nursing report, pt remained afebrile, no other acute events. On evaluation, pt presents alert, linear and AOx3, anxious and cooperative. He reports he is physically feeling well, denies flu-like sx. However he reports AH worsening with stop of haldol, states telling him to "kill myself". He denies intent or plan to harm self, is future-oriented and endorses hope of AH returning to baseline. Pt reports reasons to live include his family. Pt able to safety plan. He reports intemittent VH of "shadows" overnight, denies current VH. He denies paranoia, states he feels safe. He denies SI/HI, intent and plan. In behavioral control. Psychoeducation provided on indication, RAB and side effects profile of zyprexa; pt reports good response in past.  Pt seen and evaluated, chart reviewed; labs noted, ID following. As per nursing report, pt remained afebrile, no other acute events. On evaluation, pt presents alert, linear and AOx3, anxious and cooperative. He reports he is physically feeling well, denies flu-like sx. However he reports AH worsening with stop of haldol, states telling him to "kill myself". He denies intent or plan to harm self, is future-oriented and endorses hope of AH returning to baseline. Pt reports reasons to live include his family. Pt able to safety plan. He reports intermittent VH of "shadows" overnight, denies current VH. He denies paranoia, states he feels safe. He denies SI/HI, intent and plan. In behavioral control. Psychoeducation provided on indication, RAB and side effects profile of zyprexa, with emphasis on need to monitor CBC for neutropenia/agranulocytosis; pt reports good response in past and agrees to treatment plan.  Case discussed with Dr. Leyva.

## 2024-01-26 NOTE — BH INPATIENT PSYCHIATRY PROGRESS NOTE - PRN MEDS
MEDICATIONS  (PRN):  acetaminophen     Tablet .. 650 milliGRAM(s) Oral every 6 hours PRN Temp greater or equal to 38C (100.4F), Mild Pain (1 - 3), Moderate Pain (4 - 6)  aluminum hydroxide/magnesium hydroxide/simethicone Suspension 30 milliLiter(s) Oral every 6 hours PRN Dyspepsia  diphenhydrAMINE 50 milliGRAM(s) Oral every 6 hours PRN Extrapyramidal prophylaxis  hydrOXYzine hydrochloride 50 milliGRAM(s) Oral every 6 hours PRN anxiety/insomnia  LORazepam     Tablet 2 milliGRAM(s) Oral every 6 hours PRN agitation  melatonin 5 milliGRAM(s) Oral at bedtime PRN Insomnia  nicotine  Polacrilex Gum 2 milliGRAM(s) Oral every 2 hours PRN smoking cessation

## 2024-01-26 NOTE — BH INPATIENT PSYCHIATRY PROGRESS NOTE - CURRENT MEDICATION
MEDICATIONS  (STANDING):    MEDICATIONS  (PRN):  acetaminophen     Tablet .. 650 milliGRAM(s) Oral every 6 hours PRN Temp greater or equal to 38C (100.4F), Mild Pain (1 - 3), Moderate Pain (4 - 6)  aluminum hydroxide/magnesium hydroxide/simethicone Suspension 30 milliLiter(s) Oral every 6 hours PRN Dyspepsia  diphenhydrAMINE 50 milliGRAM(s) Oral every 6 hours PRN Extrapyramidal prophylaxis  hydrOXYzine hydrochloride 50 milliGRAM(s) Oral every 6 hours PRN anxiety/insomnia  LORazepam     Tablet 2 milliGRAM(s) Oral every 6 hours PRN agitation  melatonin 5 milliGRAM(s) Oral at bedtime PRN Insomnia  nicotine  Polacrilex Gum 2 milliGRAM(s) Oral every 2 hours PRN smoking cessation

## 2024-01-27 LAB
B BURGDOR IGG+IGM SER QL IB: SIGNIFICANT CHANGE UP
BASOPHILS # BLD AUTO: 0.07 K/UL — SIGNIFICANT CHANGE UP (ref 0–0.2)
BASOPHILS NFR BLD AUTO: 1.3 % — HIGH (ref 0–1)
CMV IGG FLD QL: <0.2 U/ML — SIGNIFICANT CHANGE UP
CMV IGG SERPL-IMP: NEGATIVE — SIGNIFICANT CHANGE UP
CMV IGM FLD-ACNC: <8 AU/ML — SIGNIFICANT CHANGE UP
CMV IGM SERPL QL: NEGATIVE — SIGNIFICANT CHANGE UP
EBV EA AB SER IA-ACNC: <5 U/ML — SIGNIFICANT CHANGE UP
EBV EA AB TITR SER IF: POSITIVE
EBV EA IGG SER-ACNC: NEGATIVE — SIGNIFICANT CHANGE UP
EBV NA IGG SER IA-ACNC: >600 U/ML — HIGH
EBV PATRN SPEC IB-IMP: SIGNIFICANT CHANGE UP
EBV VCA IGG AVIDITY SER QL IA: POSITIVE
EBV VCA IGM SER IA-ACNC: 94.8 U/ML — HIGH
EBV VCA IGM SER IA-ACNC: <10 U/ML — SIGNIFICANT CHANGE UP
EBV VCA IGM TITR FLD: NEGATIVE — SIGNIFICANT CHANGE UP
EOSINOPHIL # BLD AUTO: 0.39 K/UL — SIGNIFICANT CHANGE UP (ref 0–0.7)
EOSINOPHIL NFR BLD AUTO: 7.4 % — SIGNIFICANT CHANGE UP (ref 0–8)
HAV IGM SER-ACNC: SIGNIFICANT CHANGE UP
HBV CORE AB SER-ACNC: SIGNIFICANT CHANGE UP
HBV CORE IGM SER-ACNC: SIGNIFICANT CHANGE UP
HBV SURFACE AB SER-ACNC: SIGNIFICANT CHANGE UP
HBV SURFACE AG SER-ACNC: SIGNIFICANT CHANGE UP
HCT VFR BLD CALC: 42.2 % — SIGNIFICANT CHANGE UP (ref 42–52)
HCV AB S/CO SERPL IA: 0.06 S/CO — SIGNIFICANT CHANGE UP (ref 0–0.99)
HCV AB SERPL-IMP: SIGNIFICANT CHANGE UP
HGB BLD-MCNC: 14.2 G/DL — SIGNIFICANT CHANGE UP (ref 14–18)
HIV 1+2 AB+HIV1 P24 AG SERPL QL IA: SIGNIFICANT CHANGE UP
IMM GRANULOCYTES NFR BLD AUTO: 5.7 % — HIGH (ref 0.1–0.3)
LYMPHOCYTES # BLD AUTO: 0.61 K/UL — LOW (ref 1.2–3.4)
LYMPHOCYTES # BLD AUTO: 11.5 % — LOW (ref 20.5–51.1)
MCHC RBC-ENTMCNC: 26.4 PG — LOW (ref 27–31)
MCHC RBC-ENTMCNC: 33.6 G/DL — SIGNIFICANT CHANGE UP (ref 32–37)
MCV RBC AUTO: 78.6 FL — LOW (ref 80–94)
MONOCYTES # BLD AUTO: 0.84 K/UL — HIGH (ref 0.1–0.6)
MONOCYTES NFR BLD AUTO: 15.8 % — HIGH (ref 1.7–9.3)
NEUTROPHILS # BLD AUTO: 3.09 K/UL — SIGNIFICANT CHANGE UP (ref 1.4–6.5)
NEUTROPHILS NFR BLD AUTO: 58.3 % — SIGNIFICANT CHANGE UP (ref 42.2–75.2)
NRBC # BLD: 0 /100 WBCS — SIGNIFICANT CHANGE UP (ref 0–0)
PLATELET # BLD AUTO: 129 K/UL — LOW (ref 130–400)
PMV BLD: 10.5 FL — HIGH (ref 7.4–10.4)
RBC # BLD: 5.37 M/UL — SIGNIFICANT CHANGE UP (ref 4.7–6.1)
RBC # FLD: 13.4 % — SIGNIFICANT CHANGE UP (ref 11.5–14.5)
WBC # BLD: 5.3 K/UL — SIGNIFICANT CHANGE UP (ref 4.8–10.8)
WBC # FLD AUTO: 5.3 K/UL — SIGNIFICANT CHANGE UP (ref 4.8–10.8)

## 2024-01-27 RX ADMIN — OLANZAPINE 5 MILLIGRAM(S): 15 TABLET, FILM COATED ORAL at 20:08

## 2024-01-27 RX ADMIN — OLANZAPINE 5 MILLIGRAM(S): 15 TABLET, FILM COATED ORAL at 08:20

## 2024-01-27 NOTE — BH CHART NOTE - NSEVENTNOTEFT_PSY_ALL_CORE
Handoff given to weekend team to follow up on CBC specifically WBC count given history of leukopenia.    WBC count from this morning resulted. WBC was within normal limits.    Complete Blood Count + Automated Diff in AM (01.27.24 @ 09:17)   WBC Count: 5.30 K/uL  RBC Count: 5.37 M/uL  Hemoglobin: 14.2 g/dL  Hematocrit: 42.2 %  Mean Cell Volume: 78.6 fL  Mean Cell Hemoglobin: 26.4 pg  Mean Cell Hemoglobin Conc: 33.6 g/dL  Red Cell Distrib Width: 13.4 %  Platelet Count - Automated: 129 K/uL  MPV: 10.5 fL  Auto Neutrophil #: 3.09 K/uL  Auto Lymphocyte #: 0.61 K/uL  Auto Monocyte #: 0.84 K/uL  Auto Eosinophil #: 0.39 K/uL  Auto Basophil #: 0.07 K/uL  Auto Neutrophil %: 58.3: Differential percentages must be correlated with absolute numbers for   clinical significance. %  Auto Lymphocyte %: 11.5 %  Auto Monocyte %: 15.8 %  Auto Eosinophil %: 7.4 %  Auto Basophil %: 1.3 %  Auto Immature Granulocyte %: 5.7: (Includes meta, myelo and promyelocytes). Mild elevations in immature   granulocytes may be seen with many inflammatory processes and pregnancy;   clinical correlation suggested. %  Nucleated RBC: 0 /100 WBCs

## 2024-01-28 RX ADMIN — OLANZAPINE 5 MILLIGRAM(S): 15 TABLET, FILM COATED ORAL at 20:06

## 2024-01-28 RX ADMIN — OLANZAPINE 5 MILLIGRAM(S): 15 TABLET, FILM COATED ORAL at 08:19

## 2024-01-29 LAB
A PHAGOCYTOPH DNA BLD QL NAA+PROBE: NEGATIVE — SIGNIFICANT CHANGE UP
ALBUMIN SERPL ELPH-MCNC: 4.2 G/DL — SIGNIFICANT CHANGE UP (ref 3.5–5.2)
ALP SERPL-CCNC: 142 U/L — HIGH (ref 30–115)
ALT FLD-CCNC: 91 U/L — HIGH (ref 0–41)
ANION GAP SERPL CALC-SCNC: 8 MMOL/L — SIGNIFICANT CHANGE UP (ref 7–14)
AST SERPL-CCNC: 36 U/L — SIGNIFICANT CHANGE UP (ref 0–41)
B MICROTI DNA BLD QL NAA+PROBE: NEGATIVE — SIGNIFICANT CHANGE UP
B MIYAMOTOI GLPQ BLD QL NAA+NON-PROBE: NEGATIVE — SIGNIFICANT CHANGE UP
BABESIA DNA SPEC QL NAA+PROBE: NEGATIVE — SIGNIFICANT CHANGE UP
BABESIA DNA SPEC QL NAA+PROBE: NEGATIVE — SIGNIFICANT CHANGE UP
BASOPHILS # BLD AUTO: 0.03 K/UL — SIGNIFICANT CHANGE UP (ref 0–0.2)
BASOPHILS # BLD AUTO: 0.08 K/UL — SIGNIFICANT CHANGE UP (ref 0–0.2)
BASOPHILS NFR BLD AUTO: 0.4 % — SIGNIFICANT CHANGE UP (ref 0–1)
BASOPHILS NFR BLD AUTO: 1 % — SIGNIFICANT CHANGE UP (ref 0–1)
BILIRUB SERPL-MCNC: 0.6 MG/DL — SIGNIFICANT CHANGE UP (ref 0.2–1.2)
BUN SERPL-MCNC: 13 MG/DL — SIGNIFICANT CHANGE UP (ref 10–20)
CALCIUM SERPL-MCNC: 9.7 MG/DL — SIGNIFICANT CHANGE UP (ref 8.4–10.5)
CHLORIDE SERPL-SCNC: 98 MMOL/L — SIGNIFICANT CHANGE UP (ref 98–110)
CMV DNA CSF QL NAA+PROBE: SIGNIFICANT CHANGE UP IU/ML
CMV DNA SPEC NAA+PROBE-LOG#: SIGNIFICANT CHANGE UP LOG10IU/ML
CO2 SERPL-SCNC: 32 MMOL/L — SIGNIFICANT CHANGE UP (ref 17–32)
CREAT SERPL-MCNC: 1 MG/DL — SIGNIFICANT CHANGE UP (ref 0.7–1.5)
CULTURE RESULTS: SIGNIFICANT CHANGE UP
CULTURE RESULTS: SIGNIFICANT CHANGE UP
E CHAFFEENSIS DNA BLD QL NAA+PROBE: NEGATIVE — SIGNIFICANT CHANGE UP
E EWINGII DNA SPEC QL NAA+PROBE: NEGATIVE — SIGNIFICANT CHANGE UP
EGFR: 97 ML/MIN/1.73M2 — SIGNIFICANT CHANGE UP
EHRLICHIA DNA SPEC QL NAA+PROBE: NEGATIVE — SIGNIFICANT CHANGE UP
EOSINOPHIL # BLD AUTO: 0.33 K/UL — SIGNIFICANT CHANGE UP (ref 0–0.7)
EOSINOPHIL # BLD AUTO: 0.35 K/UL — SIGNIFICANT CHANGE UP (ref 0–0.7)
EOSINOPHIL NFR BLD AUTO: 4 % — SIGNIFICANT CHANGE UP (ref 0–8)
EOSINOPHIL NFR BLD AUTO: 4.2 % — SIGNIFICANT CHANGE UP (ref 0–8)
GLUCOSE SERPL-MCNC: 83 MG/DL — SIGNIFICANT CHANGE UP (ref 70–99)
HCT VFR BLD CALC: 39 % — LOW (ref 42–52)
HCT VFR BLD CALC: 41.1 % — LOW (ref 42–52)
HGB BLD-MCNC: 13.1 G/DL — LOW (ref 14–18)
HGB BLD-MCNC: 13.7 G/DL — LOW (ref 14–18)
IMM GRANULOCYTES NFR BLD AUTO: 8.8 % — HIGH (ref 0.1–0.3)
IMM GRANULOCYTES NFR BLD AUTO: 8.9 % — HIGH (ref 0.1–0.3)
LYMPHOCYTES # BLD AUTO: 0.78 K/UL — LOW (ref 1.2–3.4)
LYMPHOCYTES # BLD AUTO: 0.82 K/UL — LOW (ref 1.2–3.4)
LYMPHOCYTES # BLD AUTO: 10 % — LOW (ref 20.5–51.1)
LYMPHOCYTES # BLD AUTO: 9.4 % — LOW (ref 20.5–51.1)
MCHC RBC-ENTMCNC: 26.5 PG — LOW (ref 27–31)
MCHC RBC-ENTMCNC: 26.9 PG — LOW (ref 27–31)
MCHC RBC-ENTMCNC: 33.3 G/DL — SIGNIFICANT CHANGE UP (ref 32–37)
MCHC RBC-ENTMCNC: 33.6 G/DL — SIGNIFICANT CHANGE UP (ref 32–37)
MCV RBC AUTO: 78.8 FL — LOW (ref 80–94)
MCV RBC AUTO: 80.7 FL — SIGNIFICANT CHANGE UP (ref 80–94)
MONOCYTES # BLD AUTO: 0.89 K/UL — HIGH (ref 0.1–0.6)
MONOCYTES # BLD AUTO: 0.92 K/UL — HIGH (ref 0.1–0.6)
MONOCYTES NFR BLD AUTO: 10.9 % — HIGH (ref 1.7–9.3)
MONOCYTES NFR BLD AUTO: 11.1 % — HIGH (ref 1.7–9.3)
NEUTROPHILS # BLD AUTO: 5.38 K/UL — SIGNIFICANT CHANGE UP (ref 1.4–6.5)
NEUTROPHILS # BLD AUTO: 5.4 K/UL — SIGNIFICANT CHANGE UP (ref 1.4–6.5)
NEUTROPHILS NFR BLD AUTO: 65.4 % — SIGNIFICANT CHANGE UP (ref 42.2–75.2)
NEUTROPHILS NFR BLD AUTO: 65.9 % — SIGNIFICANT CHANGE UP (ref 42.2–75.2)
NRBC # BLD: 0 /100 WBCS — SIGNIFICANT CHANGE UP (ref 0–0)
NRBC # BLD: 0 /100 WBCS — SIGNIFICANT CHANGE UP (ref 0–0)
PLATELET # BLD AUTO: 181 K/UL — SIGNIFICANT CHANGE UP (ref 130–400)
PLATELET # BLD AUTO: 192 K/UL — SIGNIFICANT CHANGE UP (ref 130–400)
PMV BLD: 9.6 FL — SIGNIFICANT CHANGE UP (ref 7.4–10.4)
PMV BLD: 9.7 FL — SIGNIFICANT CHANGE UP (ref 7.4–10.4)
POTASSIUM SERPL-MCNC: 4.1 MMOL/L — SIGNIFICANT CHANGE UP (ref 3.5–5)
POTASSIUM SERPL-SCNC: 4.1 MMOL/L — SIGNIFICANT CHANGE UP (ref 3.5–5)
PROT SERPL-MCNC: 6.3 G/DL — SIGNIFICANT CHANGE UP (ref 6–8)
RBC # BLD: 4.95 M/UL — SIGNIFICANT CHANGE UP (ref 4.7–6.1)
RBC # BLD: 5.09 M/UL — SIGNIFICANT CHANGE UP (ref 4.7–6.1)
RBC # FLD: 13.6 % — SIGNIFICANT CHANGE UP (ref 11.5–14.5)
RBC # FLD: 13.6 % — SIGNIFICANT CHANGE UP (ref 11.5–14.5)
SODIUM SERPL-SCNC: 138 MMOL/L — SIGNIFICANT CHANGE UP (ref 135–146)
SPECIMEN SOURCE: SIGNIFICANT CHANGE UP
SPECIMEN SOURCE: SIGNIFICANT CHANGE UP
WBC # BLD: 8.17 K/UL — SIGNIFICANT CHANGE UP (ref 4.8–10.8)
WBC # BLD: 8.27 K/UL — SIGNIFICANT CHANGE UP (ref 4.8–10.8)
WBC # FLD AUTO: 8.17 K/UL — SIGNIFICANT CHANGE UP (ref 4.8–10.8)
WBC # FLD AUTO: 8.27 K/UL — SIGNIFICANT CHANGE UP (ref 4.8–10.8)

## 2024-01-29 PROCEDURE — 99231 SBSQ HOSP IP/OBS SF/LOW 25: CPT

## 2024-01-29 RX ORDER — OLANZAPINE 15 MG/1
10 TABLET, FILM COATED ORAL AT BEDTIME
Refills: 0 | Status: DISCONTINUED | OUTPATIENT
Start: 2024-01-29 | End: 2024-02-01

## 2024-01-29 RX ORDER — OLANZAPINE 15 MG/1
5 TABLET, FILM COATED ORAL EVERY 8 HOURS
Refills: 0 | Status: DISCONTINUED | OUTPATIENT
Start: 2024-01-29 | End: 2024-02-07

## 2024-01-29 RX ORDER — OLANZAPINE 15 MG/1
5 TABLET, FILM COATED ORAL DAILY
Refills: 0 | Status: DISCONTINUED | OUTPATIENT
Start: 2024-01-30 | End: 2024-02-01

## 2024-01-29 RX ADMIN — Medication 650 MILLIGRAM(S): at 16:32

## 2024-01-29 RX ADMIN — OLANZAPINE 5 MILLIGRAM(S): 15 TABLET, FILM COATED ORAL at 08:35

## 2024-01-29 RX ADMIN — OLANZAPINE 10 MILLIGRAM(S): 15 TABLET, FILM COATED ORAL at 20:12

## 2024-01-29 NOTE — PROGRESS NOTE ADULT - ASSESSMENT
43-year-old man,, single with no significant PMH and with PPH of schizoaffective disorder, substance use disorder (alcohol, cannabis, cocaine), substance induced psychosis, PTSD, multiple prior hospitalizations (most recently to [facility] in 11/2023), reported history of suicide attempts, prior legal history of drug-related charges, who was BIB self after trying to hang himself with a belt from command auditory hallucinations.    ID is following for fever  Afebrile on admission, no leukocytosis  Found to have COVID, but asymptomatic  S/p isolation for 10 days, no RDV or Decadron given  Initial CXR showed no PNA  Started spiking fever on 1/22 with body ache  No other localizing symptoms  Repeat CXR no PNA  RVP negative    CMV IgM/IgG negative  EBV serologies indicate past infection  Lyme negative  HIV negative  Acute hepatitis panel negative  Fever resolved on 1/25 spontaneously  Patient remains asymptomatic      IMPRESSION:  Likely viral syndrome  COVID  Bicytopenia, resolved  Transaminitis  Schizophrenia  Substance use disorder    RECOMMENDATIONS:  - Likely 2/2 viral syndrome could be COVID, so far infectious work up unremarkable  - Observe off abx for now  - Follow up tick-borne disease panel  - Continue to monitor symptoms  - Psychiatric care as per , monitor adverse effects on psych meds      * THIS IS AN INCOMPLETE NOTE. FINAL RECOMMENDATION IS PENDING *   43-year-old man,, single with no significant PMH and with PPH of schizoaffective disorder, substance use disorder (alcohol, cannabis, cocaine), substance induced psychosis, PTSD, multiple prior hospitalizations (most recently to [facility] in 11/2023), reported history of suicide attempts, prior legal history of drug-related charges, who was BIB self after trying to hang himself with a belt from command auditory hallucinations.    ID is following for fever  Afebrile on admission, no leukocytosis  Found to have COVID, but asymptomatic  S/p isolation for 10 days, no RDV or Decadron given  Initial CXR showed no PNA  Started spiking fever on 1/22 with body ache  No other localizing symptoms  Repeat CXR no PNA  RVP negative    CMV IgM/IgG negative  EBV serologies indicate past infection  Lyme negative  HIV negative  Acute hepatitis panel negative  Fever resolved on 1/25 spontaneously  Patient remains asymptomatic      IMPRESSION:  Likely viral syndrome  COVID  Bicytopenia, resolved  Transaminitis  Schizophrenia  Substance use disorder    RECOMMENDATIONS:  - Likely combination of COVID and drug-related, so far infectious work up is unremarkable  - Observe off abx for now  - Follow up tick-borne disease panel  - Continue to monitor symptoms  - Psychiatric care as per , monitor adverse effects on psych meds  - Will sign off. Please recall ID PRN for further questions      Linda Canela D.O.  Attending Physician  Division of Infectious Diseases  [facility]  Please contact me via [employer] Teams

## 2024-01-29 NOTE — PROGRESS NOTE ADULT - SUBJECTIVE AND OBJECTIVE BOX
INFECTIOUS DISEASE FOLLOW UP NOTE:    Interval History/ROS: Patient is a 43y old  Male who presents with a chief complaint of Fever (25 Jan 2024 14:56)      Overnight events:    REVIEW OF SYSTEMS:        Prior hospital charts reviewed [Yes]  Primary team notes reviewed [Yes]  Other consultant notes reviewed [Yes]    Allergies:  No Known Allergies      ANTIMICROBIALS:       OTHER MEDS: MEDICATIONS  (STANDING):  acetaminophen     Tablet .. 650 every 6 hours PRN  aluminum hydroxide/magnesium hydroxide/simethicone Suspension 30 every 6 hours PRN  diphenhydrAMINE 50 every 6 hours PRN  hydrOXYzine hydrochloride 50 every 6 hours PRN  LORazepam     Tablet 2 every 6 hours PRN  melatonin 5 at bedtime PRN  OLANZapine 5 two times a day      Vital Signs Last 24 Hrs  T(F): 97.2 (01-29-24 @ 07:58), Max: 101.5 (01-22-24 @ 15:42)    Vital Signs Last 24 Hrs  HR: 86 (01-29-24 @ 07:58) (86 - 88)  BP: 110/62 (01-29-24 @ 07:58) (94/63 - 139/88)  RR: 18 (01-29-24 @ 07:58)  SpO2: --  Wt(kg): --    EXAM:      Labs:                        13.1   8.27  )-----------( 181      ( 29 Jan 2024 08:20 )             39.0             WBC Trend:  WBC Count: 8.27 (01-29-24 @ 08:20)  WBC Count: 5.30 (01-27-24 @ 09:17)  WBC Count: 4.08 (01-26-24 @ 08:00)  WBC Count: 3.97 (01-25-24 @ 20:00)      Creatine Trend:  Creatinine: 1.0 (01-26)  Creatinine: 1.1 (01-25)  Creatinine: 1.1 (01-25)  Creatinine: 1.0 (01-24)      Liver Biochemical Testing Trend:  Alanine Aminotransferase (ALT/SGPT): 158 *H* (01-26)  Alanine Aminotransferase (ALT/SGPT): 177 *H* (01-25)  Alanine Aminotransferase (ALT/SGPT): 183 *H* (01-25)  Alanine Aminotransferase (ALT/SGPT): 148 *H* (01-24)  Alanine Aminotransferase (ALT/SGPT): 34 (01-22)  Aspartate Aminotransferase (AST/SGOT): 75 (01-26-24 @ 08:00)  Aspartate Aminotransferase (AST/SGOT): 98 (01-25-24 @ 20:00)  Aspartate Aminotransferase (AST/SGOT): 113 (01-25-24 @ 08:00)  Aspartate Aminotransferase (AST/SGOT): 105 (01-24-24 @ 15:50)  Aspartate Aminotransferase (AST/SGOT): 24 (01-22-24 @ 17:02)  Bilirubin Total: 0.8 (01-26)  Bilirubin Direct: 0.2 (01-25)  Bilirubin Total: 0.8 (01-25)  Bilirubin Total: 0.7 (01-25)  Bilirubin Total: 0.8 (01-24)      Trend LDH          MICROBIOLOGY:        Culture - Blood (collected 24 Jan 2024 15:50)  Source: .Blood Blood-Peripheral  Preliminary Report:    No growth at 4 days    Culture - Blood (collected 24 Jan 2024 15:50)  Source: .Blood Blood-Peripheral  Preliminary Report:    No growth at 4 days    Culture - Urine (collected 08 Apr 2023 03:20)  Source: Clean Catch Clean Catch (Midstream)  Final Report:    <10,000 CFU/mL Normal Urogenital Loretta      HIV-1/2 Combo Result: Nonreact (01-26-24 @ 08:00)      CMV IgG Interpretation: Negative (01-26-24 @ 08:00)  CMV IgM Interpretation: Negative (01-26-24 @ 08:00)      Rapid RVP Result: NotDetec (01-24 @ 18:10)    COVID-19 PCR: Detected (01-13-24 @ 09:53)    COVID-19 Flip Domain AB Interp: Positive (07-10-23 @ 07:10)      RADIOLOGY:  imaging below personally reviewed    < from: US Abdomen Upper Quadrant Right (01.25.24 @ 16:37) >    IMPRESSION:    Prominent common bile duct measuring 7 mm, upper limits of normal.    Correlation with laboratory values is recommended.    Hypoechoicstructure anterior to the pancreatic head measuring 2.1 x 1.3   x 0.8 cm, possibly representing a prominent peripancreatic lymph node.    Further evaluation with a postcontrast abdominal pelvic CT is recommended.    Gallbladder polyps measuring up to6 mm. Follow-up right upper quadrant   ultrasound is recommended in 6 months to demonstrate stability.    < end of copied text >    < from: Xray Chest 2 Views PA/Lat (01.24.24 @ 15:30) >  IMPRESSION:    No radiographic evidence of acute cardiopulmonary disease.    < end of copied text >   INFECTIOUS DISEASE FOLLOW UP NOTE:    Interval History/ROS: Patient is a 43y old  Male who presents with a chief complaint of Fever (25 Jan 2024 14:56)      Overnight events: No overnight event. Feels well so far.    REVIEW OF SYSTEMS:  CONSTITUTIONAL: No fever or chills  HEAD: No lesion on scalp  EYES: No visual disturbance  ENT: No sore throat  RESPIRATORY: No cough, no shortness of breath  CARDIOVASCULAR: No chest pain or palpitations  GASTROINTESTINAL: No abdominal or epigastric pain  GENITOURINARY: No dysuria  NEUROLOGICAL: No headache/dizziness  MUSCULOSKELETAL: No joint pain, erythema, or swelling; no back pain  SKIN: No itching, rashes  All other ROS negative except noted above      Prior hospital charts reviewed [Yes]  Primary team notes reviewed [Yes]  Other consultant notes reviewed [Yes]    Allergies:  No Known Allergies      ANTIMICROBIALS:       OTHER MEDS: MEDICATIONS  (STANDING):  acetaminophen     Tablet .. 650 every 6 hours PRN  aluminum hydroxide/magnesium hydroxide/simethicone Suspension 30 every 6 hours PRN  diphenhydrAMINE 50 every 6 hours PRN  hydrOXYzine hydrochloride 50 every 6 hours PRN  LORazepam     Tablet 2 every 6 hours PRN  melatonin 5 at bedtime PRN  OLANZapine 5 two times a day      Vital Signs Last 24 Hrs  T(F): 97.2 (01-29-24 @ 07:58), Max: 101.5 (01-22-24 @ 15:42)    Vital Signs Last 24 Hrs  HR: 86 (01-29-24 @ 07:58) (86 - 88)  BP: 110/62 (01-29-24 @ 07:58) (94/63 - 139/88)  RR: 18 (01-29-24 @ 07:58)  SpO2: --  Wt(kg): --    EXAM:  GENERAL: NAD, lying in bed  HEAD: No head lesions  EYES: Conjunctiva pink and cornea white  EAR, NOSE, MOUTH, THROAT: Normal external ears and nose, no discharges; moist mucous membranes  NECK: Supple, nontender to palpation; no JVD  RESPIRATORY: Clear to auscultation bilaterally  CARDIOVASCULAR: S1 S2  GASTROINTESTINAL: Soft, nontender, nondistended; normoactive bowel sounds  EXTREMITIES: No clubbing, cyanosis, or petal edema  NERVOUS SYSTEM: Alert and oriented to person, time, place and situation, speech clear. No focal deficits   MUSCULOSKELETAL: No joint erythema, swelling or pain  SKIN: Dry skins on bilateral LEs, no superficial thrombophlebitis  PSYCH: Normal affect    Labs:                        13.1   8.27  )-----------( 181      ( 29 Jan 2024 08:20 )             39.0         WBC Trend:  WBC Count: 8.27 (01-29-24 @ 08:20)  WBC Count: 5.30 (01-27-24 @ 09:17)  WBC Count: 4.08 (01-26-24 @ 08:00)  WBC Count: 3.97 (01-25-24 @ 20:00)      Creatine Trend:  Creatinine: 1.0 (01-26)  Creatinine: 1.1 (01-25)  Creatinine: 1.1 (01-25)  Creatinine: 1.0 (01-24)      Liver Biochemical Testing Trend:  Alanine Aminotransferase (ALT/SGPT): 158 *H* (01-26)  Alanine Aminotransferase (ALT/SGPT): 177 *H* (01-25)  Alanine Aminotransferase (ALT/SGPT): 183 *H* (01-25)  Alanine Aminotransferase (ALT/SGPT): 148 *H* (01-24)  Alanine Aminotransferase (ALT/SGPT): 34 (01-22)  Aspartate Aminotransferase (AST/SGOT): 75 (01-26-24 @ 08:00)  Aspartate Aminotransferase (AST/SGOT): 98 (01-25-24 @ 20:00)  Aspartate Aminotransferase (AST/SGOT): 113 (01-25-24 @ 08:00)  Aspartate Aminotransferase (AST/SGOT): 105 (01-24-24 @ 15:50)  Aspartate Aminotransferase (AST/SGOT): 24 (01-22-24 @ 17:02)  Bilirubin Total: 0.8 (01-26)  Bilirubin Direct: 0.2 (01-25)  Bilirubin Total: 0.8 (01-25)  Bilirubin Total: 0.7 (01-25)  Bilirubin Total: 0.8 (01-24)      Trend LDH          MICROBIOLOGY:        Culture - Blood (collected 24 Jan 2024 15:50)  Source: .Blood Blood-Peripheral  Preliminary Report:    No growth at 4 days    Culture - Blood (collected 24 Jan 2024 15:50)  Source: .Blood Blood-Peripheral  Preliminary Report:    No growth at 4 days    Culture - Urine (collected 08 Apr 2023 03:20)  Source: Clean Catch Clean Catch (Midstream)  Final Report:    <10,000 CFU/mL Normal Urogenital Loretta      HIV-1/2 Combo Result: Nonreact (01-26-24 @ 08:00)      CMV IgG Interpretation: Negative (01-26-24 @ 08:00)  CMV IgM Interpretation: Negative (01-26-24 @ 08:00)      Rapid RVP Result: NotDetec (01-24 @ 18:10)    COVID-19 PCR: Detected (01-13-24 @ 09:53)    COVID-19 Flip Domain AB Interp: Positive (07-10-23 @ 07:10)      RADIOLOGY:  imaging below personally reviewed    < from: US Abdomen Upper Quadrant Right (01.25.24 @ 16:37) >    IMPRESSION:    Prominent common bile duct measuring 7 mm, upper limits of normal.    Correlation with laboratory values is recommended.    Hypoechoicstructure anterior to the pancreatic head measuring 2.1 x 1.3   x 0.8 cm, possibly representing a prominent peripancreatic lymph node.    Further evaluation with a postcontrast abdominal pelvic CT is recommended.    Gallbladder polyps measuring up to6 mm. Follow-up right upper quadrant   ultrasound is recommended in 6 months to demonstrate stability.    < end of copied text >    < from: Xray Chest 2 Views PA/Lat (01.24.24 @ 15:30) >  IMPRESSION:    No radiographic evidence of acute cardiopulmonary disease.    < end of copied text >

## 2024-01-29 NOTE — BH INPATIENT PSYCHIATRY PROGRESS NOTE - PRN MEDS
MEDICATIONS  (PRN):  acetaminophen     Tablet .. 650 milliGRAM(s) Oral every 6 hours PRN Temp greater or equal to 38C (100.4F), Mild Pain (1 - 3), Moderate Pain (4 - 6)  aluminum hydroxide/magnesium hydroxide/simethicone Suspension 30 milliLiter(s) Oral every 6 hours PRN Dyspepsia  diphenhydrAMINE 50 milliGRAM(s) Oral every 6 hours PRN Extrapyramidal prophylaxis  hydrOXYzine hydrochloride 50 milliGRAM(s) Oral every 6 hours PRN anxiety/insomnia  LORazepam     Tablet 2 milliGRAM(s) Oral every 6 hours PRN agitation  melatonin 5 milliGRAM(s) Oral at bedtime PRN Insomnia  nicotine  Polacrilex Gum 2 milliGRAM(s) Oral every 2 hours PRN smoking cessation   MEDICATIONS  (PRN):  acetaminophen     Tablet .. 650 milliGRAM(s) Oral every 6 hours PRN Temp greater or equal to 38C (100.4F), Mild Pain (1 - 3), Moderate Pain (4 - 6)  aluminum hydroxide/magnesium hydroxide/simethicone Suspension 30 milliLiter(s) Oral every 6 hours PRN Dyspepsia  diphenhydrAMINE 50 milliGRAM(s) Oral every 6 hours PRN Extrapyramidal prophylaxis  hydrOXYzine hydrochloride 50 milliGRAM(s) Oral every 6 hours PRN anxiety/insomnia  melatonin 5 milliGRAM(s) Oral at bedtime PRN Insomnia  nicotine  Polacrilex Gum 2 milliGRAM(s) Oral every 2 hours PRN smoking cessation  OLANZapine 5 milliGRAM(s) Oral every 8 hours PRN agitation

## 2024-01-29 NOTE — PROGRESS NOTE ADULT - TIME BILLING
I have personally seen and examined this patient.    I have reviewed all pertinent clinical information and reviewed all relevant imaging and diagnostic studies personally.   I discussed recommendations with the primary team.
I have personally seen and examined this patient.    I have reviewed all pertinent clinical information and reviewed all relevant imaging and diagnostic studies personally.   I discussed recommendations with the primary team.

## 2024-01-29 NOTE — BH INPATIENT PSYCHIATRY PROGRESS NOTE - NSBHCHARTREVIEWVS_PSY_A_CORE FT
Vital Signs Last 24 Hrs  T(C): 36.2 (01-29-24 @ 07:58), Max: 36.4 (01-29-24 @ 03:38)  T(F): 97.2 (01-29-24 @ 07:58), Max: 97.5 (01-29-24 @ 03:38)  HR: 86 (01-29-24 @ 07:58) (86 - 88)  BP: 110/62 (01-29-24 @ 07:58) (94/63 - 139/88)  BP(mean): --  RR: 18 (01-29-24 @ 07:58) (16 - 18)  SpO2: --

## 2024-01-29 NOTE — BH INPATIENT PSYCHIATRY PROGRESS NOTE - NSBHASSESSSUMMFT_PSY_ALL_CORE
43-year-old man,, single with no significant PMH and with PPH of schizoaffective disorder, substance use disorder (alcohol, cannabis, cocaine), substance induced psychosis, PTSD, multiple prior hospitalizations (most recently to [facility] in 11/2023), reported history of suicide attempts, prior legal history of drug-related charges, who was BIB self after trying to hang himself with a belt from command auditory hallucinations. Pt presented with depressed mood, anhedonia, and aborted suicide attempt via hanging self with a belt, in the context of financial stressors and feelings of hopelessness. The patient has history of multiple prior hospitalizations and ED visits, is a poor utilizer of outpatient services, and has history of substance abuse. Pt will likely benefit from IPP at this time.    On evaluation, pt presents alert, linear and AOx3, with improved focus and ADLs. Of note, pt was with intermittent fevers, negative NMS w/u, noted with lab abnormalities and being followed by ID. Pt currently afebrile and denying any physical complaints, no rigidity on exam. Haldol discontinued d/t concerns of drug-induced etiology. Pt reported continued AH telling him to harm self, denied intent and plan to harm self. Pt agreed to trial zyprexa; labs uptrending. He is future-oriented, requesting to speak with SW regarding housing and social security benefits.     #Schizoaffective d/o  -start zyprexa 5 mg daily (1/26), titrate zyprexa 5 mg bid (1/27) --> titrate zyprexa 5 mg qAM/ 10 mg qHS (1/29)  -d/c haldol 10 mg bid d/t lab abnormalities  -hold cogentin 0.5 mg bid for eps ppx  -hold remeron 15 mg qhs    #Leukopenia/neutropenia, thrombocytopenia  -improving  -trend CBC with diff with initiation of zyprexa  -WBC 7.29 (1/22) --> 2.46 (1/25) --> 4.08 (1/26)  -ANC 1328 (1/25) --> 2521 (1/26)  -Plts 92 (1/25) --> 110 (1/26)  -ID consult  -medicine consult pending    #H/o polysubstance use  -pt denies recent use  -UDS unable to be collected    #Agitation  -for agitation not amenable to verbal redirection, may give haldol 5 mg q6h prn and ativan 2 mg q6h prn with escalation to IM if pt is refusing PO and is an acute danger to self or/and others with repeat EKG to ensure QTc <500 ms     43-year-old man,, single with no significant PMH and with PPH of schizoaffective disorder, substance use disorder (alcohol, cannabis, cocaine), substance induced psychosis, PTSD, multiple prior hospitalizations (most recently to [facility] [place] operated by [facility] in 11/2023), reported history of suicide attempts, prior legal history of drug-related charges, who was BIB self after trying to hang himself with a belt from command auditory hallucinations. Pt presented with depressed mood, anhedonia, and aborted suicide attempt via hanging self with a belt, in the context of financial stressors and feelings of hopelessness. The patient has history of multiple prior hospitalizations and ED visits, is a poor utilizer of outpatient services, and has history of substance abuse. Pt will likely benefit from IPP at this time.    On evaluation, pt presents alert, linear and AOx3, with improved focus and ADLs. Of note, pt was with intermittent fevers, negative NMS w/u, noted with lab abnormalities and being followed by ID. Pt currently afebrile and denying any physical complaints, no rigidity on exam. Haldol discontinued d/t concerns of drug-induced etiology. Pt reported continued AH telling him to harm self, denied intent and plan to harm self. Pt agreed to trial zyprexa; labs uptrending. He is future-oriented, requesting to speak with SW regarding housing and social security benefits.     #Schizoaffective d/o  -start zyprexa 5 mg daily (1/26), titrate zyprexa 5 mg bid (1/27) --> titrate zyprexa 5 mg qAM/ 10 mg qHS (1/29)  -d/c haldol 10 mg bid d/t lab abnormalities  -hold cogentin 0.5 mg bid for eps ppx  -hold remeron 15 mg qhs    #Leukopenia/neutropenia, thrombocytopenia  -improving  -trend CBC with diff with initiation of zyprexa  -WBC 7.29 (1/22) --> 2.46 (1/25) --> 4.08 (1/26)  -ANC 1328 (1/25) --> 2521 (1/26)  -Plts 92 (1/25) --> 110 (1/26)  -ID consult  -medicine consult pending    #H/o polysubstance use  -pt denies recent use  -UDS unable to be collected    #Agitation  -for agitation not amenable to verbal redirection, may give zyprexa 5 mg q6h prn with escalation to IM if pt is refusing PO and is an acute danger to self or/and others

## 2024-01-29 NOTE — BH INPATIENT PSYCHIATRY PROGRESS NOTE - NSBHMETABOLIC_PSY_ALL_CORE_FT
BMI: BMI (kg/m2): 24.6 (01-14-24 @ 12:21)  HbA1c: A1C with Estimated Average Glucose Result: 5.1 % (01-15-24 @ 08:34)    Glucose: POCT Blood Glucose.: 128 mg/dL (01-13-24 @ 04:49)    BP: 110/62 (01-29-24 @ 07:58) (94/63 - 139/88)Vital Signs Last 24 Hrs  T(C): 36.2 (01-29-24 @ 07:58), Max: 36.4 (01-29-24 @ 03:38)  T(F): 97.2 (01-29-24 @ 07:58), Max: 97.5 (01-29-24 @ 03:38)  HR: 86 (01-29-24 @ 07:58) (86 - 88)  BP: 110/62 (01-29-24 @ 07:58) (94/63 - 139/88)  BP(mean): --  RR: 18 (01-29-24 @ 07:58) (16 - 18)  SpO2: --      Lipid Panel: Date/Time: 01-15-24 @ 08:34  Cholesterol, Serum: 110  LDL Cholesterol Calculated: 39  HDL Cholesterol, Serum: 40  Total Cholesterol/HDL Ration Measurement: --  Triglycerides, Serum: 154

## 2024-01-29 NOTE — BH INPATIENT PSYCHIATRY PROGRESS NOTE - CURRENT MEDICATION
MEDICATIONS  (STANDING):  OLANZapine 5 milliGRAM(s) Oral two times a day    MEDICATIONS  (PRN):  acetaminophen     Tablet .. 650 milliGRAM(s) Oral every 6 hours PRN Temp greater or equal to 38C (100.4F), Mild Pain (1 - 3), Moderate Pain (4 - 6)  aluminum hydroxide/magnesium hydroxide/simethicone Suspension 30 milliLiter(s) Oral every 6 hours PRN Dyspepsia  diphenhydrAMINE 50 milliGRAM(s) Oral every 6 hours PRN Extrapyramidal prophylaxis  hydrOXYzine hydrochloride 50 milliGRAM(s) Oral every 6 hours PRN anxiety/insomnia  LORazepam     Tablet 2 milliGRAM(s) Oral every 6 hours PRN agitation  melatonin 5 milliGRAM(s) Oral at bedtime PRN Insomnia  nicotine  Polacrilex Gum 2 milliGRAM(s) Oral every 2 hours PRN smoking cessation   MEDICATIONS  (STANDING):  OLANZapine 10 milliGRAM(s) Oral at bedtime    MEDICATIONS  (PRN):  acetaminophen     Tablet .. 650 milliGRAM(s) Oral every 6 hours PRN Temp greater or equal to 38C (100.4F), Mild Pain (1 - 3), Moderate Pain (4 - 6)  aluminum hydroxide/magnesium hydroxide/simethicone Suspension 30 milliLiter(s) Oral every 6 hours PRN Dyspepsia  diphenhydrAMINE 50 milliGRAM(s) Oral every 6 hours PRN Extrapyramidal prophylaxis  hydrOXYzine hydrochloride 50 milliGRAM(s) Oral every 6 hours PRN anxiety/insomnia  melatonin 5 milliGRAM(s) Oral at bedtime PRN Insomnia  nicotine  Polacrilex Gum 2 milliGRAM(s) Oral every 2 hours PRN smoking cessation  OLANZapine 5 milliGRAM(s) Oral every 8 hours PRN agitation

## 2024-01-29 NOTE — BH INPATIENT PSYCHIATRY PROGRESS NOTE - NSBHFUPINTERVALHXFT_PSY_A_CORE
Pt seen and evaluated, chart reviewed; labs noted, ID following. As per nursing report, no acute events overnight. On evaluation, pt presents alert, linear and AOx3, anxious and cooperative. He reports he continues with AH, states a little improved over the weekend and less loud, AH telling him to "kill myself". He denies intent or plan to harm self, is future-oriented and endorses hope of AH returning to baseline. Pt reports reasons to live include his family. Pt able to safety plan. He reports resolution of VH. He denies paranoia, states he feels safe. He denies SI/HI, intent and plan. Pt adherent to medications, denies negative side effects. In behavioral control. Pt visible on unit and encouraged groups/DBT.

## 2024-01-30 PROCEDURE — 99231 SBSQ HOSP IP/OBS SF/LOW 25: CPT

## 2024-01-30 RX ADMIN — OLANZAPINE 5 MILLIGRAM(S): 15 TABLET, FILM COATED ORAL at 08:23

## 2024-01-30 RX ADMIN — OLANZAPINE 10 MILLIGRAM(S): 15 TABLET, FILM COATED ORAL at 20:12

## 2024-01-30 RX ADMIN — Medication 650 MILLIGRAM(S): at 16:19

## 2024-01-30 RX ADMIN — Medication 650 MILLIGRAM(S): at 15:44

## 2024-01-30 NOTE — BH TREATMENT PLAN - NSTXPATIENTPARTICIPATE_PSY_ALL_CORE
Patient participated in identification of needs/problems/goals for treatment/Patient participated in defining interventions/Patient participated in development of after care plan Patient participated in identification of needs/problems/goals for treatment/Patient participated in development of after care plan

## 2024-01-30 NOTE — BH INPATIENT PSYCHIATRY PROGRESS NOTE - NSBHMETABOLIC_PSY_ALL_CORE_FT
BMI: BMI (kg/m2): 24.6 (01-14-24 @ 12:21)  HbA1c: A1C with Estimated Average Glucose Result: 5.1 % (01-15-24 @ 08:34)    Glucose: POCT Blood Glucose.: 128 mg/dL (01-13-24 @ 04:49)    BP: 96/56 (01-30-24 @ 08:19) (94/63 - 139/88)Vital Signs Last 24 Hrs  T(C): 35.6 (01-30-24 @ 08:19), Max: 37.8 (01-29-24 @ 16:30)  T(F): 96.1 (01-30-24 @ 08:19), Max: 100 (01-29-24 @ 16:30)  HR: 115 (01-30-24 @ 08:19) (82 - 115)  BP: 96/56 (01-30-24 @ 08:19) (95/76 - 103/51)  BP(mean): --  RR: 18 (01-30-24 @ 08:19) (18 - 18)  SpO2: --      Lipid Panel: Date/Time: 01-15-24 @ 08:34  Cholesterol, Serum: 110  LDL Cholesterol Calculated: 39  HDL Cholesterol, Serum: 40  Total Cholesterol/HDL Ration Measurement: --  Triglycerides, Serum: 154   BMI: BMI (kg/m2): 24.6 (01-14-24 @ 12:21)  HbA1c: A1C with Estimated Average Glucose Result: 5.1 % (01-15-24 @ 08:34)    Glucose: POCT Blood Glucose.: 128 mg/dL (01-13-24 @ 04:49)    BP: 124/63 (01-30-24 @ 15:39) (94/63 - 139/88)Vital Signs Last 24 Hrs  T(C): 37.9 (01-30-24 @ 15:39), Max: 37.9 (01-30-24 @ 15:39)  T(F): 100.2 (01-30-24 @ 15:39), Max: 100.2 (01-30-24 @ 15:39)  HR: 104 (01-30-24 @ 15:39) (82 - 115)  BP: 124/63 (01-30-24 @ 15:39) (95/76 - 124/63)  BP(mean): --  RR: 16 (01-30-24 @ 15:39) (16 - 18)  SpO2: 97% (01-30-24 @ 15:39) (97% - 97%)      Lipid Panel: Date/Time: 01-15-24 @ 08:34  Cholesterol, Serum: 110  LDL Cholesterol Calculated: 39  HDL Cholesterol, Serum: 40  Total Cholesterol/HDL Ration Measurement: --  Triglycerides, Serum: 154

## 2024-01-30 NOTE — BH INPATIENT PSYCHIATRY PROGRESS NOTE - CURRENT MEDICATION
MEDICATIONS  (STANDING):  OLANZapine 10 milliGRAM(s) Oral at bedtime  OLANZapine 5 milliGRAM(s) Oral daily    MEDICATIONS  (PRN):  acetaminophen     Tablet .. 650 milliGRAM(s) Oral every 6 hours PRN Temp greater or equal to 38C (100.4F), Mild Pain (1 - 3), Moderate Pain (4 - 6)  aluminum hydroxide/magnesium hydroxide/simethicone Suspension 30 milliLiter(s) Oral every 6 hours PRN Dyspepsia  hydrOXYzine hydrochloride 50 milliGRAM(s) Oral every 6 hours PRN anxiety/insomnia  melatonin 5 milliGRAM(s) Oral at bedtime PRN Insomnia  nicotine  Polacrilex Gum 2 milliGRAM(s) Oral every 2 hours PRN smoking cessation  OLANZapine 5 milliGRAM(s) Oral every 8 hours PRN agitation

## 2024-01-30 NOTE — BH TREATMENT PLAN - NSTXSUICIDINTERRN_PSY_ALL_CORE
RN to assess patients behavior and be alert for increased signs of impulsivity/agitation.  RN will monitor patient and provide support and comfort and provedie safety on unit.    RN to encourage medication compliance and provide support and education as needed on Dx, coping skills, medication, and safety planning.  RN to Encourage daily ADL's  RN to Encourage group attendance  RN will assess and intervene for any suicidal ideations

## 2024-01-30 NOTE — BH INPATIENT PSYCHIATRY PROGRESS NOTE - NSBHASSESSSUMMFT_PSY_ALL_CORE
43-year-old man,, single with no significant PMH and with PPH of schizoaffective disorder, substance use disorder (alcohol, cannabis, cocaine), substance induced psychosis, PTSD, multiple prior hospitalizations (most recently to [facility] in 11/2023), reported history of suicide attempts, prior legal history of drug-related charges, who was BIB self after trying to hang himself with a belt from command auditory hallucinations. Pt presented with depressed mood, anhedonia, and aborted suicide attempt via hanging self with a belt, in the context of financial stressors and feelings of hopelessness. The patient has history of multiple prior hospitalizations and ED visits, is a poor utilizer of outpatient services, and has history of substance abuse. Pt will likely benefit from IPP at this time.    On evaluation, pt presents alert, linear and AOx3, with improved focus and ADLs. Of note, pt was with intermittent fevers, negative NMS w/u, seen by ID and cleared with negative w/u. Haldol discontinued d/t concern of drug-induced etiology, with improvement in labs and VS. Pt reported continued AH telling him to harm self, denied intent and plan to harm self. Pt agreed to trial zyprexa; CBC to be trended. He is help-seeking and future-oriented, able to safety plan, has not been a behavioral concern.    #Schizoaffective d/o  -start zyprexa 5 mg daily (1/26), titrate zyprexa 5 mg bid (1/27) --> titrate zyprexa 5 mg qAM/ 10 mg qHS (1/29)  -d/c haldol 10 mg bid d/t lab abnormalities  -hold cogentin 0.5 mg bid for eps ppx  -hold remeron 15 mg qhs    #Leukopenia/neutropenia, thrombocytopenia  -improving  -trend CBC with diff with initiation of zyprexa, f/u CBC in AM  -ID consult  -medicine consult    #H/o polysubstance use  -pt denies recent use  -UDS unable to be collected    #Agitation  -for agitation not amenable to verbal redirection, may give zyprexa 5 mg q6h prn with escalation to IM if pt is refusing PO and is an acute danger to self or/and others

## 2024-01-30 NOTE — BH TREATMENT PLAN - NSTXDEPRESINTERPR_PSY_ALL_CORE
Rt will continue to offer support encouragement and motivation.
Rt will continue to offer support encouragement and motivation.

## 2024-01-30 NOTE — BH INPATIENT PSYCHIATRY PROGRESS NOTE - NSBHCHARTREVIEWVS_PSY_A_CORE FT
Vital Signs Last 24 Hrs  T(C): 35.6 (01-30-24 @ 08:19), Max: 37.8 (01-29-24 @ 16:30)  T(F): 96.1 (01-30-24 @ 08:19), Max: 100 (01-29-24 @ 16:30)  HR: 115 (01-30-24 @ 08:19) (82 - 115)  BP: 96/56 (01-30-24 @ 08:19) (95/76 - 103/51)  BP(mean): --  RR: 18 (01-30-24 @ 08:19) (18 - 18)  SpO2: --     Vital Signs Last 24 Hrs  T(C): 37.9 (01-30-24 @ 15:39), Max: 37.9 (01-30-24 @ 15:39)  T(F): 100.2 (01-30-24 @ 15:39), Max: 100.2 (01-30-24 @ 15:39)  HR: 104 (01-30-24 @ 15:39) (82 - 115)  BP: 124/63 (01-30-24 @ 15:39) (95/76 - 124/63)  BP(mean): --  RR: 16 (01-30-24 @ 15:39) (16 - 18)  SpO2: 97% (01-30-24 @ 15:39) (97% - 97%)

## 2024-01-30 NOTE — BH INPATIENT PSYCHIATRY PROGRESS NOTE - PRN MEDS
MEDICATIONS  (PRN):  acetaminophen     Tablet .. 650 milliGRAM(s) Oral every 6 hours PRN Temp greater or equal to 38C (100.4F), Mild Pain (1 - 3), Moderate Pain (4 - 6)  aluminum hydroxide/magnesium hydroxide/simethicone Suspension 30 milliLiter(s) Oral every 6 hours PRN Dyspepsia  hydrOXYzine hydrochloride 50 milliGRAM(s) Oral every 6 hours PRN anxiety/insomnia  melatonin 5 milliGRAM(s) Oral at bedtime PRN Insomnia  nicotine  Polacrilex Gum 2 milliGRAM(s) Oral every 2 hours PRN smoking cessation  OLANZapine 5 milliGRAM(s) Oral every 8 hours PRN agitation

## 2024-01-30 NOTE — BH INPATIENT PSYCHIATRY PROGRESS NOTE - NSBHFUPINTERVALHXFT_PSY_A_CORE
Pt seen and evaluated, chart reviewed. As per nursing report, pt with low grade fever overnight, responsive to PRN tylenol, afebrile this morning. ID consult appreciated, negative workup. On evaluation, pt presents alert, linear and AOx3, anxious and cooperative. He denies physical complaints. He reports he continues with AH, states "back to back", admits to less loud than the weekend. He reports AH telling him to "kill myself". He denies intent or plan to harm self, is future-oriented and endorses hope of AH returning to baseline. Pt reports reasons to live include his family. Pt able to safety plan. He reports resolution of VH. He denies paranoia, states he feels safe. He denies SI/HI, intent and plan. Pt adherent to medications, denies negative side effects. In behavioral control. Pt visible on unit and encouraged groups/DBT. Pt seen and evaluated, chart reviewed. As per nursing report, pt with low grade fever overnight, responsive to PRN tylenol, afebrile this morning. ID consult appreciated, negative workup. On evaluation, pt presents alert, linear and AOx3, anxious and cooperative. He denies physical complaints. He reports he continues with AH, states "back to back", admits to less loud than the weekend. He reports AH telling him to "kill myself". He denies intent or plan to harm self, is future-oriented and endorses hope of AH returning to baseline. Pt reports reasons to live include his family. Pt able to safety plan. He reports resolution of VH. He denies paranoia, states he feels safe. He denies SI/HI, intent and plan. Pt adherent to medications, denies negative side effects. In behavioral control. Pt visible on unit and encouraged groups/DBT.    Later in day, pt febrile 100.2F; pt denies flu-like sx, AOx3, calm and cooperative, negative rigidity on exam. Case discussed with Dr. Leyva; pending hospitalist consult, CBC ordered for AM, VS q4-6h

## 2024-01-30 NOTE — BH INPATIENT PSYCHIATRY PROGRESS NOTE - NSBHPROGMEDSE_PSY_A_CORE FT
leukopenia/neutropenia and thrombocytopenia, haldol discontinued, ID/medicine consults --> improved s/p haldol discontinuation; pt agreed to start of zyprexa, CBC to be trended

## 2024-01-31 LAB
BASOPHILS # BLD AUTO: 0.06 K/UL — SIGNIFICANT CHANGE UP (ref 0–0.2)
BASOPHILS NFR BLD AUTO: 0.6 % — SIGNIFICANT CHANGE UP (ref 0–1)
EOSINOPHIL # BLD AUTO: 0.21 K/UL — SIGNIFICANT CHANGE UP (ref 0–0.7)
EOSINOPHIL NFR BLD AUTO: 2.2 % — SIGNIFICANT CHANGE UP (ref 0–8)
HCT VFR BLD CALC: 41.4 % — LOW (ref 42–52)
HGB BLD-MCNC: 14.5 G/DL — SIGNIFICANT CHANGE UP (ref 14–18)
IMM GRANULOCYTES NFR BLD AUTO: 4.1 % — HIGH (ref 0.1–0.3)
LYMPHOCYTES # BLD AUTO: 0.58 K/UL — LOW (ref 1.2–3.4)
LYMPHOCYTES # BLD AUTO: 6.1 % — LOW (ref 20.5–51.1)
MCHC RBC-ENTMCNC: 26.9 PG — LOW (ref 27–31)
MCHC RBC-ENTMCNC: 35 G/DL — SIGNIFICANT CHANGE UP (ref 32–37)
MCV RBC AUTO: 76.8 FL — LOW (ref 80–94)
MONOCYTES # BLD AUTO: 0.89 K/UL — HIGH (ref 0.1–0.6)
MONOCYTES NFR BLD AUTO: 9.3 % — SIGNIFICANT CHANGE UP (ref 1.7–9.3)
NEUTROPHILS # BLD AUTO: 7.43 K/UL — HIGH (ref 1.4–6.5)
NEUTROPHILS NFR BLD AUTO: 77.7 % — HIGH (ref 42.2–75.2)
NRBC # BLD: 0 /100 WBCS — SIGNIFICANT CHANGE UP (ref 0–0)
PLATELET # BLD AUTO: 235 K/UL — SIGNIFICANT CHANGE UP (ref 130–400)
PMV BLD: 9.3 FL — SIGNIFICANT CHANGE UP (ref 7.4–10.4)
RBC # BLD: 5.39 M/UL — SIGNIFICANT CHANGE UP (ref 4.7–6.1)
RBC # FLD: 13.8 % — SIGNIFICANT CHANGE UP (ref 11.5–14.5)
WBC # BLD: 9.56 K/UL — SIGNIFICANT CHANGE UP (ref 4.8–10.8)
WBC # FLD AUTO: 9.56 K/UL — SIGNIFICANT CHANGE UP (ref 4.8–10.8)

## 2024-01-31 PROCEDURE — 99231 SBSQ HOSP IP/OBS SF/LOW 25: CPT

## 2024-01-31 RX ADMIN — OLANZAPINE 10 MILLIGRAM(S): 15 TABLET, FILM COATED ORAL at 20:19

## 2024-01-31 RX ADMIN — OLANZAPINE 5 MILLIGRAM(S): 15 TABLET, FILM COATED ORAL at 08:56

## 2024-01-31 NOTE — BH INPATIENT PSYCHIATRY PROGRESS NOTE - NSBHMETABOLIC_PSY_ALL_CORE_FT
BMI: BMI (kg/m2): 24.6 (01-14-24 @ 12:21)  HbA1c: A1C with Estimated Average Glucose Result: 5.1 % (01-15-24 @ 08:34)    Glucose: POCT Blood Glucose.: 128 mg/dL (01-13-24 @ 04:49)    BP: 112/77 (01-31-24 @ 09:05) (90/61 - 139/88)Vital Signs Last 24 Hrs  T(C): 37.2 (01-31-24 @ 09:05), Max: 37.9 (01-30-24 @ 15:39)  T(F): 98.9 (01-31-24 @ 09:05), Max: 100.2 (01-30-24 @ 15:39)  HR: 102 (01-31-24 @ 09:05) (70 - 104)  BP: 112/77 (01-31-24 @ 09:05) (90/61 - 127/91)  BP(mean): --  RR: 18 (01-31-24 @ 09:05) (16 - 18)  SpO2: 97% (01-30-24 @ 15:39) (97% - 97%)      Lipid Panel: Date/Time: 01-15-24 @ 08:34  Cholesterol, Serum: 110  LDL Cholesterol Calculated: 39  HDL Cholesterol, Serum: 40  Total Cholesterol/HDL Ration Measurement: --  Triglycerides, Serum: 154

## 2024-01-31 NOTE — BH INPATIENT PSYCHIATRY PROGRESS NOTE - NSBHFUPINTERVALHXFT_PSY_A_CORE
Pt seen and evaluated, chart reviewed. As per nursing report, pt afebrile this morning, hospitalist consult pending. On evaluation, pt presents alert, linear and AOx3, anxious and cooperative. He denies physical complaints. He reports he continues with AH, "same", although states now every 10 mins instead of "back to back" yesterday. He reports AH telling him to "kill myself". He denies intent or plan to harm self, is future-oriented and endorses hope of AH returning to baseline. Pt reports reasons to live include his family. Pt able to safety plan. He denies VH and paranoia. He denies SI/HI, intent and plan. Pt adherent to medications, denies negative side effects. In behavioral control. Pt visible on unit and encouraged groups/DBT.

## 2024-02-01 PROCEDURE — 99231 SBSQ HOSP IP/OBS SF/LOW 25: CPT

## 2024-02-01 RX ORDER — OLANZAPINE 15 MG/1
10 TABLET, FILM COATED ORAL
Refills: 0 | Status: DISCONTINUED | OUTPATIENT
Start: 2024-02-01 | End: 2024-02-07

## 2024-02-01 RX ADMIN — Medication 2 MILLIGRAM(S): at 17:01

## 2024-02-01 RX ADMIN — OLANZAPINE 10 MILLIGRAM(S): 15 TABLET, FILM COATED ORAL at 20:32

## 2024-02-01 RX ADMIN — OLANZAPINE 5 MILLIGRAM(S): 15 TABLET, FILM COATED ORAL at 08:24

## 2024-02-01 NOTE — BH INPATIENT PSYCHIATRY PROGRESS NOTE - NSBHMETABOLIC_PSY_ALL_CORE_FT
BMI: BMI (kg/m2): 24.6 (01-14-24 @ 12:21)  HbA1c: A1C with Estimated Average Glucose Result: 5.1 % (01-15-24 @ 08:34)    Glucose: POCT Blood Glucose.: 128 mg/dL (01-13-24 @ 04:49)    BP: 101/52 (02-01-24 @ 07:39) (90/61 - 127/91)Vital Signs Last 24 Hrs  T(C): 36.7 (02-01-24 @ 07:39), Max: 36.7 (01-31-24 @ 15:15)  T(F): 98.1 (02-01-24 @ 07:39), Max: 98.1 (02-01-24 @ 07:39)  HR: 73 (02-01-24 @ 07:39) (73 - 73)  BP: 101/52 (02-01-24 @ 07:39) (101/52 - 101/52)  BP(mean): --  RR: 18 (02-01-24 @ 07:39) (18 - 18)  SpO2: --      Lipid Panel: Date/Time: 01-15-24 @ 08:34  Cholesterol, Serum: 110  LDL Cholesterol Calculated: 39  HDL Cholesterol, Serum: 40  Total Cholesterol/HDL Ration Measurement: --  Triglycerides, Serum: 154

## 2024-02-01 NOTE — BH INPATIENT PSYCHIATRY PROGRESS NOTE - NSBHASSESSSUMMFT_PSY_ALL_CORE
43-year-old man,, single with no significant PMH and with PPH of schizoaffective disorder, substance use disorder (alcohol, cannabis, cocaine), substance induced psychosis, PTSD, multiple prior hospitalizations (most recently to [facility] in 11/2023), reported history of suicide attempts, prior legal history of drug-related charges, who was BIB self after trying to hang himself with a belt from command auditory hallucinations. Pt presented with depressed mood, anhedonia, and aborted suicide attempt via hanging self with a belt, in the context of financial stressors and feelings of hopelessness. The patient has history of multiple prior hospitalizations and ED visits, is a poor utilizer of outpatient services, and has history of substance abuse. Pt will likely benefit from IPP at this time.    On evaluation, pt presents alert, linear and AOx3, with improved focus and ADLs. Of note, pt was with intermittent fevers, negative NMS w/u, seen by ID and cleared with negative w/u. Haldol discontinued d/t concern of drug-induced etiology, and was with improvement in labs and VS. Pt reported continued AH telling him to harm self, denied intent and plan to harm self. Pt agreed to trial zyprexa; CBC to be trended, continues to improve. He is help-seeking and future-oriented, able to safety plan, has not been a behavioral concern. Of note, pt continues with intermittent fevers of unclear cause, pending hospitalist consult.     #Schizoaffective d/o  -start zyprexa 5 mg daily (1/26), titrate zyprexa 5 mg bid (1/27) --> titrate zyprexa 5 mg qAM/ 10 mg qHS (1/29) --> titrate zyprexa 10 mg bid (2/1)  -d/c haldol 10 mg bid and remeron 15 mg qhs d/t lab abnormalities    #Leukopenia/neutropenia, thrombocytopenia  -improved  -trend CBC with diff with initiation of zyprexa  -ID consult  -medicine consult     #H/o polysubstance use  -pt denies recent use  -UDS unable to be collected    #Agitation  -for agitation not amenable to verbal redirection, may give zyprexa 5 mg q6h prn with escalation to IM if pt is refusing PO and is an acute danger to self or/and others

## 2024-02-01 NOTE — BH INPATIENT PSYCHIATRY PROGRESS NOTE - NSBHFUPINTERVALHXFT_PSY_A_CORE
Pt seen and evaluated, chart reviewed. As per nursing report, no acute events overnight, has remained afebrile, no PRNs. On evaluation, pt presents alert, linear and AOx3. He reports he is "ok". States he is with some improvement in AH, states less intense/"loud". He reports AH telling him to "kill myself". He denies intent or plan to harm self, is future-oriented and endorses hope of AH returning to baseline. Pt reports reasons to live include his family. Pt able to safety plan. He denies VH and paranoia. He denies SI/HI, intent and plan. Pt adherent to medications, denies negative side effects. In behavioral control. Pt visible on unit and encouraged groups/DBT. Discussed labs/VS with hospitalist, no further w/u indicated at this time, pt with improving labs and has remained afebrile.

## 2024-02-01 NOTE — BH INPATIENT PSYCHIATRY PROGRESS NOTE - NSBHCHARTREVIEWVS_PSY_A_CORE FT
Vital Signs Last 24 Hrs  T(C): 36.7 (02-01-24 @ 07:39), Max: 36.7 (01-31-24 @ 15:15)  T(F): 98.1 (02-01-24 @ 07:39), Max: 98.1 (02-01-24 @ 07:39)  HR: 73 (02-01-24 @ 07:39) (73 - 73)  BP: 101/52 (02-01-24 @ 07:39) (101/52 - 101/52)  BP(mean): --  RR: 18 (02-01-24 @ 07:39) (18 - 18)  SpO2: --

## 2024-02-01 NOTE — BH INPATIENT PSYCHIATRY PROGRESS NOTE - CURRENT MEDICATION
MEDICATIONS  (STANDING):  OLANZapine 10 milliGRAM(s) Oral two times a day    MEDICATIONS  (PRN):  acetaminophen     Tablet .. 650 milliGRAM(s) Oral every 6 hours PRN Temp greater or equal to 38C (100.4F), Mild Pain (1 - 3), Moderate Pain (4 - 6)  aluminum hydroxide/magnesium hydroxide/simethicone Suspension 30 milliLiter(s) Oral every 6 hours PRN Dyspepsia  hydrOXYzine hydrochloride 50 milliGRAM(s) Oral every 6 hours PRN anxiety/insomnia  melatonin 5 milliGRAM(s) Oral at bedtime PRN Insomnia  nicotine  Polacrilex Gum 2 milliGRAM(s) Oral every 2 hours PRN smoking cessation  OLANZapine 5 milliGRAM(s) Oral every 8 hours PRN agitation

## 2024-02-02 PROCEDURE — 99231 SBSQ HOSP IP/OBS SF/LOW 25: CPT

## 2024-02-02 RX ADMIN — Medication 2 MILLIGRAM(S): at 17:39

## 2024-02-02 RX ADMIN — OLANZAPINE 10 MILLIGRAM(S): 15 TABLET, FILM COATED ORAL at 20:09

## 2024-02-02 RX ADMIN — OLANZAPINE 10 MILLIGRAM(S): 15 TABLET, FILM COATED ORAL at 08:27

## 2024-02-02 NOTE — BH INPATIENT PSYCHIATRY PROGRESS NOTE - NSBHASSESSSUMMFT_PSY_ALL_CORE
43-year-old man,, single with no significant PMH and with PPH of schizoaffective disorder, substance use disorder (alcohol, cannabis, cocaine), substance induced psychosis, PTSD, multiple prior hospitalizations (most recently to [facility] in 11/2023), reported history of suicide attempts, prior legal history of drug-related charges, who was BIB self after trying to hang himself with a belt from command auditory hallucinations. Pt presented with depressed mood, anhedonia, and aborted suicide attempt via hanging self with a belt, in the context of financial stressors and feelings of hopelessness. The patient has history of multiple prior hospitalizations and ED visits, is a poor utilizer of outpatient services, and has history of substance abuse. Pt will likely benefit from IPP at this time.    On evaluation, pt presents alert, linear and AOx3, with improved focus and ADLs. Of note, pt previously with intermittent fevers with , negative NMS w/u, seen by ID and cleared with negative w/u. Haldol discontinued d/t concern of drug-induced etiology, and was with improvement in labs and VS. Pt reported continued AH telling him to harm self, denied intent and plan to harm self. Pt agreed to trial zyprexa; CBC to be trended, continues to improve. He is help-seeking and future-oriented, able to safety plan, has not been a behavioral concern. Of note, pt continues with intermittent fevers of unclear cause, pending hospitalist consult.     #Schizoaffective d/o  -start zyprexa 5 mg daily (1/26), titrate zyprexa 5 mg bid (1/27) --> titrate zyprexa 5 mg qAM/ 10 mg qHS (1/29) --> titrate zyprexa 10 mg bid (2/1)  -d/c haldol 10 mg bid and remeron 15 mg qhs d/t lab abnormalities    #Leukopenia/neutropenia, thrombocytopenia  -improved  -trend CBC with diff with initiation of zyprexa  -ID consult  -medicine consult     #H/o polysubstance use  -pt denies recent use  -UDS unable to be collected    #Agitation  -for agitation not amenable to verbal redirection, may give zyprexa 5 mg q6h prn with escalation to IM if pt is refusing PO and is an acute danger to self or/and others  43-year-old man,, single with no significant PMH and with PPH of schizoaffective disorder, substance use disorder (alcohol, cannabis, cocaine), substance induced psychosis, PTSD, multiple prior hospitalizations (most recently to [facility] in 11/2023), reported history of suicide attempts, prior legal history of drug-related charges, who was BIB self after trying to hang himself with a belt from command auditory hallucinations. Pt presented with depressed mood, anhedonia, and aborted suicide attempt via hanging self with a belt, in the context of financial stressors and feelings of hopelessness. The patient has history of multiple prior hospitalizations and ED visits, is a poor utilizer of outpatient services, and has history of substance abuse. Pt will likely benefit from IPP at this time.    On evaluation, pt presents alert, linear and AOx3, with improved focus and ADLs. Of note, pt was previously with intermittent fevers, negative NMS w/u, seen by ID and cleared with negative w/u, likely combination of viral- and drug-induced etiology. Haldol discontinued. Pt reported continued AH telling him to harm self, denied intent and plan to harm self. Pt agreed to trial zyprexa; CBC trended with continued improvement. He is help-seeking and future-oriented, able to safety plan, has not been a behavioral concern.     #Schizoaffective d/o  -start zyprexa 5 mg daily (1/26), titrate zyprexa 5 mg bid (1/27) --> titrate zyprexa 5 mg qAM/ 10 mg qHS (1/29) --> titrate zyprexa 10 mg bid (2/1)  -d/c haldol 10 mg bid and remeron 15 mg qhs d/t lab abnormalities    #Leukopenia/neutropenia, thrombocytopenia  -improved  -trend CBC with diff with initiation of zyprexa  -ID consult  -medicine consult     #H/o polysubstance use  -pt denies recent use  -UDS unable to be collected    #Agitation  -for agitation not amenable to verbal redirection, may give zyprexa 5 mg q6h prn with escalation to IM if pt is refusing PO and is an acute danger to self or/and others

## 2024-02-02 NOTE — BH INPATIENT PSYCHIATRY PROGRESS NOTE - NSBHPROGMEDSE_PSY_A_CORE FT
leukopenia/neutropenia and thrombocytopenia, haldol discontinued, ID/medicine consults --> improved s/p haldol discontinuation; pt agreed to start of zyprexa, CBC to be trended leukopenia/neutropenia and thrombocytopenia, haldol discontinued, ID/medicine consults --> improved s/p haldol discontinuation; pt agreed to start of zyprexa, CBC to be trended, has remained improved, no further episodes of fever

## 2024-02-02 NOTE — BH INPATIENT PSYCHIATRY PROGRESS NOTE - NSBHFUPINTERVALHXFT_PSY_A_CORE
Pt seen and evaluated, chart reviewed. As per nursing report, no acute events overnight. On evaluation, pt presents alert, linear and AOx3, greets treatment team with a smile. He reports he is "ok". He endorses some improvement in AH, states less intense/"loud". He reports AH telling him to "kill myself". He denies intent or plan to harm self, is future-oriented and endorses hope of AH returning to baseline. Pt reports reasons to live include his family. Pt able to safety plan. He denies VH and paranoia. He denies SI/HI, intent and plan. Pt adherent to medications, denies negative side effects. In behavioral control. Pt visible on unit and in groups/DBT. In behavioral control.

## 2024-02-02 NOTE — BH INPATIENT PSYCHIATRY PROGRESS NOTE - NSBHMETABOLIC_PSY_ALL_CORE_FT
BMI: BMI (kg/m2): 24.6 (01-14-24 @ 12:21)  HbA1c: A1C with Estimated Average Glucose Result: 5.1 % (01-15-24 @ 08:34)    Glucose: POCT Blood Glucose.: 128 mg/dL (01-13-24 @ 04:49)    BP: 104/58 (02-02-24 @ 08:25) (90/61 - 127/91)Vital Signs Last 24 Hrs  T(C): 35.7 (02-02-24 @ 08:25), Max: 36 (02-01-24 @ 15:37)  T(F): 96.3 (02-02-24 @ 08:25), Max: 96.8 (02-01-24 @ 15:37)  HR: 68 (02-02-24 @ 08:25) (68 - 82)  BP: 104/58 (02-02-24 @ 08:25) (104/58 - 117/62)  BP(mean): --  RR: 18 (02-02-24 @ 08:25) (16 - 18)  SpO2: --      Lipid Panel: Date/Time: 01-15-24 @ 08:34  Cholesterol, Serum: 110  LDL Cholesterol Calculated: 39  HDL Cholesterol, Serum: 40  Total Cholesterol/HDL Ration Measurement: --  Triglycerides, Serum: 154

## 2024-02-02 NOTE — BH INPATIENT PSYCHIATRY PROGRESS NOTE - NSBHCHARTREVIEWVS_PSY_A_CORE FT
Vital Signs Last 24 Hrs  T(C): 35.7 (02-02-24 @ 08:25), Max: 36 (02-01-24 @ 15:37)  T(F): 96.3 (02-02-24 @ 08:25), Max: 96.8 (02-01-24 @ 15:37)  HR: 68 (02-02-24 @ 08:25) (68 - 82)  BP: 104/58 (02-02-24 @ 08:25) (104/58 - 117/62)  BP(mean): --  RR: 18 (02-02-24 @ 08:25) (16 - 18)  SpO2: --

## 2024-02-03 RX ADMIN — OLANZAPINE 10 MILLIGRAM(S): 15 TABLET, FILM COATED ORAL at 21:38

## 2024-02-03 RX ADMIN — Medication 50 MILLIGRAM(S): at 03:09

## 2024-02-03 RX ADMIN — OLANZAPINE 10 MILLIGRAM(S): 15 TABLET, FILM COATED ORAL at 08:32

## 2024-02-04 RX ADMIN — OLANZAPINE 10 MILLIGRAM(S): 15 TABLET, FILM COATED ORAL at 08:14

## 2024-02-04 RX ADMIN — OLANZAPINE 10 MILLIGRAM(S): 15 TABLET, FILM COATED ORAL at 20:38

## 2024-02-05 PROCEDURE — 99231 SBSQ HOSP IP/OBS SF/LOW 25: CPT

## 2024-02-05 RX ADMIN — OLANZAPINE 10 MILLIGRAM(S): 15 TABLET, FILM COATED ORAL at 08:29

## 2024-02-05 RX ADMIN — OLANZAPINE 10 MILLIGRAM(S): 15 TABLET, FILM COATED ORAL at 20:15

## 2024-02-05 NOTE — BH DISCHARGE NOTE NURSING/SOCIAL WORK/PSYCH REHAB - NSDCADDINFO2FT_PSY_ALL_CORE
THIS IS AN IN-PERSON APPOINTMENT. All APPOINTMENTS MUST BE ATTENDED IN ORDER TO SEE YOUR PSYCHIATRIST.

## 2024-02-05 NOTE — BH DISCHARGE NOTE NURSING/SOCIAL WORK/PSYCH REHAB - NSDCPEEMAIL_GEN_ALL_CORE
[facility] email [email or web address]

## 2024-02-05 NOTE — BH INPATIENT PSYCHIATRY PROGRESS NOTE - NSBHASSESSSUMMFT_PSY_ALL_CORE
43-year-old man,, single with no significant PMH and with PPH of schizoaffective disorder, substance use disorder (alcohol, cannabis, cocaine), substance induced psychosis, PTSD, multiple prior hospitalizations (most recently to [facility] in 11/2023), reported history of suicide attempts, prior legal history of drug-related charges, who was BIB self after trying to hang himself with a belt from command auditory hallucinations. Pt presented with depressed mood, anhedonia, and aborted suicide attempt via hanging self with a belt, in the context of financial stressors and feelings of hopelessness. The patient has history of multiple prior hospitalizations and ED visits, is a poor utilizer of outpatient services, and has history of substance abuse.     During treatment, pt was with intermittent fevers, negative NMS w/u, seen by ID and cleared with negative w/u, likely combination of viral- and drug-induced etiology. Haldol discontinued. Pt reported continued AH telling him to harm self, denied intent and plan to harm self. Pt agreed to trial zyprexa. CBC trended with resolution of leukopenia/neutropenia.     On evaluation, pt presents pleasant and cooperative with brighter affect than prior evaluations. He reports improved AH, mood and sleep. He denies SI/I/P, is help-seeking and future-oriented. He is adherent to medications and has not been a behavioral concern.     #Schizoaffective d/o  -start zyprexa 5 mg daily (1/26), titrate zyprexa 5 mg bid (1/27) --> titrate zyprexa 5 mg qAM/ 10 mg qHS (1/29) --> titrate zyprexa 10 mg bid (2/1)  -d/c haldol 10 mg bid and remeron 15 mg qhs d/t lab abnormalities    #Leukopenia/neutropenia, thrombocytopenia  -improved  -trend CBC with diff with initiation of zyprexa  -ID consult  -medicine consult     #H/o polysubstance use  -pt denies recent use  -UDS unable to be collected    #Agitation  -for agitation not amenable to verbal redirection, may give zyprexa 5 mg q6h prn with escalation to IM if pt is refusing PO and is an acute danger to self or/and others  43-year-old man,, single with no significant PMH and with PPH of schizoaffective disorder, substance use disorder (alcohol, cannabis, cocaine), substance induced psychosis, PTSD, multiple prior hospitalizations (most recently to [facility] in 11/2023), reported history of suicide attempts, prior legal history of drug-related charges, who was BIB self after trying to hang himself with a belt from command auditory hallucinations. Pt presented with depressed mood, anhedonia, and aborted suicide attempt via hanging self with a belt, in the context of financial stressors and feelings of hopelessness. The patient has history of multiple prior hospitalizations and ED visits, is a poor utilizer of outpatient services, and has history of substance abuse.     During treatment, pt was with intermittent fevers, negative NMS w/u, seen by ID and cleared with negative w/u, likely combination of viral- and drug-induced etiology. Haldol discontinued. Pt reported continued AH telling him to harm self, denied intent and plan to harm self. Pt agreed to trial zyprexa. CBC trended with resolution of leukopenia/neutropenia.     On evaluation, pt presents pleasant and cooperative with brighter affect than prior evaluations. He reports improving AH, mood and sleep. He denies SI/I/P, is help-seeking and future-oriented. He is adherent to medications and has not been a behavioral concern.     #Schizoaffective d/o  -start zyprexa 5 mg daily (1/26), titrate zyprexa 5 mg bid (1/27) --> titrate zyprexa 5 mg qAM/ 10 mg qHS (1/29) --> titrate zyprexa 10 mg bid (2/1)  -d/c haldol 10 mg bid and remeron 15 mg qhs d/t lab abnormalities    #Leukopenia/neutropenia, thrombocytopenia  -improved  -trend CBC with diff with initiation of zyprexa  -ID consult  -medicine consult     #H/o polysubstance use  -pt denies recent use  -UDS unable to be collected    #Agitation  -for agitation not amenable to verbal redirection, may give zyprexa 5 mg q6h prn with escalation to IM if pt is refusing PO and is an acute danger to self or/and others

## 2024-02-05 NOTE — BH INPATIENT PSYCHIATRY PROGRESS NOTE - NSBHCHARTREVIEWVS_PSY_A_CORE FT
Vital Signs Last 24 Hrs  T(C): 35.7 (02-05-24 @ 08:42), Max: 35.7 (02-05-24 @ 08:42)  T(F): 96.2 (02-05-24 @ 08:42), Max: 96.2 (02-05-24 @ 08:42)  HR: --  BP: 128/95 (02-05-24 @ 08:42) (128/95 - 128/95)  BP(mean): --  RR: 18 (02-05-24 @ 08:42) (18 - 18)  SpO2: --

## 2024-02-05 NOTE — BH DISCHARGE NOTE NURSING/SOCIAL WORK/PSYCH REHAB - NSBHDCAGENCY2FT_PSY_A_CORE
[facility] Outpatient MH Services  [facility] Outpatient MH Services   IOP Group 2/8/24 11am - 12pm with Martinez IN PERSON  [facility] Evaluation Unit

## 2024-02-05 NOTE — BH DISCHARGE NOTE NURSING/SOCIAL WORK/PSYCH REHAB - NSBHDCAGENCY1FT_PSY_A_CORE
[facility] Outpatient MH Services  [facility] Outpatient MH Services   IOP Intake 2/8/24 10am with Martinez IN PERSON  BRWENDI Hernandez

## 2024-02-05 NOTE — BH DISCHARGE NOTE NURSING/SOCIAL WORK/PSYCH REHAB - NSDCPEWEB_GEN_ALL_CORE
[facility] for Tobacco Control website --- [email or web address] website --- [email or web address]

## 2024-02-05 NOTE — BH INPATIENT PSYCHIATRY PROGRESS NOTE - NSBHADMITMEDEDUDETAILS_A_CORE FT
Reeducated risks and benefits of current medication regimen. 
Educated on indication, risks and benefits, and side effects profile of zyprexa. Emphasized need for CBC trend to monitor for neutropenia/agranulocytosis. Pt verbalized understanding. 
Reeducated risks and benefits of current medication regimen. 
Educated on indication, risks and benefits, and side effects profile of zyprexa. Emphasized need for CBC trend to monitor for neutropenia/agranulocytosis. Pt verbalized understanding. 
Educated on indication, risks and benefits, and side effects profile of zyprexa. Emphasized need for CBC trend to monitor for neutropenia/agranulocytosis. Pt verbalized understanding. 
Reeducated risks and benefits of current medication regimen. 
Educated on indication, risks and benefits, and side effects profile of zyprexa. Emphasized need for CBC trend to monitor for neutropenia/agranulocytosis. Pt verbalized understanding. 

## 2024-02-05 NOTE — BH DISCHARGE NOTE NURSING/SOCIAL WORK/PSYCH REHAB - NSDCADDINFO1FT_PSY_ALL_CORE
THIS IS AN IN-PERSON APPOINTMENT. PLEASE ARRIVE 30 MINUTES PRIOR TO YOUR APPOINTMENT*YOU MUST ATTEND THE INITIAL INTAKE APPOINTMENT IN ORDER TO SEE YOUR PSYCHIATRIST. A MISSED INTAKE WILL CANCEL PSYCHIATRIST'S APPT AUTOMATICALLY! *

## 2024-02-05 NOTE — BH INPATIENT PSYCHIATRY PROGRESS NOTE - NSBHFUPINTERVALHXFT_PSY_A_CORE
Pt seen and evaluated, chart reviewed. As per nursing report, no acute events overnight, no PRNs. On evaluation, pt presents pleasant and cooperative, greets treatment team with a smile. He reports he is doing "much better". He reports improvement in AH, states less frequent and loud, more easily ignorable with resolution of CAH. He reports improved mood, states he is also sleeping and eating well. He denies VH, paranoia. Denies passive and active SI/HI, intent and plan. He is adherent to medications, denies negative side effects. Pt more visible on unit and in behavioral control.  Pt seen and evaluated, chart reviewed. As per nursing report, no acute events overnight, no PRNs. On evaluation, pt presents pleasant and cooperative, greets treatment team with a smile. He reports he is doing "much better". He reports improvement in AH, states less frequent and loud, more easily ignorable. He denies CAH. He reports improving mood, states he is also sleeping and eating better. He denies VH, paranoia. Denies SI/HI, intent and plan. He is adherent to medications, denies negative side effects. Pt more visible on unit and in behavioral control.

## 2024-02-05 NOTE — BH DISCHARGE NOTE NURSING/SOCIAL WORK/PSYCH REHAB - NSCDUDCCRISIS_PSY_A_CORE
[facility]  1 [phone] [facility] [phone])  Text "WELL" to 04333  Website: www."[employer]/.National Suicide Prevention Lifeline 6 (067) 634-5901/.  [employer]  1 (149) LIFENET (900-5846)/988 Suicide and Crisis Lifeline

## 2024-02-05 NOTE — BH INPATIENT PSYCHIATRY PROGRESS NOTE - NSBHPROGMEDSE_PSY_A_CORE FT
leukopenia/neutropenia and thrombocytopenia, haldol discontinued, ID/medicine consults --> improved s/p haldol discontinuation; pt agreed to start of zyprexa, CBC to be trended, has remained improved, no further episodes of fever

## 2024-02-05 NOTE — BH DISCHARGE NOTE NURSING/SOCIAL WORK/PSYCH REHAB - NSBHDCAGENCY4FT_PSY_A_CORE
[facility] Outpatient MH Services   Evaluation 2/14/24 12pm with Dr. Herrera IN PERSON

## 2024-02-05 NOTE — BH DISCHARGE NOTE NURSING/SOCIAL WORK/PSYCH REHAB - PATIENT PORTAL LINK FT
You can access the FollowMyHealth Patient Portal offered by [facility] by registering at the following website: [email or web address]. By joining [facility]’s FollowMyHealth portal, you will also be able to view your health information using other applications (apps) compatible with our system.

## 2024-02-05 NOTE — BH INPATIENT PSYCHIATRY PROGRESS NOTE - NSBHMETABOLIC_PSY_ALL_CORE_FT
BMI: BMI (kg/m2): 24.6 (01-14-24 @ 12:21)  HbA1c: A1C with Estimated Average Glucose Result: 5.1 % (01-15-24 @ 08:34)    Glucose: POCT Blood Glucose.: 128 mg/dL (01-13-24 @ 04:49)    BP: 128/95 (02-05-24 @ 08:42) (96/51 - 128/95)Vital Signs Last 24 Hrs  T(C): 35.7 (02-05-24 @ 08:42), Max: 35.7 (02-05-24 @ 08:42)  T(F): 96.2 (02-05-24 @ 08:42), Max: 96.2 (02-05-24 @ 08:42)  HR: --  BP: 128/95 (02-05-24 @ 08:42) (128/95 - 128/95)  BP(mean): --  RR: 18 (02-05-24 @ 08:42) (18 - 18)  SpO2: --      Lipid Panel: Date/Time: 01-15-24 @ 08:34  Cholesterol, Serum: 110  LDL Cholesterol Calculated: 39  HDL Cholesterol, Serum: 40  Total Cholesterol/HDL Ration Measurement: --  Triglycerides, Serum: 154

## 2024-02-06 PROCEDURE — 99231 SBSQ HOSP IP/OBS SF/LOW 25: CPT

## 2024-02-06 RX ORDER — OLANZAPINE 15 MG/1
1 TABLET, FILM COATED ORAL
Qty: 60 | Refills: 0
Start: 2024-02-06 | End: 2024-03-06

## 2024-02-06 RX ORDER — NICOTINE POLACRILEX 2 MG
1 GUM BUCCAL
Qty: 1 | Refills: 0
Start: 2024-02-06 | End: 2024-03-06

## 2024-02-06 RX ADMIN — OLANZAPINE 10 MILLIGRAM(S): 15 TABLET, FILM COATED ORAL at 08:19

## 2024-02-06 RX ADMIN — OLANZAPINE 10 MILLIGRAM(S): 15 TABLET, FILM COATED ORAL at 20:16

## 2024-02-06 NOTE — BH TREATMENT PLAN - NSDCCRITERIA_PSY_ALL_CORE
When pt is no longer an acute or imminent risk of harm to self or/and others, and is able to care for self safely, pt may then be discharged

## 2024-02-06 NOTE — BH TREATMENT PLAN - NSTXDISORGINTERRN_PSY_ALL_CORE
RN to assess patients behavior and be alert for increased signs of impulsivity/agitation.  RN to redirect patient and provide low stimulating and calming activites  RN to encourage medication compliance and provide support and education as needed on Dx, coping skills, medication, and safety planning.  RN to Encourage daily ADL's  RN to Encourage group attendance  RN will assess and intervene for any disorganized behavior

## 2024-02-06 NOTE — BH INPATIENT PSYCHIATRY DISCHARGE NOTE - NSBHMETABOLIC_PSY_ALL_CORE_FT
BMI: BMI (kg/m2): 24.6 (01-14-24 @ 12:21)  HbA1c: A1C with Estimated Average Glucose Result: 5.1 % (01-15-24 @ 08:34)    Glucose: POCT Blood Glucose.: 128 mg/dL (01-13-24 @ 04:49)    BP: 112/59 (02-06-24 @ 08:31) (96/51 - 128/95)Vital Signs Last 24 Hrs  T(C): 36.1 (02-06-24 @ 08:31), Max: 36.1 (02-06-24 @ 08:31)  T(F): 97 (02-06-24 @ 08:31), Max: 97 (02-06-24 @ 08:31)  HR: 84 (02-06-24 @ 08:31) (84 - 103)  BP: 112/59 (02-06-24 @ 08:31) (107/53 - 112/59)  BP(mean): --  RR: 18 (02-06-24 @ 08:31) (18 - 18)  SpO2: --      Lipid Panel: Date/Time: 01-15-24 @ 08:34  Cholesterol, Serum: 110  LDL Cholesterol Calculated: 39  HDL Cholesterol, Serum: 40  Total Cholesterol/HDL Ration Measurement: --  Triglycerides, Serum: 154

## 2024-02-06 NOTE — BH TREATMENT PLAN - NSTXPSYCHOGOAL_PSY_ALL_CORE
Will report hearing a voice only momentarily a few times a day instead of all day
Will be able to report experiencing hallucinations to staff

## 2024-02-06 NOTE — BH INPATIENT PSYCHIATRY DISCHARGE NOTE - NSBHDCMEDICALFT_PSY_A_CORE
#Leukopenia/neutropenia, thrombocytopenia  -improved s/p discontinuation of haldol and remeron  -ID consult --> "Likely combination of COVID and drug-related", infectious work up unremarkable  -medicine consult   -OP f/u

## 2024-02-06 NOTE — BH INPATIENT PSYCHIATRY DISCHARGE NOTE - LEGAL HISTORY
hx of arrest for fighting, drug charges.  Reports spending 1.5 years in senior care for drug charges.

## 2024-02-06 NOTE — BH TREATMENT PLAN - NSTXPSYCHOINTERMD_PSY_ALL_CORE
Medications management, reality reorientation, psychotherapy

## 2024-02-06 NOTE — BH TREATMENT PLAN - NSTXDISORGINTERPR_PSY_ALL_CORE
RT will offer support and encouragement
RT will offer support and encouragement
Rt will offer support and encouragement .

## 2024-02-06 NOTE — BH INPATIENT PSYCHIATRY DISCHARGE NOTE - NSDCMRMEDTOKEN_GEN_ALL_CORE_FT
nicotine 2 mg oral transmucosal gum: 1 gum chewed 4 times a day x 30 days as needed for  smoking cessation Continue to take as prescribed until told otherwise by your provider  OLANZapine 10 mg oral tablet: 1 tab(s) orally 2 times a day x 30 days Continue to take as prescribed until told otherwise by your provider

## 2024-02-06 NOTE — BH TREATMENT PLAN - NSTXDEPRESGOAL_PSY_ALL_CORE
Exhibit improvements in self-grooming, hygiene, sleep and appetite
Will identify 2 coping skills that assist in improving mood

## 2024-02-06 NOTE — BH TREATMENT PLAN - NSCMSPTSTRENGTHS_PSY_ALL_CORE
Compliance to treatment/Expressive of emotions/Future/goal oriented/Highly motivated for treatment/Motivated/Resourceful
Compliance to treatment/Expressive of emotions/Future/goal oriented/Highly motivated for treatment/Resourceful
Compliance to treatment/Expressive of emotions/Future/goal oriented/Highly motivated for treatment/Motivated/Resourceful
Compliance to treatment/Future/goal oriented/Highly motivated for treatment/Motivated/Resourceful

## 2024-02-06 NOTE — BH INPATIENT PSYCHIATRY DISCHARGE NOTE - NSBHMSEATTEN_PSY_A_CORE
Pt well appearing. Pt given ectopic precautions and aware to fu in 48 hours for repeat HCG with either OBGYN or return to er. Discussed results and outcome of testing with the patient.  Patient given copy of available results. Patient advised to please follow up with their PMD within the next 24 hours and return to the Emergency Department for worsening symptoms or any other concerns.
Normal

## 2024-02-06 NOTE — BH TREATMENT PLAN - NSTXDISORGGOAL_PSY_ALL_CORE
Will identify 2 coping skills that assist in organizing
Will demonstrate purposeful and predictable thoughts/behaviors by making a request

## 2024-02-06 NOTE — BH INPATIENT PSYCHIATRY DISCHARGE NOTE - NSBHFUPINTERVALHXFT_PSY_A_CORE
Pt seen and evaluated, chart reviewed. As per nursing report, no acute events overnight, no PRNs. On evaluation, pt presents pleasant and cooperative with brighter affect. He reports he is doing well and denies any new complaints. He reports resolution of AH, endorses relief. He endorses overall improved mood, sleep and appetite since admission. He denies VH, paranoia. Denies passive and active SI/HI, intent and plan. He is future-oriented with improved insight, verbalizes beenfits of adhering to medications and agrees to OP f/u. Pt reports goal to f/u with his social security benefits. He adherent to medications, denies negative side effects. Pt more visible on unit and in behavioral control. Anticipated discharge for tomorrow.

## 2024-02-06 NOTE — BH TREATMENT PLAN - NSTXSUICIDINTERSW_PSY_ALL_CORE
SW will continue to provide support contacts, education and referrals for healthy coping skills. SW will encourage patients to be visible on the unit and participate in groups.

## 2024-02-06 NOTE — BH INPATIENT PSYCHIATRY DISCHARGE NOTE - OTHER PAST PSYCHIATRIC HISTORY (INCLUDE DETAILS REGARDING ONSET, COURSE OF ILLNESS, INPATIENT/OUTPATIENT TREATMENT)
Diagnoses: schizoaffective disorder, r/o depression, r/o schizophrenia, substance-induced mood/psychotic disorder, anxiety     Inpatient: 20+ hospitalizations, first in 2008     Outpatient: [facility], last saw psychiatrist 2 months ago     SA: today tried hang self with belt, 6 months ago tried to hang self     NSSI: none

## 2024-02-06 NOTE — BH INPATIENT PSYCHIATRY DISCHARGE NOTE - DESCRIPTION
lives with mother in apartment, Born [place] and raised by Maternal uncle. He reports that he has no siblings.  He reports completing an 11th grade education. He supports himself with SSI.  He reports that he has one 18-year-old son with whom he has no contact.  He identifies as Taoist.

## 2024-02-06 NOTE — BH INPATIENT PSYCHIATRY DISCHARGE NOTE - HPI (INCLUDE ILLNESS QUALITY, SEVERITY, DURATION, TIMING, CONTEXT, MODIFYING FACTORS, ASSOCIATED SIGNS AND SYMPTOMS)
suicidal ideation  "I've been very depressed"  The patient is a 43-year-old man,, single with no significant PMH and with PPH of schizoaffective disorder, substance use disorder (alcohol, cannabis, cocaine), substance induced psychosis, PTSD, multiple prior hospitalizations (most recently to [facility] in 11/2023), reported history of suicide attempts, prior legal history of drug-related charges, who was BIB self after trying to hang himself with a belt from command auditory hallucinations.      The patient presents calm, cooperative, dysphoric-appearing, linear, answering questions appropriately.     The patient states that for the past several days, he has been feeling increasingly depressed. He has not been eating as much, isolating himself from others. He cites financial issues and not having a job and not having support from others as big stressors in his life. On the day of presentation, he was having command auditory hallucinations telling him to kill himself and therefore he proceeded to grab a belt and wrapped it around his neck to try and hang himself. His mother was there and stopped him from going through with it. She told him that he should tell her when he’s not feeling well instead of going through with it. He decided to then bring himself into the ED for help. He currently denies SI, plan, or intent but is worried about what would happen if he were to leave the hospital. He denies any HI.     The patient takes Haldol 10mg daily as his only medication. He last saw his outpatient psychiatrist at [facility] 2 months ago, has not had subsequent visits.     The patient was most recently admitted psychiatrically in 11/2023 to [facility] for similar presentation.         \on 01/14/24. chart reviewed  , discussed with staff and patient evaluated with staff. is COVID positive.  patient was cooperative. taking his medications.   reports that  he hears voices  telling to  hang himself.   denies  active  suicidal ideations intent or plan.  He reports that he feels safe  and needs help.   reports feeling depress.

## 2024-02-06 NOTE — BH TREATMENT PLAN - NSTXSUICIDINTERMD_PSY_ALL_CORE
Medications management, encourage groups/DBT, suicide precautions, enhanced supervision

## 2024-02-06 NOTE — BH INPATIENT PSYCHIATRY PROGRESS NOTE - NSBHPROGMEDSE_PSY_A_CORE FT
leukopenia/neutropenia and thrombocytopenia, haldol discontinued, ID/medicine consults --> improved s/p haldol discontinuation; pt agreed to start of zyprexa, CBC has remained improved with no further episodes of fever

## 2024-02-06 NOTE — BH TREATMENT PLAN - NSTXSUICIDGOAL_PSY_ALL_CORE
Talk to staff and ask for assistance when having suicidal wishes instead of acting out
Be able to state 3 reasons for living
Be able to state 3 reasons for living
Talk to staff and ask for assistance when having suicidal wishes instead of acting out

## 2024-02-06 NOTE — BH TREATMENT PLAN - NSTXDEPRESINTERMD_PSY_ALL_CORE
Medications managements, encourage groups/DBT

## 2024-02-06 NOTE — BH TREATMENT PLAN - NSTXPSYCHOINTERRN_PSY_ALL_CORE
RN will assess for command auditory hallucination  RN will provide daily medication regimen  RN will offer PRN medication for worsening hallucinations  RN will ensure the enviroment is safe  RN will educate on coping skills/ encourage distractions to help manage auditory hallucination
RN to assess patients behavior and be alert for increased signs of impulsivity/agitation.  RN to redirect patient and provide low stimulating and calming activites  RN to encourage medication compliance and provide support and education as needed on Dx, coping skills, medication, and safety planning.  RN to Encourage daily ADL's  RN to Encourage group attendance  RN will assess and intervene for any disorganized behavior
RN will assess for command auditory hallucination  RN will provide daily medication regimen  RN will offer PRN medication for worsening hallucinations  RN will ensure the enviroment is safe  RN will educate on coping skills/ encourage distractions to help manage auditory hallucination

## 2024-02-06 NOTE — BH INPATIENT PSYCHIATRY DISCHARGE NOTE - HOSPITAL COURSE
Pt has made significant progress over the course of hospitalization. With continuous psychotherapy from the treatment team and the medications, he reports feeling better, sleeping and eating well. Medications adjusted as follows- initially restarted on haldol, titrated haldol 10 mg bid for psychosis, and remeron 15 mg qhs for insomnia and anxiety; however pt with leukopenia/neutropenia, which resolved s/p discontinuation of medications. Pt agreed to trial zyprexa with CBC trend, titrated zyprexa 10 mg bid. Pt tolerated medication well, denied negative side effects. Thought process and insight improved. Pt was calm and cooperative and was in good behavioral control. Denied any suicidal or homicidal ideations. Denied any auditory or visual hallucinations. Pt with good ADLs.  Pt able to vocalize and utilize prosocial behaviors to address needs, and engage appropriately with staff and group milieu. Abnormalities in mental status improved sufficiently to permit outgoing care in the outpatient setting. Pt was evaluated by treatment team, pt is stable for discharge and shows no imminent danger to self, others or property at this time. He understands and agrees with treatment plan and following up with outpatient. Psychoeducation provided regarding diagnosis, medications, treatment and follow up. Risks, benefits, alternatives discussed, all questions and concerns addressed and answered. Reviewed crisis intervention with verbalized understanding.

## 2024-02-06 NOTE — BH INPATIENT PSYCHIATRY DISCHARGE NOTE - NSBHASSESSSUMMFT_PSY_ALL_CORE
43-year-old man,, single with no significant PMH and with PPH of schizoaffective disorder, substance use disorder (alcohol, cannabis, cocaine), substance induced psychosis, PTSD, multiple prior hospitalizations (most recently to [facility] in 11/2023), reported history of suicide attempts, prior legal history of drug-related charges, who was BIB self after trying to hang himself with a belt from command auditory hallucinations. Pt presented with depressed mood, anhedonia, and aborted suicide attempt via hanging self with a belt, in the context of financial stressors and feelings of hopelessness. The patient has history of multiple prior hospitalizations and ED visits, is a poor utilizer of outpatient services, and has history of substance abuse.     During treatment, pt was with intermittent fevers, negative NMS w/u, seen by ID and cleared with negative w/u, likely combination of viral- and drug-induced etiology. Haldol discontinued. Pt reported continued AH telling him to harm self, denied intent and plan to harm self. Pt agreed to trial zyprexa. CBC trended with resolution of leukopenia/neutropenia.     On evaluation, pt presents pleasant and cooperative with brighter affect than prior evaluations. He reports resolution of AH. Denies VH, paranoia. Endorses overall improved mood, sleep and appetite since admission. He denies passive and active SI/HI/I/P, appears future-oriented with improved insight. Pt is agreeable to OP f/u. He is adherent to medications and has not been a behavioral concern. Anticipated discharge for tomorrow.     #Schizoaffective d/o  -start zyprexa 5 mg daily (1/26), titrate zyprexa 5 mg bid (1/27) --> titrate zyprexa 5 mg qAM/ 10 mg qHS (1/29) --> titrate zyprexa 10 mg bid (2/1)  -d/c haldol 10 mg bid and remeron 15 mg qhs d/t lab abnormalities    #Leukopenia/neutropenia, thrombocytopenia  -improved  -ID consult  -medicine consult     #H/o polysubstance use  -pt denies recent use  -UDS unable to be collected    #Agitation  -for agitation not amenable to verbal redirection, may give zyprexa 5 mg q6h prn with escalation to IM if pt is refusing PO and is an acute danger to self or/and others

## 2024-02-06 NOTE — BH INPATIENT PSYCHIATRY PROGRESS NOTE - ATTENDING COMMENTS
I have reviewed case with PNP, made any necessary revisions and concur with findings and plans. 

## 2024-02-06 NOTE — BH TREATMENT PLAN - NSTXPLANTHERAPYSESSIONSFT_PSY_ALL_CORE
02-01-24  Type of therapy: Coping skills, Psychoeducation  Type of session: Group  Level of patient participation: Participated with encouragement  Duration of participation: 45 minutes  Therapy conducted by: Social work  Therapy Summary: SWI facilitated effective coping strategies are a critical aspect of managing stress. These strategies will enable individuals to identify and respond to stressors in a healthy manner, which in turn reduces the negative effects of stress on their overall well-being.      Lauren participated during group with encouragement. Patient appeared attentive to topic discussed and was able to share some of his coping mechanisms. Patient stated he enjoys eating healthy and avoiding big crowds to help him reduce stress. Patient appeared to be in stable mood through the group session.    02-01-24  Type of therapy: Coping skills, Mindfulness, Psychoeducation  Type of session: Group  --  --  --  Therapy Summary: Patient was encouraged to participate in group but declined.    02-06-24  Type of therapy: Coping skills, Inspiration and motiviation, Leisure development, Social skills training, Stress management, Symptom management  Type of session: Individual  Level of patient participation: Quiet, Resistance to participation  Duration of participation: 15 minutes  Therapy conducted by: Psych rehab  Therapy Summary: Pt will require ongoing support and encouragement .  
  01-19-24  Type of therapy: Coping skills, Inspiration and motiviation, Leisure development, Stress management, Symptom management  Type of session: Individual  Level of patient participation: Resistance to participation  Duration of participation: Less than 15 minutes  Therapy conducted by: Psych rehab  Therapy Summary: Pt will need increased encouragement    01-23-24  Type of therapy: Coping skills, Creative arts therapy, Inspiration and motiviation, Leisure development, Self esteem, Social skills training, Stress management, Symptom management  Type of session: Individual  Level of patient participation: Participated with encouragement  Duration of participation: Less than 15 minutes  Therapy conducted by: Psych rehab  Therapy Summary: Pt needs ongoing encouragement .

## 2024-02-06 NOTE — BH INPATIENT PSYCHIATRY PROGRESS NOTE - NSBHCHARTREVIEWVS_PSY_A_CORE FT
Vital Signs Last 24 Hrs  T(C): 36.1 (02-06-24 @ 08:31), Max: 36.1 (02-06-24 @ 08:31)  T(F): 97 (02-06-24 @ 08:31), Max: 97 (02-06-24 @ 08:31)  HR: 84 (02-06-24 @ 08:31) (84 - 103)  BP: 112/59 (02-06-24 @ 08:31) (107/53 - 112/59)  BP(mean): --  RR: 18 (02-06-24 @ 08:31) (18 - 18)  SpO2: --

## 2024-02-06 NOTE — BH INPATIENT PSYCHIATRY PROGRESS NOTE - NSBHASSESSSUMMFT_PSY_ALL_CORE
43-year-old man,, single with no significant PMH and with PPH of schizoaffective disorder, substance use disorder (alcohol, cannabis, cocaine), substance induced psychosis, PTSD, multiple prior hospitalizations (most recently to [facility] in 11/2023), reported history of suicide attempts, prior legal history of drug-related charges, who was BIB self after trying to hang himself with a belt from command auditory hallucinations. Pt presented with depressed mood, anhedonia, and aborted suicide attempt via hanging self with a belt, in the context of financial stressors and feelings of hopelessness. The patient has history of multiple prior hospitalizations and ED visits, is a poor utilizer of outpatient services, and has history of substance abuse.     During treatment, pt was with intermittent fevers, negative NMS w/u, seen by ID and cleared with negative w/u, likely combination of viral- and drug-induced etiology. Haldol discontinued. Pt reported continued AH telling him to harm self, denied intent and plan to harm self. Pt agreed to trial zyprexa. CBC trended with resolution of leukopenia/neutropenia.     On evaluation, pt presents pleasant and cooperative with brighter affect than prior evaluations. He reports resolution of AH. Denies VH, paranoia. Endorses overall improved mood, sleep and appetite since admission. He denies passive and active SI/HI/I/P, appears future-oriented with improved insight. Pt is agreeable to OP f/u. He is adherent to medications and has not been a behavioral concern. Anticipated discharge for tomorrow.     #Schizoaffective d/o  -start zyprexa 5 mg daily (1/26), titrate zyprexa 5 mg bid (1/27) --> titrate zyprexa 5 mg qAM/ 10 mg qHS (1/29) --> titrate zyprexa 10 mg bid (2/1)  -d/c haldol 10 mg bid and remeron 15 mg qhs d/t lab abnormalities    #Leukopenia/neutropenia, thrombocytopenia  -improved  -ID consult  -medicine consult     #H/o polysubstance use  -pt denies recent use  -UDS unable to be collected    #Agitation  -for agitation not amenable to verbal redirection, may give zyprexa 5 mg q6h prn with escalation to IM if pt is refusing PO and is an acute danger to self or/and others

## 2024-02-07 VITALS
DIASTOLIC BLOOD PRESSURE: 63 MMHG | TEMPERATURE: 96 F | RESPIRATION RATE: 16 BRPM | SYSTOLIC BLOOD PRESSURE: 104 MMHG | HEART RATE: 72 BPM

## 2024-02-07 PROCEDURE — 99238 HOSP IP/OBS DSCHRG MGMT 30/<: CPT

## 2024-02-07 RX ADMIN — OLANZAPINE 10 MILLIGRAM(S): 15 TABLET, FILM COATED ORAL at 09:00

## 2024-02-07 NOTE — BH INPATIENT PSYCHIATRY PROGRESS NOTE - NSBHMSEAFFQUAL_PSY_A_CORE
Depressed
Euthymic
Depressed
Depressed
Anxious
Euthymic/Anxious
Euthymic/Anxious
Anxious
Euthymic
Anxious
Euthymic/Anxious
Anxious
Euthymic
Euthymic
Depressed

## 2024-02-07 NOTE — BH INPATIENT PSYCHIATRY PROGRESS NOTE - NSTXSUICIDINTERMD_PSY_ALL_CORE
Medications management, encourage groups/DBT, suicide precautions, enhanced supervision

## 2024-02-07 NOTE — BH INPATIENT PSYCHIATRY PROGRESS NOTE - NSBHASSESSSUMMFT_PSY_ALL_CORE
43-year-old man,, single with no significant PMH and with PPH of schizoaffective disorder, substance use disorder (alcohol, cannabis, cocaine), substance induced psychosis, PTSD, multiple prior hospitalizations (most recently to [facility] in 11/2023), reported history of suicide attempts, prior legal history of drug-related charges, who was BIB self after trying to hang himself with a belt from command auditory hallucinations. Pt presented with depressed mood, anhedonia, and aborted suicide attempt via hanging self with a belt, in the context of financial stressors and feelings of hopelessness. The patient has history of multiple prior hospitalizations and ED visits, is a poor utilizer of outpatient services, and has history of substance abuse.     During treatment, pt was with intermittent fevers, negative NMS w/u, seen by ID and cleared with negative w/u, likely combination of viral- and drug-induced etiology. Haldol discontinued. Pt reported continued AH telling him to harm self, denied intent and plan to harm self. Pt agreed to trial zyprexa. CBC trended with resolution of leukopenia/neutropenia.     On evaluation, pt presents pleasant and cooperative with bright affect. He reports resolution of AH. Denies VH, paranoia. Endorses overall improved mood, sleep and appetite since admission. He denies passive and active SI/HI/I/P, appears future-oriented with improved insight. Pt is agreeable to OP f/u. He is adherent to medications and has not been a behavioral concern. Discharge today.    #Schizoaffective d/o  -start zyprexa 5 mg daily (1/26), titrate zyprexa 5 mg bid (1/27) --> titrate zyprexa 5 mg qAM/ 10 mg qHS (1/29) --> titrate zyprexa 10 mg bid (2/1)  -d/c haldol 10 mg bid and remeron 15 mg qhs d/t lab abnormalities    #Leukopenia/neutropenia, thrombocytopenia  -improved  -ID consult  -medicine consult     #H/o polysubstance use  -pt denies recent use  -UDS unable to be collected    #Agitation  -for agitation not amenable to verbal redirection, may give zyprexa 5 mg q6h prn with escalation to IM if pt is refusing PO and is an acute danger to self or/and others

## 2024-02-07 NOTE — BH INPATIENT PSYCHIATRY PROGRESS NOTE - NSBHMSEPERCEPT_PSY_A_CORE
Auditory hallucinations
Auditory hallucinations/Visual hallucinations
Auditory hallucinations/Visual hallucinations
No abnormalities
Auditory hallucinations

## 2024-02-07 NOTE — BH INPATIENT PSYCHIATRY PROGRESS NOTE - NSTXDEPRESPROGRES_PSY_ALL_CORE
Improving
Met - goal discontinued
Improving
Improving

## 2024-02-07 NOTE — BH INPATIENT PSYCHIATRY PROGRESS NOTE - NSTXSUICIDPROGRES_PSY_ALL_CORE
Improving
Met - goal discontinued
Improving
No Change
Met - goal discontinued
Met - goal discontinued
Improving
Met - goal discontinued
Met - goal discontinued
Improving
Improving
Met - goal discontinued
Improving
Improving
Met - goal discontinued

## 2024-02-07 NOTE — BH INPATIENT PSYCHIATRY PROGRESS NOTE - NSBHCHARTREVIEWVS_PSY_A_CORE FT
Vital Signs Last 24 Hrs  T(C): 35.6 (02-07-24 @ 08:18), Max: 36.8 (02-06-24 @ 15:47)  T(F): 96 (02-07-24 @ 08:18), Max: 98.2 (02-06-24 @ 15:47)  HR: 72 (02-07-24 @ 08:18) (72 - 94)  BP: 104/63 (02-07-24 @ 08:18) (104/63 - 109/56)  BP(mean): --  RR: 16 (02-07-24 @ 08:18) (16 - 16)  SpO2: --

## 2024-02-07 NOTE — BH INPATIENT PSYCHIATRY PROGRESS NOTE - NSTXDISORGGOAL_PSY_ALL_CORE
Will identify 2 coping skills that assist in organizing
Will demonstrate purposeful and predictable thoughts/behaviors by making a request
Will identify 2 coping skills that assist in organizing
Will demonstrate purposeful and predictable thoughts/behaviors by making a request
Will demonstrate purposeful and predictable thoughts/behaviors by making a request
Will identify 2 coping skills that assist in organizing
Will identify 2 coping skills that assist in organizing
Will demonstrate purposeful and predictable thoughts/behaviors by making a request

## 2024-02-07 NOTE — BH INPATIENT PSYCHIATRY PROGRESS NOTE - NSTXPROBDEPRES_PSY_ALL_CORE
Please call Oliver[facility].  His sleep study shows severe sleep apnea.  I recommend he see a sleep specialist for further evaluation. He may need to have another sleep study in the lab.  Please give him Dr. Bridger Sidhu's name and number: He is at the [facility] in [place]: [phone].  
DEPRESSIVE SYMPTOMS

## 2024-02-07 NOTE — BH INPATIENT PSYCHIATRY PROGRESS NOTE - NS ED [facility] MED ROS PSYCHIATRIC
See HPI
The patient is at high risk for a choroidal neovascular membrane. NO CNV SEEN TODAY. Dry ARMD is responsible for some decrease in vision.
See HPI

## 2024-02-07 NOTE — BH INPATIENT PSYCHIATRY PROGRESS NOTE - NSTXPSYCHOINTERMD_PSY_ALL_CORE
Medications management, reality reorientation, psychotherapy

## 2024-02-07 NOTE — BH INPATIENT PSYCHIATRY PROGRESS NOTE - NSICDXBHSECONDARYDX_PSY_ALL_CORE
Polysubstance abuse   F19.10  
Home
Polysubstance abuse   F19.10  

## 2024-02-07 NOTE — BH INPATIENT PSYCHIATRY PROGRESS NOTE - NSBHCHARTREVIEWLAB_PSY_A_CORE FT
Complete Blood Count in AM (01.25.24 @ 08:00)   Nucleated RBC: 0 /100 WBCs  WBC Count: 2.46 K/uL  RBC Count: 5.51 M/uL  Hemoglobin: 14.5 g/dL  Hematocrit: 43.7 %  Mean Cell Volume: 79.3 fL  Mean Cell Hemoglobin: 26.3 pg  Mean Cell Hemoglobin Conc: 33.2 g/dL  Red Cell Distrib Width: 13.3 %  Platelet Count - Automated: 92 K/uL  MPV: 9.8 fL  Differential (01.25.24 @ 08:00)   Auto Eosinophil %: 8.0 %  Auto Basophil #: 0.05 K/uL  Auto Basophil %: 2.0 %  Auto Eosinophil #: 0.20 K/uL  Auto Neutrophil #: 1.43 K/uL  Auto Neutrophil %: 54.0: Differential percentages must be correlated with absolute numbers for   clinical significance. %  Auto Monocyte #: 0.54 K/uL  Auto Monocyte %: 22.0 %  Auto Lymphocyte #: 0.25 K/uL  Auto Lymphocyte %: 10.0 %  Comprehensive Metabolic Panel in AM (01.25.24 @ 08:00)   Sodium: 137 mmol/L  Potassium: 4.2 mmol/L  Chloride: 98 mmol/L  Carbon Dioxide: 30 mmol/L  Anion Gap: 9 mmol/L  Blood Urea Nitrogen: 11 mg/dL  Creatinine: 1.1 mg/dL  Glucose: 155 mg/dL  Calcium: 9.3 mg/dL  Protein Total: 6.3 g/dL  Albumin: 4.1 g/dL  Bilirubin Total: 0.7 mg/dL  Alkaline Phosphatase: 160 U/L  Aspartate Aminotransferase (AST/SGOT): 113 U/L  Alanine Aminotransferase (ALT/SGPT): 183 U/L  eGFR: 86:   Respiratory Viral Panel with COVID-19 by MIHIR (01.24.24 @ 18:10)   Rapid RVP Result: [facility]  SARS-CoV-2: NotDete:
COVID-19 PCR . (01.13.24 @ 09:53)   COVID-19 PCR: Detected:  Hepatic Function Panel (01.13.24 @ 00:00)   Indirect Reacting Bilirubin: 1.4 mg/dL  Protein Total: 6.4 g/dL  Albumin: 4.6 g/dL  Bilirubin Total: 1.7 mg/dL  Bilirubin Direct: 0.3 mg/dL  Alkaline Phosphatase: 76 U/L  Aspartate Aminotransferase (AST/SGOT): 21 U/L  Alanine Aminotransferase (ALT/SGPT): 18 U/L  Basic Metabolic Panel - STAT (01.13.24 @ 00:00)   Sodium: 143 mmol/L  Potassium: 3.6 mmol/L  Chloride: 104 mmol/L  Carbon Dioxide: 27 mmol/L  Anion Gap: 12 mmol/L  Blood Urea Nitrogen: 20 mg/dL  Creatinine: 1.1 mg/dL  Glucose: 65 mg/dL  Calcium: 9.8 mg/dL  eGFR: 86  Complete Blood Count + Automated Diff (01.13.24 @ 00:00)   WBC Count: 6.71 K/uL  RBC Count: 5.38 M/uL  Hemoglobin: 14.3 g/dL  Hematocrit: 44.1 %  Mean Cell Volume: 82.0 fL  Mean Cell Hemoglobin: 26.6 pg  Mean Cell Hemoglobin Conc: 32.4 g/dL  Red Cell Distrib Width: 13.0 %  Platelet Count - Automated: 174 K/uL  MPV: 9.5 fL  Auto Neutrophil #: 4.81 K/uL  Auto Lymphocyte #: 0.54 K/uL  Auto Monocyte #: 0.91 K/uL  Auto Eosinophil #: 0.41 K/uL  Auto Basophil #: 0.03 K/uL  Auto Neutrophil %: 71.8  Auto Lymphocyte %: 8.0 %  Auto Monocyte %: 13.6 %  Auto Eosinophil %: 6.1 %  Auto Basophil %: 0.4 %  Auto Immature Granulocyte %: 0.1: (Includes meta, myelo and promyelocytes). Mild elevations in immature   granulocytes may be seen with many inflammatory processes and pregnancy;   clinical correlation suggested. %  Nucleated RBC: 0 /100 WBCs
Complete Blood Count in AM (01.25.24 @ 08:00)   Nucleated RBC: 0 /100 WBCs  WBC Count: 2.46 K/uL  RBC Count: 5.51 M/uL  Hemoglobin: 14.5 g/dL  Hematocrit: 43.7 %  Mean Cell Volume: 79.3 fL  Mean Cell Hemoglobin: 26.3 pg  Mean Cell Hemoglobin Conc: 33.2 g/dL  Red Cell Distrib Width: 13.3 %  Platelet Count - Automated: 92 K/uL  MPV: 9.8 fL  Differential (01.25.24 @ 08:00)   Auto Eosinophil %: 8.0 %  Auto Basophil #: 0.05 K/uL  Auto Basophil %: 2.0 %  Auto Eosinophil #: 0.20 K/uL  Auto Neutrophil #: 1.43 K/uL  Auto Neutrophil %: 54.0: Differential percentages must be correlated with absolute numbers for   clinical significance. %  Auto Monocyte #: 0.54 K/uL  Auto Monocyte %: 22.0 %  Auto Lymphocyte #: 0.25 K/uL  Auto Lymphocyte %: 10.0 %  Comprehensive Metabolic Panel in AM (01.25.24 @ 08:00)   Sodium: 137 mmol/L  Potassium: 4.2 mmol/L  Chloride: 98 mmol/L  Carbon Dioxide: 30 mmol/L  Anion Gap: 9 mmol/L  Blood Urea Nitrogen: 11 mg/dL  Creatinine: 1.1 mg/dL  Glucose: 155 mg/dL  Calcium: 9.3 mg/dL  Protein Total: 6.3 g/dL  Albumin: 4.1 g/dL  Bilirubin Total: 0.7 mg/dL  Alkaline Phosphatase: 160 U/L  Aspartate Aminotransferase (AST/SGOT): 113 U/L  Alanine Aminotransferase (ALT/SGPT): 183 U/L  eGFR: 86:   Respiratory Viral Panel with COVID-19 by MIHIR (01.24.24 @ 18:10)   [facility]  SARS-CoV-2: NotDete:
Complete Blood Count + Automated Diff in AM (01.31.24 @ 04:30)   WBC Count: 9.56 K/uL  RBC Count: 5.39 M/uL  Hemoglobin: 14.5 g/dL  Hematocrit: 41.4 %  Mean Cell Volume: 76.8 fL  Mean Cell Hemoglobin: 26.9 pg  Mean Cell Hemoglobin Conc: 35.0 g/dL  Red Cell Distrib Width: 13.8 %  Platelet Count - Automated: 235 K/uL  MPV: 9.3 fL  Auto Neutrophil #: 7.43 K/uL  Auto Lymphocyte #: 0.58 K/uL  Auto Monocyte #: 0.89 K/uL  Auto Eosinophil #: 0.21 K/uL  Auto Basophil #: 0.06 K/uL  Auto Neutrophil %: 77.7%  Auto Lymphocyte %: 6.1 %  Auto Monocyte %: 9.3 %  Auto Eosinophil %: 2.2 %  Auto Basophil %: 0.6 %  Auto Immature Granulocyte %: 4.1%  Nucleated RBC: 0 /100 WBCs  Comprehensive Metabolic Panel (01.29.24 @ 11:48)   Sodium: 138 mmol/L  Potassium: 4.1 mmol/L  Chloride: 98 mmol/L  Carbon Dioxide: 32 mmol/L  Anion Gap: 8 mmol/L  Blood Urea Nitrogen: 13 mg/dL  Creatinine: 1.0 mg/dL  Glucose: 83 mg/dL  Calcium: 9.7 mg/dL  Protein Total: 6.3 g/dL  Albumin: 4.2 g/dL  Bilirubin Total: 0.6 mg/dL  Alkaline Phosphatase: 142 U/L  Aspartate Aminotransferase (AST/SGOT): 36 U/L  Alanine Aminotransferase (ALT/SGPT): 91 U/L  eGFR: 97
COVID-19 PCR . (01.13.24 @ 09:53)   COVID-19 PCR: Detected:  Hepatic Function Panel (01.13.24 @ 00:00)   Indirect Reacting Bilirubin: 1.4 mg/dL  Protein Total: 6.4 g/dL  Albumin: 4.6 g/dL  Bilirubin Total: 1.7 mg/dL  Bilirubin Direct: 0.3 mg/dL  Alkaline Phosphatase: 76 U/L  Aspartate Aminotransferase (AST/SGOT): 21 U/L  Alanine Aminotransferase (ALT/SGPT): 18 U/L  Basic Metabolic Panel - STAT (01.13.24 @ 00:00)   Sodium: 143 mmol/L  Potassium: 3.6 mmol/L  Chloride: 104 mmol/L  Carbon Dioxide: 27 mmol/L  Anion Gap: 12 mmol/L  Blood Urea Nitrogen: 20 mg/dL  Creatinine: 1.1 mg/dL  Glucose: 65 mg/dL  Calcium: 9.8 mg/dL  eGFR: 86  Complete Blood Count + Automated Diff (01.13.24 @ 00:00)   WBC Count: 6.71 K/uL  RBC Count: 5.38 M/uL  Hemoglobin: 14.3 g/dL  Hematocrit: 44.1 %  Mean Cell Volume: 82.0 fL  Mean Cell Hemoglobin: 26.6 pg  Mean Cell Hemoglobin Conc: 32.4 g/dL  Red Cell Distrib Width: 13.0 %  Platelet Count - Automated: 174 K/uL  MPV: 9.5 fL  Auto Neutrophil #: 4.81 K/uL  Auto Lymphocyte #: 0.54 K/uL  Auto Monocyte #: 0.91 K/uL  Auto Eosinophil #: 0.41 K/uL  Auto Basophil #: 0.03 K/uL  Auto Neutrophil %: 71.8  Auto Lymphocyte %: 8.0 %  Auto Monocyte %: 13.6 %  Auto Eosinophil %: 6.1 %  Auto Basophil %: 0.4 %  Auto Immature Granulocyte %: 0.1: (Includes meta, myelo and promyelocytes). Mild elevations in immature   granulocytes may be seen with many inflammatory processes and pregnancy;   clinical correlation suggested. %  Nucleated RBC: 0 /100 WBCs
COVID-19 PCR . (01.13.24 @ 09:53)   COVID-19 PCR: Detected:  Hepatic Function Panel (01.13.24 @ 00:00)   Indirect Reacting Bilirubin: 1.4 mg/dL  Protein Total: 6.4 g/dL  Albumin: 4.6 g/dL  Bilirubin Total: 1.7 mg/dL  Bilirubin Direct: 0.3 mg/dL  Alkaline Phosphatase: 76 U/L  Aspartate Aminotransferase (AST/SGOT): 21 U/L  Alanine Aminotransferase (ALT/SGPT): 18 U/L  Basic Metabolic Panel - STAT (01.13.24 @ 00:00)   Sodium: 143 mmol/L  Potassium: 3.6 mmol/L  Chloride: 104 mmol/L  Carbon Dioxide: 27 mmol/L  Anion Gap: 12 mmol/L  Blood Urea Nitrogen: 20 mg/dL  Creatinine: 1.1 mg/dL  Glucose: 65 mg/dL  Calcium: 9.8 mg/dL  eGFR: 86  Complete Blood Count + Automated Diff (01.13.24 @ 00:00)   WBC Count: 6.71 K/uL  RBC Count: 5.38 M/uL  Hemoglobin: 14.3 g/dL  Hematocrit: 44.1 %  Mean Cell Volume: 82.0 fL  Mean Cell Hemoglobin: 26.6 pg  Mean Cell Hemoglobin Conc: 32.4 g/dL  Red Cell Distrib Width: 13.0 %  Platelet Count - Automated: 174 K/uL  MPV: 9.5 fL  Auto Neutrophil #: 4.81 K/uL  Auto Lymphocyte #: 0.54 K/uL  Auto Monocyte #: 0.91 K/uL  Auto Eosinophil #: 0.41 K/uL  Auto Basophil #: 0.03 K/uL  Auto Neutrophil %: 71.8  Auto Lymphocyte %: 8.0 %  Auto Monocyte %: 13.6 %  Auto Eosinophil %: 6.1 %  Auto Basophil %: 0.4 %  Auto Immature Granulocyte %: 0.1: (Includes meta, myelo and promyelocytes). Mild elevations in immature   granulocytes may be seen with many inflammatory processes and pregnancy;   clinical correlation suggested. %  Nucleated RBC: 0 /100 WBCs
Complete Blood Count in AM (01.25.24 @ 08:00)   Nucleated RBC: 0 /100 WBCs  WBC Count: 2.46 K/uL  RBC Count: 5.51 M/uL  Hemoglobin: 14.5 g/dL  Hematocrit: 43.7 %  Mean Cell Volume: 79.3 fL  Mean Cell Hemoglobin: 26.3 pg  Mean Cell Hemoglobin Conc: 33.2 g/dL  Red Cell Distrib Width: 13.3 %  Platelet Count - Automated: 92 K/uL  MPV: 9.8 fL  Differential (01.25.24 @ 08:00)   Auto Eosinophil %: 8.0 %  Auto Basophil #: 0.05 K/uL  Auto Basophil %: 2.0 %  Auto Eosinophil #: 0.20 K/uL  Auto Neutrophil #: 1.43 K/uL  Auto Neutrophil %: 54.0: Differential percentages must be correlated with absolute numbers for   clinical significance. %  Auto Monocyte #: 0.54 K/uL  Auto Monocyte %: 22.0 %  Auto Lymphocyte #: 0.25 K/uL  Auto Lymphocyte %: 10.0 %  Comprehensive Metabolic Panel in AM (01.25.24 @ 08:00)   Sodium: 137 mmol/L  Potassium: 4.2 mmol/L  Chloride: 98 mmol/L  Carbon Dioxide: 30 mmol/L  Anion Gap: 9 mmol/L  Blood Urea Nitrogen: 11 mg/dL  Creatinine: 1.1 mg/dL  Glucose: 155 mg/dL  Calcium: 9.3 mg/dL  Protein Total: 6.3 g/dL  Albumin: 4.1 g/dL  Bilirubin Total: 0.7 mg/dL  Alkaline Phosphatase: 160 U/L  Aspartate Aminotransferase (AST/SGOT): 113 U/L  Alanine Aminotransferase (ALT/SGPT): 183 U/L  eGFR: 86:   Respiratory Viral Panel with COVID-19 by MIHIR (01.24.24 @ 18:10)   [facility]  SARS-CoV-2: NotDete:
Complete Blood Count in AM (01.25.24 @ 08:00)   Nucleated RBC: 0 /100 WBCs  WBC Count: 2.46 K/uL  RBC Count: 5.51 M/uL  Hemoglobin: 14.5 g/dL  Hematocrit: 43.7 %  Mean Cell Volume: 79.3 fL  Mean Cell Hemoglobin: 26.3 pg  Mean Cell Hemoglobin Conc: 33.2 g/dL  Red Cell Distrib Width: 13.3 %  Platelet Count - Automated: 92 K/uL  MPV: 9.8 fL  Differential (01.25.24 @ 08:00)   Auto Eosinophil %: 8.0 %  Auto Basophil #: 0.05 K/uL  Auto Basophil %: 2.0 %  Auto Eosinophil #: 0.20 K/uL  Auto Neutrophil #: 1.43 K/uL  Auto Neutrophil %: 54.0: Differential percentages must be correlated with absolute numbers for   clinical significance. %  Auto Monocyte #: 0.54 K/uL  Auto Monocyte %: 22.0 %  Auto Lymphocyte #: 0.25 K/uL  Auto Lymphocyte %: 10.0 %  Comprehensive Metabolic Panel in AM (01.25.24 @ 08:00)   Sodium: 137 mmol/L  Potassium: 4.2 mmol/L  Chloride: 98 mmol/L  Carbon Dioxide: 30 mmol/L  Anion Gap: 9 mmol/L  Blood Urea Nitrogen: 11 mg/dL  Creatinine: 1.1 mg/dL  Glucose: 155 mg/dL  Calcium: 9.3 mg/dL  Protein Total: 6.3 g/dL  Albumin: 4.1 g/dL  Bilirubin Total: 0.7 mg/dL  Alkaline Phosphatase: 160 U/L  Aspartate Aminotransferase (AST/SGOT): 113 U/L  Alanine Aminotransferase (ALT/SGPT): 183 U/L  eGFR: 86:   Respiratory Viral Panel with COVID-19 by MIHIR (01.24.24 @ 18:10)   [facility]  SARS-CoV-2: NotDete:
Complete Blood Count + Automated Diff in AM (01.31.24 @ 04:30)   WBC Count: 9.56 K/uL  RBC Count: 5.39 M/uL  Hemoglobin: 14.5 g/dL  Hematocrit: 41.4 %  Mean Cell Volume: 76.8 fL  Mean Cell Hemoglobin: 26.9 pg  Mean Cell Hemoglobin Conc: 35.0 g/dL  Red Cell Distrib Width: 13.8 %  Platelet Count - Automated: 235 K/uL  MPV: 9.3 fL  Auto Neutrophil #: 7.43 K/uL  Auto Lymphocyte #: 0.58 K/uL  Auto Monocyte #: 0.89 K/uL  Auto Eosinophil #: 0.21 K/uL  Auto Basophil #: 0.06 K/uL  Auto Neutrophil %: 77.7%  Auto Lymphocyte %: 6.1 %  Auto Monocyte %: 9.3 %  Auto Eosinophil %: 2.2 %  Auto Basophil %: 0.6 %  Auto Immature Granulocyte %: 4.1%  Nucleated RBC: 0 /100 WBCs  Comprehensive Metabolic Panel (01.29.24 @ 11:48)   Sodium: 138 mmol/L  Potassium: 4.1 mmol/L  Chloride: 98 mmol/L  Carbon Dioxide: 32 mmol/L  Anion Gap: 8 mmol/L  Blood Urea Nitrogen: 13 mg/dL  Creatinine: 1.0 mg/dL  Glucose: 83 mg/dL  Calcium: 9.7 mg/dL  Protein Total: 6.3 g/dL  Albumin: 4.2 g/dL  Bilirubin Total: 0.6 mg/dL  Alkaline Phosphatase: 142 U/L  Aspartate Aminotransferase (AST/SGOT): 36 U/L  Alanine Aminotransferase (ALT/SGPT): 91 U/L  eGFR: 97
Complete Blood Count in AM (01.25.24 @ 08:00)   Nucleated RBC: 0 /100 WBCs  WBC Count: 2.46 K/uL  RBC Count: 5.51 M/uL  Hemoglobin: 14.5 g/dL  Hematocrit: 43.7 %  Mean Cell Volume: 79.3 fL  Mean Cell Hemoglobin: 26.3 pg  Mean Cell Hemoglobin Conc: 33.2 g/dL  Red Cell Distrib Width: 13.3 %  Platelet Count - Automated: 92 K/uL  MPV: 9.8 fL  Differential (01.25.24 @ 08:00)   Auto Eosinophil %: 8.0 %  Auto Basophil #: 0.05 K/uL  Auto Basophil %: 2.0 %  Auto Eosinophil #: 0.20 K/uL  Auto Neutrophil #: 1.43 K/uL  Auto Neutrophil %: 54.0: Differential percentages must be correlated with absolute numbers for   clinical significance. %  Auto Monocyte #: 0.54 K/uL  Auto Monocyte %: 22.0 %  Auto Lymphocyte #: 0.25 K/uL  Auto Lymphocyte %: 10.0 %  Comprehensive Metabolic Panel in AM (01.25.24 @ 08:00)   Sodium: 137 mmol/L  Potassium: 4.2 mmol/L  Chloride: 98 mmol/L  Carbon Dioxide: 30 mmol/L  Anion Gap: 9 mmol/L  Blood Urea Nitrogen: 11 mg/dL  Creatinine: 1.1 mg/dL  Glucose: 155 mg/dL  Calcium: 9.3 mg/dL  Protein Total: 6.3 g/dL  Albumin: 4.1 g/dL  Bilirubin Total: 0.7 mg/dL  Alkaline Phosphatase: 160 U/L  Aspartate Aminotransferase (AST/SGOT): 113 U/L  Alanine Aminotransferase (ALT/SGPT): 183 U/L  eGFR: 86:   Respiratory Viral Panel with COVID-19 by MIHIR (01.24.24 @ 18:10)   Rapid RVP Result: [facility]  SARS-CoV-2: NotDete:
COVID-19 PCR . (01.13.24 @ 09:53)   COVID-19 PCR: Detected:  Hepatic Function Panel (01.13.24 @ 00:00)   Indirect Reacting Bilirubin: 1.4 mg/dL  Protein Total: 6.4 g/dL  Albumin: 4.6 g/dL  Bilirubin Total: 1.7 mg/dL  Bilirubin Direct: 0.3 mg/dL  Alkaline Phosphatase: 76 U/L  Aspartate Aminotransferase (AST/SGOT): 21 U/L  Alanine Aminotransferase (ALT/SGPT): 18 U/L  Basic Metabolic Panel - STAT (01.13.24 @ 00:00)   Sodium: 143 mmol/L  Potassium: 3.6 mmol/L  Chloride: 104 mmol/L  Carbon Dioxide: 27 mmol/L  Anion Gap: 12 mmol/L  Blood Urea Nitrogen: 20 mg/dL  Creatinine: 1.1 mg/dL  Glucose: 65 mg/dL  Calcium: 9.8 mg/dL  eGFR: 86  Complete Blood Count + Automated Diff (01.13.24 @ 00:00)   WBC Count: 6.71 K/uL  RBC Count: 5.38 M/uL  Hemoglobin: 14.3 g/dL  Hematocrit: 44.1 %  Mean Cell Volume: 82.0 fL  Mean Cell Hemoglobin: 26.6 pg  Mean Cell Hemoglobin Conc: 32.4 g/dL  Red Cell Distrib Width: 13.0 %  Platelet Count - Automated: 174 K/uL  MPV: 9.5 fL  Auto Neutrophil #: 4.81 K/uL  Auto Lymphocyte #: 0.54 K/uL  Auto Monocyte #: 0.91 K/uL  Auto Eosinophil #: 0.41 K/uL  Auto Basophil #: 0.03 K/uL  Auto Neutrophil %: 71.8  Auto Lymphocyte %: 8.0 %  Auto Monocyte %: 13.6 %  Auto Eosinophil %: 6.1 %  Auto Basophil %: 0.4 %  Auto Immature Granulocyte %: 0.1: (Includes meta, myelo and promyelocytes). Mild elevations in immature   granulocytes may be seen with many inflammatory processes and pregnancy;   clinical correlation suggested. %  Nucleated RBC: 0 /100 WBCs

## 2024-02-07 NOTE — BH INPATIENT PSYCHIATRY PROGRESS NOTE - NSTXPSYCHODATEEST_PSY_ALL_CORE
14-Jan-2024
16-Jan-2024
14-Jan-2024
16-Jan-2024
14-Jan-2024
16-Jan-2024
14-Jan-2024
14-Jan-2024
16-Jan-2024
14-Jan-2024
16-Jan-2024

## 2024-02-07 NOTE — BH INPATIENT PSYCHIATRY PROGRESS NOTE - NSBHMSEMOOD_PSY_A_CORE
Normal
Anxious
Anxious
Depressed
Anxious
Normal
Normal/Anxious
Normal/Anxious
Anxious
Anxious
Other
Normal/Anxious
Depressed
Anxious
Anxious
Normal
Depressed
Depressed

## 2024-02-07 NOTE — BH INPATIENT PSYCHIATRY PROGRESS NOTE - NSTXPROBPSYCHO_PSY_ALL_CORE
After Visit Summary   5/11/2018    Ruby Morrison    MRN: [id number]           Patient Information     Date Of Birth          1985        Visit Information        Provider Department      5/11/2018 9:00 AM Tomasa Bernal MD [facility]        Today's Diagnoses     Chronic hypertension affecting pregnancy    -  1    Group B Streptococcus urinary tract infection affecting pregnancy in first trimester        Encounter for supervision of other normal pregnancy in third trimester           Follow-ups after your visit        Your next 10 appointments already scheduled     May 16, 2018 11:45 AM CDT   REEMA BPP SINGLE with [id number]   [facility] [place] ([facility])    [address]  [facility] [id number] [phone]            May 16, 2018 12:15 PM CDT   Radiology MD with UR REEMA LISA   [facility]    [address]  [facility] [address]   [phone]           Please arrive at the time given for your first appointment. This visit is used internally to schedule the physician's time during your ultrasound.              Who to contact     If you have questions or need follow up information about today's clinic visit or your schedule please contact [facility] directly at [phone].  Normal or non-critical lab and imaging results will be communicated to you by MyChart, letter or phone within 4 business days after the clinic has received the results. If you do not hear from us within 7 days, please contact the clinic through MyChart or phone. If you have a critical or abnormal lab result, we will notify you by phone as soon as possible.  Submit refill requests through [employer] or call your pharmacy and they will forward the refill request to us. Please allow 3 business days for your refill to be 
PSYCHOTIC SYMPTOMS
completed.          Additional Information About Your Visit        agency[employer]hart Information     [employer] gives you secure access to your electronic health record. If you see a primary care provider, you can also send messages to your care team and make appointments. If you have questions, please call your primary care clinic.  If you do not have a primary care provider, please call [phone] and they will assist you.        Care EveryWhere ID     This is your Care EveryWhere ID. This could be used by other organizations to access your [facility] medical records  [id number]        Your Vitals Were     Pulse Temperature BMI (Body Mass Index)             95 96.6  F (35.9  C) (Oral) 46.32 kg/m2          Blood Pressure from Last 3 Encounters:   05/11/18 (!) 154/96   05/02/18 (!) 144/92   04/26/18 (!) 146/98    Weight from Last 3 Encounters:   05/11/18 237 lb 3.2 oz (107.6 kg)   05/02/18 236 lb 4.8 oz (107.2 kg)   04/26/18 230 lb (104.3 kg)              We Performed the Following     ALT     AST     CBC with platelets     Creatinine     Protein  random urine with Creat Ratio        Primary Care Provider Office Phone # Fax #    Mesha JohnDO [phone]       [facility] [address]        Equal Access to Services     BRIGID HWANG : Hadii ivy ku hadasho Soomaali, waaxda luqadaha, qaybta kaalmada adeegyada, waxay idiin hayrenen cricket vaca. So [facility] [phone].    ATENCIÓN: Si habla español, tiene a mendez disposición servicios gratuitos de asistencia lingüística. Llame al [phone].    We comply with applicable federal civil rights laws and [place] laws. We do not discriminate on the basis of race, color, national origin, age, disability, sex, sexual orientation, or gender identity.            Thank you!     Thank you for choosing [facility]  for your care. Our goal is always to provide you with excellent care. Hearing back from our patients is one way we can 
continue to improve our services. Please take a few minutes to complete the written survey that you may receive in the mail after your visit with us. Thank you!             Your Updated Medication List - Protect others around you: Learn how to safely use, store and throw away your medicines at www.disposemymeds.org.          This list is accurate as of 5/11/18  9:24 AM.  Always use your most recent med list.                   Brand Name Dispense Instructions for use Diagnosis    albuterol 108 (90 Base) MCG/ACT Inhaler    PROAIR HFA/PROVENTIL HFA/VENTOLIN HFA    1 Inhaler    Inhale 2 puffs into the lungs every 4 hours as needed for shortness of breath / dyspnea    Mild persistent asthma without complication       ASPIRIN PO      Take 81 mg by mouth        diclofenac 1 % Gel topical gel    VOLTAREN    100 g    Apply  2 grams to wrist area up to four times daily as needed using enclosed dosing card.    Radial styloid tenosynovitis, Right wrist pain       montelukast 10 MG tablet    SINGULAIR    90 tablet    Take 1 tablet (10 mg) by mouth At Bedtime    Mild persistent asthma without complication       order for DME     1 each    Equipment being ordered: Wrist brace    Radial styloid tenosynovitis of right hand       PRENATAL VITAMIN PO           VITAMIN D (CHOLECALCIFEROL) PO      Take 1,000 Units by mouth daily          
PSYCHOTIC SYMPTOMS

## 2024-02-07 NOTE — BH INPATIENT PSYCHIATRY PROGRESS NOTE - NSBHROSSYSTEMS_PSY_ALL_CORE
Psychiatric
Constitutional Symptoms.../Psychiatric

## 2024-02-07 NOTE — BH INPATIENT PSYCHIATRY PROGRESS NOTE - NSBHMETABOLIC_PSY_ALL_CORE_FT
BMI: BMI (kg/m2): 24.6 (01-14-24 @ 12:21)  HbA1c: A1C with Estimated Average Glucose Result: 5.1 % (01-15-24 @ 08:34)    Glucose: POCT Blood Glucose.: 128 mg/dL (01-13-24 @ 04:49)    BP: 104/63 (02-07-24 @ 08:18) (96/51 - 128/95)Vital Signs Last 24 Hrs  T(C): 35.6 (02-07-24 @ 08:18), Max: 36.8 (02-06-24 @ 15:47)  T(F): 96 (02-07-24 @ 08:18), Max: 98.2 (02-06-24 @ 15:47)  HR: 72 (02-07-24 @ 08:18) (72 - 94)  BP: 104/63 (02-07-24 @ 08:18) (104/63 - 109/56)  BP(mean): --  RR: 16 (02-07-24 @ 08:18) (16 - 16)  SpO2: --      Lipid Panel: Date/Time: 01-15-24 @ 08:34  Cholesterol, Serum: 110  LDL Cholesterol Calculated: 39  HDL Cholesterol, Serum: 40  Total Cholesterol/HDL Ration Measurement: --  Triglycerides, Serum: 154

## 2024-02-07 NOTE — BH INPATIENT PSYCHIATRY PROGRESS NOTE - NSBHFUPMEDSE_PSY_A_CORE
None known
Yes
None known
None known
Yes
None known

## 2024-02-07 NOTE — BH INPATIENT PSYCHIATRY PROGRESS NOTE - NSBHFUPINTERVALCCFT_PSY_A_CORE
"The voices are getting bad again"
"I feel good"
"Still hearing them"
"Still hearing voices" "Less frequent"
"I'm nervous"
"Same"
"Voices are still there" "Less loud"
"Voices are like when I first came"
"Fine"
"I don't feel sick anymore" "Still hearing voices"
"Still hearing voices" "Less loud"
"Much better now"
"Still hearing them"
"Ok"
"Somewhat better"
"The voices are gone"
"I'm hearing voices."
"I'm still hearing voices"

## 2024-02-07 NOTE — BH INPATIENT PSYCHIATRY PROGRESS NOTE - NSDCCRITERIA_PSY_ALL_CORE
When pt is no longer an acute or imminent risk of harm to self or/and others, and is able to care for self safely, pt may then be discharged
 symptoms resolution   patient will not  exhibit suicidal behavior

## 2024-02-07 NOTE — BH INPATIENT PSYCHIATRY PROGRESS NOTE - NSBHCONSDANGERSELF_PSY_A_CORE
suicidal behavior

## 2024-02-07 NOTE — BH INPATIENT PSYCHIATRY PROGRESS NOTE - NSTXPSYCHOGOAL_PSY_ALL_CORE
Will be able to report experiencing hallucinations to staff
Will report hearing a voice only momentarily a few times a day instead of all day
Will be able to report experiencing hallucinations to staff
Will be able to report experiencing hallucinations to staff
Will report hearing a voice only momentarily a few times a day instead of all day
Will be able to report experiencing hallucinations to staff
Will report hearing a voice only momentarily a few times a day instead of all day
Will be able to report experiencing hallucinations to staff
Will report hearing a voice only momentarily a few times a day instead of all day

## 2024-02-07 NOTE — BH INPATIENT PSYCHIATRY PROGRESS NOTE - NSTXDEPRESDATETRGT_PSY_ALL_CORE
30-Jan-2024
23-Jan-2024
23-Jan-2024
30-Jan-2024
23-Jan-2024
30-Jan-2024
23-Jan-2024
30-Jan-2024
23-Jan-2024

## 2024-02-07 NOTE — BH INPATIENT PSYCHIATRY PROGRESS NOTE - NSBHMSETHTCONTENT_PSY_A_CORE
Preoccupations
Unremarkable
Unremarkable
Preoccupations
Unremarkable
Preoccupations

## 2024-02-07 NOTE — BH INPATIENT PSYCHIATRY PROGRESS NOTE - NSCGIIMPROVESX_PSY_ALL_CORE
1 - Very much improved - nearly all better; good level of functioning; minimal symptoms; represents a very substantial change Syncope

## 2024-02-07 NOTE — BH INPATIENT PSYCHIATRY PROGRESS NOTE - NSTXDEPRESINTERMD_PSY_ALL_CORE
Medications managements, encourage groups/DBT

## 2024-02-07 NOTE — BH INPATIENT PSYCHIATRY PROGRESS NOTE - NSTXPSYCHOPROGRES_PSY_ALL_CORE
No Change
Improving
Improving
No Change
No Change
Improving
Improving
Met - goal discontinued
Improving
No Change
Met - goal discontinued
Improving

## 2024-02-07 NOTE — BH INPATIENT PSYCHIATRY PROGRESS NOTE - NSTXDEPRESDATEEST_PSY_ALL_CORE
16-Jan-2024
15-Fred-2024
16-Jan-2024
15-Fred-2024
16-Jan-2024
15-Fred-2024
16-Jan-2024
15-Fred-2024
16-Jan-2024

## 2024-02-07 NOTE — BH INPATIENT PSYCHIATRY PROGRESS NOTE - NSTXPSYCHODATETRGT_PSY_ALL_CORE
30-Jan-2024
06-Feb-2024
23-Jan-2024
06-Feb-2024
13-Feb-2024
23-Jan-2024
06-Feb-2024
13-Feb-2024
30-Jan-2024
23-Jan-2024
23-Jan-2024
30-Jan-2024
23-Jan-2024

## 2024-02-07 NOTE — BH INPATIENT PSYCHIATRY PROGRESS NOTE - NSBHMSEAFFRANGE_PSY_A_CORE
Full
Constricted
Full
Full
Constricted
Full
Constricted
Full
Constricted

## 2024-02-07 NOTE — BH INPATIENT PSYCHIATRY PROGRESS NOTE - NSTXSUICIDDATEEST_PSY_ALL_CORE
14-Jan-2024
17-Jan-2024
14-Jan-2024
14-Jan-2024
17-Jan-2024
14-Jan-2024
17-Jan-2024
17-Jan-2024
14-Jan-2024
17-Jan-2024
14-Jan-2024
14-Jan-2024
17-Jan-2024
14-Jan-2024
14-Jan-2024
17-Jan-2024
14-Jan-2024
14-Jan-2024

## 2024-02-07 NOTE — BH INPATIENT PSYCHIATRY PROGRESS NOTE - NSTXDISORGDATEEST_PSY_ALL_CORE
14-Jan-2024
19-Jan-2024
14-Jan-2024
15-Fred-2024
14-Jan-2024
19-Jan-2024
14-Jan-2024
15-Fred-2024
14-Jan-2024

## 2024-02-07 NOTE — BH INPATIENT PSYCHIATRY PROGRESS NOTE - NSTXDEPRESGOAL_PSY_ALL_CORE
Will identify 2 coping skills that assist in improving mood
Exhibit improvements in self-grooming, hygiene, sleep and appetite
Will identify 2 coping skills that assist in improving mood
Exhibit improvements in self-grooming, hygiene, sleep and appetite
Will identify 2 coping skills that assist in improving mood
Exhibit improvements in self-grooming, hygiene, sleep and appetite

## 2024-02-07 NOTE — BH INPATIENT PSYCHIATRY PROGRESS NOTE - NSTXSUICIDDATETRGT_PSY_ALL_CORE
26-Feb-2024
06-Feb-2024
26-Feb-2024
28-Jan-2024
06-Feb-2024
30-Jan-2024
06-Feb-2024
26-Feb-2024
06-Feb-2024
29-Feb-2024
29-Feb-2024
28-Jan-2024
26-Feb-2024
06-Feb-2024
29-Feb-2024
06-Feb-2024
06-Feb-2024
28-Jan-2024

## 2024-02-07 NOTE — BH INPATIENT PSYCHIATRY PROGRESS NOTE - NSTXDISORGINTERMD_PSY_ALL_CORE
Medications management

## 2024-02-07 NOTE — BH INPATIENT PSYCHIATRY PROGRESS NOTE - NSBHCHARTREVIEWINVESTIGATE_PSY_A_CORE FT
< from: 12 Lead ECG (01.13.24 @ 05:35) >      Ventricular Rate 85 BPM    Atrial Rate 85 BPM    P-R Interval 144 ms    QRS Duration 90 ms    Q-T Interval 384 ms    QTC Calculation(Bazett) 456 ms    P Axis 77 degrees    R Axis -59 degrees    T Axis 62 degrees    Diagnosis Line Normal sinus rhythm  Left anterior fascicular block  Abnormal ECG    < end of copied text >    
< from: 12 Lead ECG (01.13.24 @ 05:35) >      Ventricular Rate 85 BPM    Atrial Rate 85 BPM    P-R Interval 144 ms    QRS Duration 90 ms    Q-T Interval 384 ms    QTC Calculation(Bazett) 456 ms    P Axis 77 degrees    R Axis -59 degrees    T Axis 62 degrees    Diagnosis Line Normal sinus rhythm  Left anterior fascicular block  Abnormal ECG    < end of copied text >    
< from: Xray Chest 2 Views PA/Lat (01.24.24 @ 15:30) >    IMPRESSION:    No radiographic evidence of acute cardiopulmonary disease.    < end of copied text >      < from: 12 Lead ECG (01.13.24 @ 05:35) >      Ventricular Rate 85 BPM    Atrial Rate 85 BPM    P-R Interval 144 ms    QRS Duration 90 ms    Q-T Interval 384 ms    QTC Calculation(Bazett) 456 ms    P Axis 77 degrees    R Axis -59 degrees    T Axis 62 degrees    Diagnosis Line Normal sinus rhythm  Left anterior fascicular block  Abnormal ECG    < end of copied text >    
< from: 12 Lead ECG (01.13.24 @ 05:35) >      Ventricular Rate 85 BPM    Atrial Rate 85 BPM    P-R Interval 144 ms    QRS Duration 90 ms    Q-T Interval 384 ms    QTC Calculation(Bazett) 456 ms    P Axis 77 degrees    R Axis -59 degrees    T Axis 62 degrees    Diagnosis Line Normal sinus rhythm  Left anterior fascicular block  Abnormal ECG    < end of copied text >    
< from: Xray Chest 2 Views PA/Lat (01.24.24 @ 15:30) >    IMPRESSION:    No radiographic evidence of acute cardiopulmonary disease.    < end of copied text >      < from: 12 Lead ECG (01.13.24 @ 05:35) >      Ventricular Rate 85 BPM    Atrial Rate 85 BPM    P-R Interval 144 ms    QRS Duration 90 ms    Q-T Interval 384 ms    QTC Calculation(Bazett) 456 ms    P Axis 77 degrees    R Axis -59 degrees    T Axis 62 degrees    Diagnosis Line Normal sinus rhythm  Left anterior fascicular block  Abnormal ECG    < end of copied text >    
< from: 12 Lead ECG (01.13.24 @ 05:35) >      Ventricular Rate 85 BPM    Atrial Rate 85 BPM    P-R Interval 144 ms    QRS Duration 90 ms    Q-T Interval 384 ms    QTC Calculation(Bazett) 456 ms    P Axis 77 degrees    R Axis -59 degrees    T Axis 62 degrees    Diagnosis Line Normal sinus rhythm  Left anterior fascicular block  Abnormal ECG    < end of copied text >    
< from: 12 Lead ECG (01.13.24 @ 05:35) >      Ventricular Rate 85 BPM    Atrial Rate 85 BPM    P-R Interval 144 ms    QRS Duration 90 ms    Q-T Interval 384 ms    QTC Calculation(Bazett) 456 ms    P Axis 77 degrees    R Axis -59 degrees    T Axis 62 degrees    Diagnosis Line Normal sinus rhythm  Left anterior fascicular block  Abnormal ECG    < end of copied text >    
< from: Xray Chest 2 Views PA/Lat (01.24.24 @ 15:30) >    IMPRESSION:    No radiographic evidence of acute cardiopulmonary disease.    < end of copied text >      < from: 12 Lead ECG (01.13.24 @ 05:35) >      Ventricular Rate 85 BPM    Atrial Rate 85 BPM    P-R Interval 144 ms    QRS Duration 90 ms    Q-T Interval 384 ms    QTC Calculation(Bazett) 456 ms    P Axis 77 degrees    R Axis -59 degrees    T Axis 62 degrees    Diagnosis Line Normal sinus rhythm  Left anterior fascicular block  Abnormal ECG    < end of copied text >    

## 2024-02-07 NOTE — BH INPATIENT PSYCHIATRY PROGRESS NOTE - NSTXDISORGDATETRGT_PSY_ALL_CORE
28-Jan-2024
06-Feb-2024
30-Jan-2024
26-Jan-2024
30-Jan-2024
30-Jan-2024
06-Feb-2024
06-Feb-2024
27-Feb-2024
28-Jan-2024
30-Jan-2024
06-Feb-2024
30-Jan-2024
26-Jan-2024
28-Jan-2024
27-Feb-2024
06-Feb-2024
28-Jan-2024

## 2024-02-07 NOTE — BH INPATIENT PSYCHIATRY PROGRESS NOTE - NSBHMSEKNOWHOW_PSY_ALL_CORE
Vocabulary

## 2024-02-07 NOTE — BH INPATIENT PSYCHIATRY PROGRESS NOTE - NSBHFUPINTERVALHXFT_PSY_A_CORE
Pt seen and evaluated, chart reviewed. As per nursing report, no acute events overnight, no PRNs. On evaluation, pt presents pleasant and cooperative with bright affect. He reports he is doing well and ready for discharge today. He denies any new complaints. He endorses good mood, sleep and appetite. He denies AH and VH. Denies paranoia. Denies passive and active SI/HI, intent and plan. He is future-oriented with improved insight, verbalizes benefits of adhering to medications and agrees to OP f/u. He adherent to medications, denies negative side effects. Pt visible on unit and in behavioral control. Discharge today. Pt verbalizes understanding and agrees to discharge instructions.

## 2024-02-07 NOTE — BH INPATIENT PSYCHIATRY PROGRESS NOTE - NSBHATTESTBILLONSITE_PSY_A_CORE
SUYAPA to bill

## 2024-02-07 NOTE — BH INPATIENT PSYCHIATRY PROGRESS NOTE - NSTXPROBDISORG_PSY_ALL_CORE
DISORGANIZATION OF THOUGHT/BEHAVIOR

## 2024-02-07 NOTE — BH INPATIENT PSYCHIATRY PROGRESS NOTE - NSTXSUICIDGOAL_PSY_ALL_CORE
Be able to state 3 reasons for living
Be able to state 3 reasons for living
Talk to staff and ask for assistance when having suicidal wishes instead of acting out
Be able to state 3 reasons for living
Talk to staff and ask for assistance when having suicidal wishes instead of acting out
Will identify and utilize 2 coping skills
Be able to state 3 reasons for living
Will identify and utilize 2 coping skills
Be able to state 3 reasons for living
Will identify and utilize 2 coping skills
Be able to state 3 reasons for living
Will identify and utilize 2 coping skills
Be able to state 3 reasons for living
Talk to staff and ask for assistance when having suicidal wishes instead of acting out
Talk to staff and ask for assistance when having suicidal wishes instead of acting out
Will identify and utilize 2 coping skills

## 2024-02-07 NOTE — BH INPATIENT PSYCHIATRY PROGRESS NOTE - NSBHATTESTTYPEVISIT_PSY_A_CORE
On-site Attending supervising SUYAPA (99XXX codes)
Attending Only
On-site Attending supervising SUYPAA (99XXX codes)
On-site Attending supervising SUYAPA (99XXX codes)

## 2024-02-08 ENCOUNTER — APPOINTMENT (OUTPATIENT)
Dept: PSYCHIATRY | Facility: CLINIC | Age: 44
End: 2024-02-08

## 2024-02-09 DIAGNOSIS — F25.9 SCHIZOAFFECTIVE DISORDER, UNSPECIFIED: ICD-10-CM

## 2024-02-09 DIAGNOSIS — F43.10 POST-TRAUMATIC STRESS DISORDER, UNSPECIFIED: ICD-10-CM

## 2024-02-09 DIAGNOSIS — D69.6 THROMBOCYTOPENIA, UNSPECIFIED: ICD-10-CM

## 2024-02-09 DIAGNOSIS — F19.10 OTHER PSYCHOACTIVE SUBSTANCE ABUSE, UNCOMPLICATED: ICD-10-CM

## 2024-02-09 DIAGNOSIS — R45.851 SUICIDAL IDEATIONS: ICD-10-CM

## 2024-02-09 DIAGNOSIS — U07.1 COVID-19: ICD-10-CM

## 2024-02-09 NOTE — BH SOCIAL WORK CONFIRMATION FOLLOW UP NOTE - NSCOMMENTS_PSY_ALL_CORE
Lauren broke his outpatient mental health appointment at [facility] Sarmad Hernandez on 2/9, [phone]. Patient was discharged to the [facility] in [place] at his request. Wellness check referral cannot be sent to [facility] mobile crisis unit since they do not perform outreach at [facility] in [place]. Loss of contact post discharge.

## 2024-02-14 ENCOUNTER — APPOINTMENT (OUTPATIENT)
Dept: PSYCHIATRY | Facility: CLINIC | Age: 44
End: 2024-02-14

## 2024-02-21 NOTE — BH DISCHARGE NOTE NURSING/SOCIAL WORK/PSYCH REHAB - NSBHDCADDR2FT_A_CORE
regular [address] [facility] [id number]  [facility], [address] [place] [address], [place] [address]

## 2024-02-26 NOTE — BH PATIENT PROFILE - NSBHTHTCONTENT_PSY_A_CORE
[Time Spent: ___ minutes] : I have spent [unfilled] minutes of time on the encounter. [TextEntry] : Physician Assistant Statement: Case was discussed and supervised by attending physician. organized

## 2024-05-02 NOTE — DISCHARGE NOTE BEHAVIORAL HEALTH - INSTRUCTIONS
Quality 130: Documentation Of Current Medications In The Medical Record: Current Medications Documented Quality 226: Preventive Care And Screening: Tobacco Use: Screening And Cessation Intervention: Patient screened for tobacco use and is an ex/non-smoker Detail Level: Detailed Quality 431: Preventive Care And Screening: Unhealthy Alcohol Use - Screening: Patient not identified as an unhealthy alcohol user when screened for unhealthy alcohol use using a systematic screening method Potentially exposed to COVID-19 positive patient. Recommend to continue to monitor for Covid-19 symptoms and may quarantine for 10 days

## 2024-07-06 ENCOUNTER — EMERGENCY (EMERGENCY)
Facility: HOSPITAL | Age: 44
LOS: 0 days | Discharge: HOME | End: 2024-07-06
Attending: EMERGENCY MEDICINE
Payer: MEDICAID

## 2024-07-06 VITALS
SYSTOLIC BLOOD PRESSURE: 114 MMHG | OXYGEN SATURATION: 100 % | TEMPERATURE: 210 F | HEART RATE: 100 BPM | DIASTOLIC BLOOD PRESSURE: 69 MMHG

## 2024-07-06 VITALS
DIASTOLIC BLOOD PRESSURE: 81 MMHG | OXYGEN SATURATION: 99 % | TEMPERATURE: 98 F | HEIGHT: 67 IN | HEART RATE: 99 BPM | SYSTOLIC BLOOD PRESSURE: 149 MMHG | WEIGHT: 134.92 LBS | RESPIRATION RATE: 18 BRPM

## 2024-07-06 DIAGNOSIS — F17.200 NICOTINE DEPENDENCE, UNSPECIFIED, UNCOMPLICATED: ICD-10-CM

## 2024-07-06 DIAGNOSIS — R44.0 AUDITORY HALLUCINATIONS: ICD-10-CM

## 2024-07-06 DIAGNOSIS — Z76.5 MALINGERER [CONSCIOUS SIMULATION]: ICD-10-CM

## 2024-07-06 DIAGNOSIS — Z20.822 CONTACT WITH AND (SUSPECTED) EXPOSURE TO COVID-19: ICD-10-CM

## 2024-07-06 DIAGNOSIS — F25.9 SCHIZOAFFECTIVE DISORDER, UNSPECIFIED: ICD-10-CM

## 2024-07-06 DIAGNOSIS — F43.29 ADJUSTMENT DISORDER WITH OTHER SYMPTOMS: ICD-10-CM

## 2024-07-06 DIAGNOSIS — F25.0 SCHIZOAFFECTIVE DISORDER, BIPOLAR TYPE: ICD-10-CM

## 2024-07-06 DIAGNOSIS — Z90.49 ACQUIRED ABSENCE OF OTHER SPECIFIED PARTS OF DIGESTIVE TRACT: Chronic | ICD-10-CM

## 2024-07-06 LAB
ALBUMIN SERPL ELPH-MCNC: 4 G/DL — SIGNIFICANT CHANGE UP (ref 3.5–5.2)
ALP SERPL-CCNC: 91 U/L — SIGNIFICANT CHANGE UP (ref 30–115)
ALT FLD-CCNC: 8 U/L — SIGNIFICANT CHANGE UP (ref 0–41)
ANION GAP SERPL CALC-SCNC: 12 MMOL/L — SIGNIFICANT CHANGE UP (ref 7–14)
APAP SERPL-MCNC: <5 UG/ML — LOW (ref 10–30)
AST SERPL-CCNC: 11 U/L — SIGNIFICANT CHANGE UP (ref 0–41)
BASOPHILS # BLD AUTO: 0.03 K/UL — SIGNIFICANT CHANGE UP (ref 0–0.2)
BASOPHILS NFR BLD AUTO: 0.4 % — SIGNIFICANT CHANGE UP (ref 0–1)
BILIRUB SERPL-MCNC: 1.1 MG/DL — SIGNIFICANT CHANGE UP (ref 0.2–1.2)
BUN SERPL-MCNC: 11 MG/DL — SIGNIFICANT CHANGE UP (ref 10–20)
CALCIUM SERPL-MCNC: 9.8 MG/DL — SIGNIFICANT CHANGE UP (ref 8.4–10.5)
CHLORIDE SERPL-SCNC: 100 MMOL/L — SIGNIFICANT CHANGE UP (ref 98–110)
CO2 SERPL-SCNC: 27 MMOL/L — SIGNIFICANT CHANGE UP (ref 17–32)
CREAT SERPL-MCNC: 1.1 MG/DL — SIGNIFICANT CHANGE UP (ref 0.7–1.5)
EGFR: 86 ML/MIN/1.73M2 — SIGNIFICANT CHANGE UP
EGFR: 86 ML/MIN/1.73M2 — SIGNIFICANT CHANGE UP
EOSINOPHIL # BLD AUTO: 0.37 K/UL — SIGNIFICANT CHANGE UP (ref 0–0.7)
EOSINOPHIL NFR BLD AUTO: 5.1 % — SIGNIFICANT CHANGE UP (ref 0–8)
ETHANOL SERPL-MCNC: <10 MG/DL — SIGNIFICANT CHANGE UP
GLUCOSE SERPL-MCNC: 76 MG/DL — SIGNIFICANT CHANGE UP (ref 70–99)
HCT VFR BLD CALC: 41.7 % — LOW (ref 42–52)
HGB BLD-MCNC: 13.6 G/DL — LOW (ref 14–18)
IMM GRANULOCYTES NFR BLD AUTO: 0.6 % — HIGH (ref 0.1–0.3)
LYMPHOCYTES # BLD AUTO: 0.58 K/UL — LOW (ref 1.2–3.4)
LYMPHOCYTES # BLD AUTO: 8.1 % — LOW (ref 20.5–51.1)
MCHC RBC-ENTMCNC: 25.7 PG — LOW (ref 27–31)
MCHC RBC-ENTMCNC: 32.6 G/DL — SIGNIFICANT CHANGE UP (ref 32–37)
MCV RBC AUTO: 78.7 FL — LOW (ref 80–94)
MONOCYTES # BLD AUTO: 0.78 K/UL — HIGH (ref 0.1–0.6)
MONOCYTES NFR BLD AUTO: 10.8 % — HIGH (ref 1.7–9.3)
NEUTROPHILS # BLD AUTO: 5.4 K/UL — SIGNIFICANT CHANGE UP (ref 1.4–6.5)
NEUTROPHILS NFR BLD AUTO: 75 % — SIGNIFICANT CHANGE UP (ref 42.2–75.2)
NRBC # BLD: 0 /100 WBCS — SIGNIFICANT CHANGE UP (ref 0–0)
NRBC BLD-RTO: 0 /100 WBCS — SIGNIFICANT CHANGE UP (ref 0–0)
PLATELET # BLD AUTO: 236 K/UL — SIGNIFICANT CHANGE UP (ref 130–400)
PMV BLD: 9 FL — SIGNIFICANT CHANGE UP (ref 7.4–10.4)
POTASSIUM SERPL-MCNC: 3.8 MMOL/L — SIGNIFICANT CHANGE UP (ref 3.5–5)
POTASSIUM SERPL-SCNC: 3.8 MMOL/L — SIGNIFICANT CHANGE UP (ref 3.5–5)
PROT SERPL-MCNC: 7.3 G/DL — SIGNIFICANT CHANGE UP (ref 6–8)
RBC # BLD: 5.3 M/UL — SIGNIFICANT CHANGE UP (ref 4.7–6.1)
RBC # FLD: 13.7 % — SIGNIFICANT CHANGE UP (ref 11.5–14.5)
SALICYLATES SERPL-MCNC: <0.3 MG/DL — LOW (ref 4–30)
SARS-COV-2 RNA SPEC QL NAA+PROBE: SIGNIFICANT CHANGE UP
SODIUM SERPL-SCNC: 139 MMOL/L — SIGNIFICANT CHANGE UP (ref 135–146)
WBC # BLD: 7.2 K/UL — SIGNIFICANT CHANGE UP (ref 4.8–10.8)
WBC # FLD AUTO: 7.2 K/UL — SIGNIFICANT CHANGE UP (ref 4.8–10.8)

## 2024-07-06 PROCEDURE — 99285 EMERGENCY DEPT VISIT HI MDM: CPT

## 2024-07-06 PROCEDURE — 36415 COLL VENOUS BLD VENIPUNCTURE: CPT

## 2024-07-06 PROCEDURE — 80053 COMPREHEN METABOLIC PANEL: CPT

## 2024-07-06 PROCEDURE — 99285 EMERGENCY DEPT VISIT HI MDM: CPT | Mod: 25

## 2024-07-06 PROCEDURE — 93005 ELECTROCARDIOGRAM TRACING: CPT

## 2024-07-06 PROCEDURE — 80307 DRUG TEST PRSMV CHEM ANLYZR: CPT

## 2024-07-06 PROCEDURE — 85025 COMPLETE CBC W/AUTO DIFF WBC: CPT

## 2024-07-06 PROCEDURE — 87635 SARS-COV-2 COVID-19 AMP PRB: CPT

## 2024-07-06 PROCEDURE — 93010 ELECTROCARDIOGRAM REPORT: CPT

## 2024-07-08 PROCEDURE — 90792 PSYCH DIAG EVAL W/MED SRVCS: CPT | Mod: 95

## 2024-10-10 NOTE — ED ADULT NURSE NOTE - NSICDXNOPASTMEDICALHX_GEN_ALL_CORE
Addended by: CHAPO PRAKASH on: 10/10/2024 02:07 PM     Modules accepted: Orders    
<-- Click to add NO pertinent Past Medical History

## 2024-10-17 ENCOUNTER — EMERGENCY (EMERGENCY)
Facility: HOSPITAL | Age: 44
LOS: 1 days | Discharge: LEFT BEFORE DISCHARGE | End: 2024-10-17
Attending: EMERGENCY MEDICINE | Admitting: EMERGENCY MEDICINE
Payer: SELF-PAY

## 2024-10-17 VITALS
SYSTOLIC BLOOD PRESSURE: 122 MMHG | DIASTOLIC BLOOD PRESSURE: 82 MMHG | TEMPERATURE: 98 F | HEART RATE: 86 BPM | WEIGHT: 179.9 LBS | RESPIRATION RATE: 16 BRPM

## 2024-10-17 DIAGNOSIS — F17.200 NICOTINE DEPENDENCE, UNSPECIFIED, UNCOMPLICATED: ICD-10-CM

## 2024-10-17 DIAGNOSIS — S01.312D LACERATION WITHOUT FOREIGN BODY OF LEFT EAR, SUBSEQUENT ENCOUNTER: ICD-10-CM

## 2024-10-17 DIAGNOSIS — Z90.49 ACQUIRED ABSENCE OF OTHER SPECIFIED PARTS OF DIGESTIVE TRACT: Chronic | ICD-10-CM

## 2024-10-17 DIAGNOSIS — F20.9 SCHIZOPHRENIA, UNSPECIFIED: ICD-10-CM

## 2024-10-17 DIAGNOSIS — Z53.29 PROCEDURE AND TREATMENT NOT CARRIED OUT BECAUSE OF PATIENT'S DECISION FOR OTHER REASONS: ICD-10-CM

## 2024-10-17 DIAGNOSIS — H92.02 OTALGIA, LEFT EAR: ICD-10-CM

## 2024-10-17 PROCEDURE — 99283 EMERGENCY DEPT VISIT LOW MDM: CPT

## 2024-10-17 PROCEDURE — 99053 MED SERV 10PM-8AM 24 HR FAC: CPT

## 2024-10-17 PROCEDURE — 99284 EMERGENCY DEPT VISIT MOD MDM: CPT

## 2024-10-17 RX ORDER — CEPHALEXIN 250 MG/1
500 CAPSULE ORAL ONCE
Refills: 0 | Status: COMPLETED | OUTPATIENT
Start: 2024-10-17 | End: 2024-10-17

## 2024-10-17 RX ORDER — CEPHALEXIN 250 MG/1
1 CAPSULE ORAL
Qty: 14 | Refills: 0
Start: 2024-10-17 | End: 2024-10-23

## 2024-10-17 RX ORDER — IBUPROFEN 200 MG
600 TABLET ORAL ONCE
Refills: 0 | Status: COMPLETED | OUTPATIENT
Start: 2024-10-17 | End: 2024-10-17

## 2025-04-28 ENCOUNTER — EMERGENCY (EMERGENCY)
Facility: HOSPITAL | Age: 45
LOS: 1 days | End: 2025-04-28
Attending: EMERGENCY MEDICINE | Admitting: EMERGENCY MEDICINE
Payer: MEDICAID

## 2025-04-28 VITALS
TEMPERATURE: 98 F | SYSTOLIC BLOOD PRESSURE: 152 MMHG | HEIGHT: 66 IN | DIASTOLIC BLOOD PRESSURE: 95 MMHG | OXYGEN SATURATION: 99 % | HEART RATE: 100 BPM | RESPIRATION RATE: 16 BRPM | WEIGHT: 173.94 LBS

## 2025-04-28 DIAGNOSIS — Z90.49 ACQUIRED ABSENCE OF OTHER SPECIFIED PARTS OF DIGESTIVE TRACT: Chronic | ICD-10-CM

## 2025-04-28 LAB
ALBUMIN SERPL ELPH-MCNC: 3.7 G/DL — SIGNIFICANT CHANGE UP (ref 3.4–5)
ALP SERPL-CCNC: 84 U/L — SIGNIFICANT CHANGE UP (ref 40–120)
ALT FLD-CCNC: 29 U/L — SIGNIFICANT CHANGE UP (ref 12–42)
ANION GAP SERPL CALC-SCNC: 7 MMOL/L — LOW (ref 9–16)
AST SERPL-CCNC: 31 U/L — SIGNIFICANT CHANGE UP (ref 15–37)
BASOPHILS # BLD AUTO: 0.03 K/UL — SIGNIFICANT CHANGE UP (ref 0–0.2)
BASOPHILS NFR BLD AUTO: 0.6 % — SIGNIFICANT CHANGE UP (ref 0–2)
BILIRUB SERPL-MCNC: 0.9 MG/DL — SIGNIFICANT CHANGE UP (ref 0.2–1.2)
BUN SERPL-MCNC: 12 MG/DL — SIGNIFICANT CHANGE UP (ref 7–23)
CALCIUM SERPL-MCNC: 9.4 MG/DL — SIGNIFICANT CHANGE UP (ref 8.5–10.5)
CHLORIDE SERPL-SCNC: 109 MMOL/L — HIGH (ref 96–108)
CO2 SERPL-SCNC: 28 MMOL/L — SIGNIFICANT CHANGE UP (ref 22–31)
CREAT SERPL-MCNC: 1.1 MG/DL — SIGNIFICANT CHANGE UP (ref 0.5–1.3)
EGFR: 86 ML/MIN/1.73M2 — SIGNIFICANT CHANGE UP
EGFR: 86 ML/MIN/1.73M2 — SIGNIFICANT CHANGE UP
EOSINOPHIL # BLD AUTO: 0.27 K/UL — SIGNIFICANT CHANGE UP (ref 0–0.5)
EOSINOPHIL NFR BLD AUTO: 5.5 % — SIGNIFICANT CHANGE UP (ref 0–6)
GLUCOSE SERPL-MCNC: 97 MG/DL — SIGNIFICANT CHANGE UP (ref 70–99)
HCT VFR BLD CALC: 39.6 % — SIGNIFICANT CHANGE UP (ref 39–50)
HGB BLD-MCNC: 12.7 G/DL — LOW (ref 13–17)
IMM GRANULOCYTES # BLD AUTO: 0.01 K/UL — SIGNIFICANT CHANGE UP (ref 0–0.07)
IMM GRANULOCYTES NFR BLD AUTO: 0.2 % — SIGNIFICANT CHANGE UP (ref 0–0.9)
LIDOCAIN IGE QN: 42 U/L — SIGNIFICANT CHANGE UP (ref 16–77)
LYMPHOCYTES # BLD AUTO: 0.44 K/UL — LOW (ref 1–3.3)
LYMPHOCYTES NFR BLD AUTO: 8.9 % — LOW (ref 13–44)
MCHC RBC-ENTMCNC: 25.8 PG — LOW (ref 27–34)
MCHC RBC-ENTMCNC: 32.1 G/DL — SIGNIFICANT CHANGE UP (ref 32–36)
MCV RBC AUTO: 80.5 FL — SIGNIFICANT CHANGE UP (ref 80–100)
MONOCYTES # BLD AUTO: 0.6 K/UL — SIGNIFICANT CHANGE UP (ref 0–0.9)
MONOCYTES NFR BLD AUTO: 12.1 % — SIGNIFICANT CHANGE UP (ref 2–14)
NEUTROPHILS # BLD AUTO: 3.6 K/UL — SIGNIFICANT CHANGE UP (ref 1.8–7.4)
NEUTROPHILS NFR BLD AUTO: 72.7 % — SIGNIFICANT CHANGE UP (ref 43–77)
NRBC # BLD AUTO: 0 K/UL — SIGNIFICANT CHANGE UP (ref 0–0)
NRBC # FLD: 0 K/UL — SIGNIFICANT CHANGE UP (ref 0–0)
NRBC BLD AUTO-RTO: 0 /100 WBCS — SIGNIFICANT CHANGE UP (ref 0–0)
PLATELET # BLD AUTO: 152 K/UL — SIGNIFICANT CHANGE UP (ref 150–400)
PMV BLD: 9.8 FL — SIGNIFICANT CHANGE UP (ref 7–13)
POTASSIUM SERPL-MCNC: 3.5 MMOL/L — SIGNIFICANT CHANGE UP (ref 3.5–5.3)
POTASSIUM SERPL-SCNC: 3.5 MMOL/L — SIGNIFICANT CHANGE UP (ref 3.5–5.3)
PROT SERPL-MCNC: 6.7 G/DL — SIGNIFICANT CHANGE UP (ref 6.4–8.2)
RBC # BLD: 4.92 M/UL — SIGNIFICANT CHANGE UP (ref 4.2–5.8)
RBC # FLD: 12.5 % — SIGNIFICANT CHANGE UP (ref 10.3–14.5)
SODIUM SERPL-SCNC: 144 MMOL/L — SIGNIFICANT CHANGE UP (ref 132–145)
WBC # BLD: 4.95 K/UL — SIGNIFICANT CHANGE UP (ref 3.8–10.5)
WBC # FLD AUTO: 4.95 K/UL — SIGNIFICANT CHANGE UP (ref 3.8–10.5)

## 2025-04-28 PROCEDURE — 99284 EMERGENCY DEPT VISIT MOD MDM: CPT

## 2025-04-28 RX ORDER — ONDANSETRON HCL/PF 4 MG/2 ML
8 VIAL (ML) INJECTION ONCE
Refills: 0 | Status: COMPLETED | OUTPATIENT
Start: 2025-04-28 | End: 2025-04-28

## 2025-04-28 RX ADMIN — Medication 1000 MILLILITER(S): at 06:53

## 2025-04-28 RX ADMIN — Medication 8 MILLIGRAM(S): at 06:53

## 2025-04-28 RX ADMIN — Medication 20 MILLIGRAM(S): at 06:53

## 2025-04-28 NOTE — ED PROVIDER NOTE - OBJECTIVE STATEMENT
44-year-old male, undomiciled with history of schizophrenia, past surgical history of appendectomy, takes risperidone daily with well-controlled psychiatric features at this time, presents with nausea/vomiting nonbilious nonbloody emesis with some frequent watery diarrhea and crampy abdominal pain associated with vomiting ongoing for approximately 4 days.  No documented fever.  No chest discomfort or shortness of breath.  No urinary symptoms.  Patient thinks this may be related to something that he ate that was bad.  No travel.

## 2025-04-28 NOTE — ED ADULT TRIAGE NOTE - CHIEF COMPLAINT QUOTE
Pt. walk in for abd. pain, n/v/d x 4 days. Also c/o painful itchy rash to groin x 3 days. PMH schizophrenia

## 2025-04-28 NOTE — ED PROVIDER NOTE - PHYSICAL EXAMINATION
VITAL SIGNS: I have reviewed nursing notes and confirm.  CONSTITUTIONAL: Well-developed; well-nourished; + disheveled/poor hygiene, in no acute distress.  SKIN: Skin exam is warm and dry, no acute rash.  HEAD: Normocephalic; atraumatic.  EYES: conjunctiva and sclera clear.  ENT: No nasal discharge; airway clear.  NECK: Supple   CARD: RRR  RESP: Unlabored.   ABD: soft; non-distended; non-tender, well healed surgical scar  EXT: Normal ROM.    NEURO: Alert, oriented. Grossly unremarkable.  PSYCH: Cooperative, appropriate.

## 2025-04-28 NOTE — ED PROVIDER NOTE - CLINICAL SUMMARY MEDICAL DECISION MAKING FREE TEXT BOX
Nausea/vomiting/diarrhea in well-appearing individual with a reassuring/nontender exam.  Patient is afebrile.  Treating symptomatically and checking abdominal labs.  Low threshold to image.

## 2025-04-30 DIAGNOSIS — R10.9 UNSPECIFIED ABDOMINAL PAIN: ICD-10-CM

## 2025-04-30 DIAGNOSIS — R11.2 NAUSEA WITH VOMITING, UNSPECIFIED: ICD-10-CM

## 2025-04-30 DIAGNOSIS — Z59.00 HOMELESSNESS UNSPECIFIED: ICD-10-CM

## 2025-04-30 DIAGNOSIS — R19.7 DIARRHEA, UNSPECIFIED: ICD-10-CM

## 2025-04-30 DIAGNOSIS — Z53.29 PROCEDURE AND TREATMENT NOT CARRIED OUT BECAUSE OF PATIENT'S DECISION FOR OTHER REASONS: ICD-10-CM

## 2025-04-30 DIAGNOSIS — F17.200 NICOTINE DEPENDENCE, UNSPECIFIED, UNCOMPLICATED: ICD-10-CM

## 2025-04-30 DIAGNOSIS — F20.9 SCHIZOPHRENIA, UNSPECIFIED: ICD-10-CM

## 2025-04-30 SDOH — ECONOMIC STABILITY - HOUSING INSECURITY: HOMELESSNESS UNSPECIFIED: Z59.00

## 2025-05-02 NOTE — BH INPATIENT PSYCHIATRY PROGRESS NOTE - NSBHASSESSSUMMFT_PSY_ALL_CORE
Breastfeeding fair ar and supplementing with formula.  Beginning to feel rankin and verbalizes concern for pain with feeding.  Discussed proper positioning, hand placement for breast support and deep latch for effective milk transfer.  Provided with hand pump and nipples measured- will use an 18mm on her right, and 21mm on her left.  Recently fed formula, infant not interested in latching.  Encouraged to pump now and attempt latching with early hunger cues, and to call for assistance with latching.  Reviewed outpatient information and to call with further concerns as needed.  Appreciative and verbalized understanding.    43-year-old man,, single with no significant PMH and with PPH of schizoaffective disorder, substance use disorder (alcohol, cannabis, cocaine), substance induced psychosis, PTSD, multiple prior hospitalizations (most recently to [facility] in 11/2023), reported history of suicide attempts, prior legal history of drug-related charges, who was BIB self after trying to hang himself with a belt from command auditory hallucinations. Pt presented with depressed mood, anhedonia, and aborted suicide attempt via hanging self with a belt, in the context of financial stressors and feelings of hopelessness. The patient has history of multiple prior hospitalizations and ED visits, is a poor utilizer of outpatient services, and has history of substance abuse. Pt will likely benefit from IPP at this time.    On evaluation, pt presents alert, linear and AOx3, with improved focus and ADLs. Of note, pt was with intermittent fevers, negative NMS w/u, seen by ID and cleared with negative w/u. Haldol discontinued d/t concern of drug-induced etiology, and was with improvement in labs and VS. Pt reported continued AH telling him to harm self, denied intent and plan to harm self. Pt agreed to trial zyprexa; CBC to be trended, continues to improve. He is help-seeking and future-oriented, able to safety plan, has not been a behavioral concern. Of note, pt continues with intermittent fevers of unclear cause, pending hospitalist consult.     #Schizoaffective d/o  -start zyprexa 5 mg daily (1/26), titrate zyprexa 5 mg bid (1/27) --> titrate zyprexa 5 mg qAM/ 10 mg qHS (1/29)  -d/c haldol 10 mg bid d/t lab abnormalities  -hold cogentin 0.5 mg bid for eps ppx  -hold remeron 15 mg qhs    #Leukopenia/neutropenia, thrombocytopenia  -improving  -trend CBC with diff with initiation of zyprexa, f/u CBC in AM  -ID consult  -medicine consult    #H/o polysubstance use  -pt denies recent use  -UDS unable to be collected    #Agitation  -for agitation not amenable to verbal redirection, may give zyprexa 5 mg q6h prn with escalation to IM if pt is refusing PO and is an acute danger to self or/and others

## 2025-05-13 NOTE — BH SOCIAL WORK INITIAL PSYCHOSOCIAL EVALUATION - NSBHLEGALRESTRAINPT_PSY_ALL_CORE
Caller was Mara, patient's mother.  Mara called to let Dr Lopez know that she is taking patient to the ER.  Patient has been very run down, and has been experiencing diarrhea every 15 minutes.  Mara is requesting to speak with a nurse.  Please call her back at [phone].   
Patient arrived to [facility] ED at 0750.    Returned call to patient's mother Pat (IMANI confirmed).  Confirmed that Dr. Lopez will be updated so he can watch chart for outcome of ER visit.  
No
